# Patient Record
Sex: FEMALE | Race: WHITE | Employment: OTHER | URBAN - METROPOLITAN AREA
[De-identification: names, ages, dates, MRNs, and addresses within clinical notes are randomized per-mention and may not be internally consistent; named-entity substitution may affect disease eponyms.]

---

## 2017-04-13 ENCOUNTER — OFFICE VISIT (OUTPATIENT)
Dept: ORTHOPEDIC SURGERY | Age: 67
End: 2017-04-13

## 2017-04-13 VITALS
DIASTOLIC BLOOD PRESSURE: 69 MMHG | HEIGHT: 62 IN | SYSTOLIC BLOOD PRESSURE: 111 MMHG | HEART RATE: 74 BPM | WEIGHT: 287.04 LBS | BODY MASS INDEX: 52.82 KG/M2

## 2017-04-13 DIAGNOSIS — M65.331 TRIGGER FINGER, RIGHT MIDDLE FINGER: ICD-10-CM

## 2017-04-13 DIAGNOSIS — M70.22 OLECRANON BURSITIS, LEFT ELBOW: ICD-10-CM

## 2017-04-13 DIAGNOSIS — M25.522 LEFT ELBOW PAIN: Primary | ICD-10-CM

## 2017-04-13 DIAGNOSIS — M79.641 RIGHT HAND PAIN: ICD-10-CM

## 2017-04-13 DIAGNOSIS — M19.041 OSTEOARTHRITIS OF FINGER OF RIGHT HAND: ICD-10-CM

## 2017-04-13 PROCEDURE — 99203 OFFICE O/P NEW LOW 30 MIN: CPT | Performed by: ORTHOPAEDIC SURGERY

## 2017-04-13 PROCEDURE — 73140 X-RAY EXAM OF FINGER(S): CPT | Performed by: ORTHOPAEDIC SURGERY

## 2017-04-13 PROCEDURE — 73080 X-RAY EXAM OF ELBOW: CPT | Performed by: ORTHOPAEDIC SURGERY

## 2017-04-13 PROCEDURE — E0191 PROTECTOR HEEL OR ELBOW: HCPCS | Performed by: ORTHOPAEDIC SURGERY

## 2017-04-13 PROCEDURE — 20550 NJX 1 TENDON SHEATH/LIGAMENT: CPT | Performed by: ORTHOPAEDIC SURGERY

## 2017-06-07 ENCOUNTER — HOSPITAL ENCOUNTER (OUTPATIENT)
Dept: MAMMOGRAPHY | Age: 67
Discharge: HOME | End: 2017-06-07
Attending: FAMILY MEDICINE | Admitting: FAMILY MEDICINE

## 2017-06-07 DIAGNOSIS — Z12.31 ENCOUNTER FOR SCREENING MAMMOGRAM FOR BREAST CANCER: ICD-10-CM

## 2017-07-17 ENCOUNTER — OFFICE VISIT (OUTPATIENT)
Dept: CARDIOLOGY CLINIC | Age: 67
End: 2017-07-17

## 2017-07-17 VITALS
DIASTOLIC BLOOD PRESSURE: 54 MMHG | HEIGHT: 61 IN | HEART RATE: 77 BPM | BODY MASS INDEX: 50.79 KG/M2 | SYSTOLIC BLOOD PRESSURE: 126 MMHG | WEIGHT: 269 LBS

## 2017-07-17 DIAGNOSIS — R06.02 SHORTNESS OF BREATH: Chronic | ICD-10-CM

## 2017-07-17 DIAGNOSIS — R06.4 LABORED BREATHING: ICD-10-CM

## 2017-07-17 DIAGNOSIS — I48.0 PAROXYSMAL ATRIAL FIBRILLATION (HCC): Primary | ICD-10-CM

## 2017-07-17 DIAGNOSIS — R06.02 SOB (SHORTNESS OF BREATH): ICD-10-CM

## 2017-07-17 PROCEDURE — 99214 OFFICE O/P EST MOD 30 MIN: CPT | Performed by: INTERNAL MEDICINE

## 2017-07-17 PROCEDURE — 93000 ELECTROCARDIOGRAM COMPLETE: CPT | Performed by: INTERNAL MEDICINE

## 2017-07-17 RX ORDER — ATORVASTATIN CALCIUM 40 MG/1
40 TABLET, FILM COATED ORAL NIGHTLY
COMMUNITY
Start: 2017-04-20

## 2017-07-26 ENCOUNTER — HOSPITAL ENCOUNTER (OUTPATIENT)
Dept: CARDIOLOGY | Facility: CLINIC | Age: 67
Discharge: HOME | End: 2017-07-26
Attending: INTERNAL MEDICINE | Admitting: INTERNAL MEDICINE

## 2017-07-26 LAB
LV EF: 55 %
LVEF MODALITY: NORMAL

## 2017-07-28 ENCOUNTER — TELEPHONE (OUTPATIENT)
Dept: CARDIOLOGY CLINIC | Age: 67
End: 2017-07-28

## 2017-09-21 ENCOUNTER — OFFICE VISIT (OUTPATIENT)
Dept: ORTHOPEDIC SURGERY | Age: 67
End: 2017-09-21

## 2017-09-21 ENCOUNTER — TELEPHONE (OUTPATIENT)
Dept: ORTHOPEDIC SURGERY | Age: 67
End: 2017-09-21

## 2017-09-21 VITALS
SYSTOLIC BLOOD PRESSURE: 107 MMHG | DIASTOLIC BLOOD PRESSURE: 63 MMHG | HEART RATE: 105 BPM | BODY MASS INDEX: 50.78 KG/M2 | WEIGHT: 268.96 LBS | HEIGHT: 61 IN

## 2017-09-21 DIAGNOSIS — M65.331 TRIGGER FINGER, RIGHT MIDDLE FINGER: Primary | ICD-10-CM

## 2017-09-21 PROCEDURE — 99213 OFFICE O/P EST LOW 20 MIN: CPT | Performed by: ORTHOPAEDIC SURGERY

## 2017-10-03 NOTE — H&P
HISTORY & PHYSICAL FOR LOCAL CASES    History of present illness:  Right long trigger finger    All allergies & meds reviewed  RELEVANT EXAMS:  Airway:  normal    Pulmonary: normal    Cardiovascular: normal, history of prior atrial fibrillation    Abdominal: normal    Specific to procedure: Right long trigger finger    I have reviewed with the patient and/or family the risks, benefits and alternatives to the procedure.   ASA Class:  2    The patient condition is acceptable for planned local anesthesia:

## 2017-10-04 ENCOUNTER — HOSPITAL ENCOUNTER (OUTPATIENT)
Dept: SURGERY | Age: 67
Discharge: HOME | End: 2017-10-04
Attending: ORTHOPAEDIC SURGERY | Admitting: ORTHOPAEDIC SURGERY

## 2017-10-04 VITALS
TEMPERATURE: 97.5 F | RESPIRATION RATE: 18 BRPM | WEIGHT: 268 LBS | BODY MASS INDEX: 49.32 KG/M2 | SYSTOLIC BLOOD PRESSURE: 103 MMHG | DIASTOLIC BLOOD PRESSURE: 54 MMHG | HEART RATE: 83 BPM | HEIGHT: 62 IN | OXYGEN SATURATION: 97 %

## 2017-10-04 RX ORDER — HYDROCODONE BITARTRATE AND ACETAMINOPHEN 5; 325 MG/1; MG/1
1 TABLET ORAL EVERY 6 HOURS PRN
Qty: 10 TABLET | Refills: 0 | Status: SHIPPED | OUTPATIENT
Start: 2017-10-04 | End: 2017-10-11

## 2017-10-04 ASSESSMENT — PAIN SCALES - GENERAL: PAINLEVEL_OUTOF10: 0

## 2017-10-04 ASSESSMENT — PAIN DESCRIPTION - DESCRIPTORS: DESCRIPTORS: ACHING;TINGLING

## 2017-10-04 ASSESSMENT — PAIN - FUNCTIONAL ASSESSMENT: PAIN_FUNCTIONAL_ASSESSMENT: 0-10

## 2017-10-04 NOTE — IP AVS SNAPSHOT
Where to Get Your Medications      You can get these medications from any pharmacy     Bring a paper prescription for each of these medications     HYDROcodone-acetaminophen 5-325 MG per tablet               Allergies as of 10/4/2017        Reactions    Morphine Nausea And Vomiting      Immunizations as of 10/4/2017     No immunizations on file. Last Vitals          Most Recent Value    Temp  98.1 °F (36.7 °C)    Pulse  98    Resp  18    BP  127/68         After Visit Summary    This summary was created for you. Thank you for entrusting your care to us. The following information includes details about your hospital/visit stay along with steps you should take to help with your recovery once you leave the hospital.  In this packet, you will find information about the topics listed below:    · Instructions about your medications including a list of your home medications  · A summary of your hospital visit  · Follow-up appointments once you have left the hospital  · Your care plan at home      You may receive a survey regarding the care you received during your stay. Your input is valuable to us. We encourage you to complete and return your survey in the envelope provided. We hope you will choose us in the future for your healthcare needs. Patient Information     Patient Name MARIAELENA Harris 1950      Care Provided at:     Name Address Phone       [place] [id number] [address] [facility] [phone]            Your Visit    Here you will find information about your visit, including the reason for your visit. Please take this sheet with you when you visit your doctor or other health care provider in the future. It will help determine the best possible medical care for you at that time. If you have any questions once you leave the hospital, please call the department phone number listed below.         Why you were here This section includes instructions you will need to follow once you leave the hospital.  Your care team will discuss these with you, so you and those caring for you know how to best care for your health needs at home. This section may also include educational information about certain health topics that may be of help to you. Discharge Instructions          Post op Instructions for Hand and Upper Extremity Surgery    * Keep dressing dry and clean. It is ok to Shower just keep Dressing dry. * Elevate operative arm. * Ice 20 minutes on 20 minutes off. * Follow-up appointment as scheduled by office staff. Check paperwork from office. If no appointment scheduled call the office. * If dressing is too tight, you may loosen Ace bandage and leave splint in place. * Sling, as needed  * If you have any questions, refer to the office number listed below. [phone] [address]    What is a Surgical Site Infection   (SSI)? A surgical site infection is an infection that occurs after surgery in the part of the body where the surgery took place. Most patients who have surgery do not develop an infection. However, infections develop in about 1 to 3 out of every 100 patients who have surgery. Some of the common symptoms of a surgical site infection are:    -Redness and pain around the area where you had surgery     -Drainage of cloudy fluid from your surgical wound     -Fever  Can SSIs be treated? Yes. Most surgical site infections can be treated with antibiotics. The antibiotic given to you depends on the bacteria (germs) causing the infection. Sometimes patients   with SSIs also need another surgery to treat the infection. What are some of the things that hospitals are doing to prevent SSIs? To prevent SSIs, doctors, nurses, and other healthcare providers:       Clean their hands and arms up to their elbows with an antiseptic agent just before the surgery. Our records indicate that you have an active [employer] account. You can view your After Visit Summary by going to [email or web address] and logging in with your [employer] username and password. If you don't have a [employer] username and password but a parent or guardian has access to your record, the parent or guardian should login with their own [employer] username and password and access your record to view the After Visit Summary. Additional Information  If you have questions, please contact the physician practice where you receive care. Remember, [employer] is NOT to be used for urgent needs. For medical emergencies, dial 911. For questions regarding your [employer] account call [phone]. If you have a clinical question, please call your doctor's office. View your information online  ? Review your current list of  medications, immunization, and allergies. ? Review your future test results online . ? Review your discharge instructions provided by your caregivers at discharge    Certain functionality such as prescription refills, scheduling appointments or sending messages to your provider are not activated if your provider does not use [employer] in his/her office    For questions regarding your [employer] account call [phone]. If you have a clinical question, please call your doctor's office. The information on all pages of the After Visit Summary has been reviewed with me, the patient and/or responsible adult, by my health care provider(s). I had the opportunity to ask questions regarding this information. I understand I should dispose of my armband safely at home to protect my health information. A complete copy of the After Visit Summary has been given to me, the patient and/or responsible adult.            Patient Signature/Responsible Adult:____________________    Clinician Signature:_____________________    Date:_____________________

## 2017-10-10 ENCOUNTER — TELEPHONE (OUTPATIENT)
Dept: CARDIOLOGY CLINIC | Age: 67
End: 2017-10-10

## 2017-10-10 NOTE — TELEPHONE ENCOUNTER
Pt states that she is having tingling in her arms and feet and face and lips. She feels strange and overall weak. She reports no CP or SOB. Please call to advise.

## 2017-10-10 NOTE — TELEPHONE ENCOUNTER
Pt informed and she said she will probably go to ER. She can move her tongue alright and she doesn't think it's a stroke. She does not have any drooping in her face.

## 2017-10-13 ENCOUNTER — OFFICE VISIT (OUTPATIENT)
Dept: ORTHOPEDIC SURGERY | Age: 67
End: 2017-10-13

## 2017-10-13 VITALS — HEIGHT: 62 IN | WEIGHT: 268.08 LBS | BODY MASS INDEX: 49.33 KG/M2

## 2017-10-13 DIAGNOSIS — M65.331 TRIGGER FINGER, RIGHT MIDDLE FINGER: Primary | ICD-10-CM

## 2017-10-13 PROCEDURE — 99024 POSTOP FOLLOW-UP VISIT: CPT | Performed by: ORTHOPAEDIC SURGERY

## 2018-06-08 ENCOUNTER — HOSPITAL ENCOUNTER (OUTPATIENT)
Dept: MAMMOGRAPHY | Age: 68
Discharge: HOME | End: 2018-06-08
Attending: FAMILY MEDICINE | Admitting: FAMILY MEDICINE

## 2018-06-08 DIAGNOSIS — Z12.31 SCREENING MAMMOGRAM, ENCOUNTER FOR: ICD-10-CM

## 2018-08-14 RX ORDER — FLECAINIDE ACETATE 50 MG/1
50 TABLET ORAL 2 TIMES DAILY
Qty: 60 TABLET | Refills: 1 | Status: SHIPPED | OUTPATIENT
Start: 2018-08-14 | End: 2018-10-22 | Stop reason: SDUPTHER

## 2018-10-02 ENCOUNTER — OFFICE VISIT (OUTPATIENT)
Dept: CARDIOLOGY CLINIC | Age: 68
End: 2018-10-02
Payer: MEDICARE

## 2018-10-02 VITALS
BODY MASS INDEX: 41.77 KG/M2 | SYSTOLIC BLOOD PRESSURE: 122 MMHG | HEART RATE: 94 BPM | HEIGHT: 62 IN | DIASTOLIC BLOOD PRESSURE: 58 MMHG | OXYGEN SATURATION: 98 % | WEIGHT: 227 LBS

## 2018-10-02 DIAGNOSIS — I10 ESSENTIAL HYPERTENSION: ICD-10-CM

## 2018-10-02 DIAGNOSIS — I48.0 PAROXYSMAL ATRIAL FIBRILLATION (HCC): Primary | ICD-10-CM

## 2018-10-02 PROCEDURE — 93000 ELECTROCARDIOGRAM COMPLETE: CPT | Performed by: NURSE PRACTITIONER

## 2018-10-02 PROCEDURE — 99213 OFFICE O/P EST LOW 20 MIN: CPT | Performed by: NURSE PRACTITIONER

## 2018-10-02 RX ORDER — DILTIAZEM HYDROCHLORIDE 120 MG/1
120 CAPSULE, COATED, EXTENDED RELEASE ORAL DAILY
Qty: 90 CAPSULE | Refills: 3 | Status: SHIPPED | OUTPATIENT
Start: 2018-10-02 | End: 2019-10-03

## 2018-10-02 RX ORDER — HYDROCHLOROTHIAZIDE 25 MG/1
25 TABLET ORAL DAILY
Qty: 90 TABLET | Refills: 3
Start: 2018-10-02

## 2018-10-02 NOTE — COMMUNICATION BODY
[address]   Electrophysiology Note              Date:  October 2, 2018  Patient name: Rk Ayala  YOB: 1950    Primary Care physician: Aman Caballero MD    HISTORY OF PRESENT ILLNESS: The patient is a 77 y.o.  female with a history of paroxysmal atrial fibrillation, HTN, DM, obesity, and FADI. She had been taking flecainide and has maintained sinus rhythm. She wore a 24 hour holter in 8/2013 that showed normal sinus with premature beats. In 6/2016, she stopped taking flecainide due to cost, but restarted due to palpitations. Echo in 7/2017 showed an EF of 55%. Today she is being seen for paroxysmal atrial fibrillation. EKG shows SR with a HR of 74. She complains of dizziness and fluctuating blood glucose since losing weight. Complains of some palpitations (occuring 1-2x/month, lasting a couple hours) but denies chest pain and shortness of breath. Past Medical History:   has a past medical history of Atrial fibrillation ([address] Diabetes mellitus ([facility] [address] Hyperlipidemia; and Hypertension. Past Surgical History:   has a past surgical history that includes Tubal ligation; eye surgery; Hysterectomy; joint replacement (Bilateral); Rotator cuff repair (Bilateral); other surgical history (11/12/2014); and Finger trigger release (Right, 10/04/2017). Home Medications:    Prior to Admission medications    Medication Sig Start Date End Date Taking?  Authorizing Provider   flecainide (TAMBOCOR) 50 MG tablet Take 1 tablet by mouth 2 times daily 8/14/18  Yes Ayden KIMO Hamilton CNP   atorvastatin (LIPITOR) 40 MG tablet  4/20/17  Yes Historical Provider, MD   Cholecalciferol (VITAMIN D3) 2000 UNITS CAPS Take by mouth daily   Yes Historical Provider, MD   warfarin (COUMADIN) 2 MG tablet Take 2 tablets by mouth daily 7/13/16  Yes AydenKIMO Butterfield CNP   metFORMIN (GLUCOPHAGE) 500 MG tablet Take 1,000 mg by mouth 2 times daily (with meals)    Yes Historical

## 2018-10-02 NOTE — LETTER
[address] [place] [address] Cardiology - [facility] [address] [address]  Phone: [phone]  Fax: [phone]    Foy Osler, APRN - CNP        October 2, 2018     [address]    Patient: Rachelle Haywood  MR Number: [id number]  YOB: 1950  Date of Visit: 10/2/2018    Dear Dr. Adam Smith:    I recently saw our mutual patient, listed above. Below are the relevant portions of my assessment and plan of care. [address]   Electrophysiology Note              Date:  October 2, 2018  Patient name: Rachelle Haywood  YOB: 1950    Primary Care physician: Adam Smith MD    HISTORY OF PRESENT ILLNESS: The patient is a 77 y.o.  female with a history of paroxysmal atrial fibrillation, HTN, DM, obesity, and FADI. She had been taking flecainide and has maintained sinus rhythm. She wore a 24 hour holter in 8/2013 that showed normal sinus with premature beats. In 6/2016, she stopped taking flecainide due to cost, but restarted due to palpitations. Echo in 7/2017 showed an EF of 55%. Today she is being seen for paroxysmal atrial fibrillation. EKG shows SR with a HR of 74. She complains of dizziness and fluctuating blood glucose since losing weight. Complains of some palpitations (occuring 1-2x/month, lasting a couple hours) but denies chest pain and shortness of breath. Past Medical History:   has a past medical history of Atrial fibrillation ([address] Diabetes mellitus ([address] Hyperlipidemia; and Hypertension. Past Surgical History:   has a past surgical history that includes Tubal ligation; eye surgery; Hysterectomy; joint replacement (Bilateral); Rotator cuff repair (Bilateral); other surgical history (11/12/2014); and Finger trigger release (Right, 10/04/2017). Home Medications:    Prior to Admission medications    Medication Sig Start Date End Date Taking?  Authorizing Provider · Resp auscultation: Normal breath sounds without dullness or wheezing  Cardiovascular:  · The apical impulse is not displaced  · Heart tones are crisp and normal. regular S1 and S2.  · Jugular venous pulsation Normal  · The carotid upstroke is normal in amplitude and contour without delay or bruit  · Peripheral pulses are symmetrical and full   Abdomen:  · No masses or tenderness  · Bowel sounds present  Extremities:  ·  No cyanosis or clubbing  ·  No lower extremity edema  ·  Skin: warm and dry  Neurological:  · Alert and oriented  · Moves all extremities well  · No abnormalities of mood, affect, memory, mentation, or behavior are noted    DATA:    ECG 10/2/2018:  SR HR 74    Echo 7/26/2017:  Technically difficult examination.   Normal left ventricle systolic function with an estimated ejection fraction of 55%.   No regional wall motion abnormalities are seen.   Normal left ventricular diastolic filling pressure.   MIld pulmonic regurgitation.   Systolic pulmonary artery pressure (SPAP) is normal and estimated at 26 mmHg   (RA pressure 3 mmHg). CARDIOLOGY LABS:   CBC: No results for input(s): WBC, HGB, HCT, PLT in the last 72 hours. BMP: No results for input(s): NA, K, CO2, BUN, CREATININE, LABGLOM, GLUCOSE in the last 72 hours. PT/INR: No results for input(s): PROTIME, INR in the last 72 hours. APTT:No results for input(s): APTT in the last 72 hours. FASTING LIPID PANEL:No results found for: HDL, LDLDIRECT, LDLCALC, TRIG  LIVER PROFILE:No results for input(s): AST, ALT, ALB in the last 72 hours. Assessment:   1. Paroxysmal atrial fibrillation         -on flecainide              -on Coumadin for [id number] score 4 (age, gender, HTN, DM)  2. HTN: controlled  3. FADI: on CPAP  4. DM  5. Obesity    Plan:   1. Continue Coumadin, flecainide, and losartan  2. PCP managing Coumadin  3. Decrease HCTZ to 25mg daily due to dizziness  4.  Start Cardizem 120mg daily for palpitations to use in combination with flecainide   5. Can switch to Rythmol 150mg po TID if more cost effective  6. Monitor BP at home and call if consistently out of goal range  7. Follow up in one year     Corey Vaz, [address]  [phone]       If you have questions, please do not hesitate to call me. I look forward to following Shan Swenson along with you.     Sincerely,        Corey Vaz, APRN - CNP

## 2018-10-19 ENCOUNTER — TELEPHONE (OUTPATIENT)
Dept: CARDIOLOGY CLINIC | Age: 68
End: 2018-10-19

## 2018-10-23 RX ORDER — FLECAINIDE ACETATE 50 MG/1
50 TABLET ORAL 2 TIMES DAILY
Qty: 180 TABLET | Refills: 1 | Status: SHIPPED | OUTPATIENT
Start: 2018-10-23 | End: 2019-04-16 | Stop reason: SDUPTHER

## 2019-04-16 RX ORDER — FLECAINIDE ACETATE 50 MG/1
50 TABLET ORAL 2 TIMES DAILY
Qty: 180 TABLET | Refills: 1 | Status: SHIPPED | OUTPATIENT
Start: 2019-04-16 | End: 2019-10-03 | Stop reason: SDUPTHER

## 2019-04-16 NOTE — TELEPHONE ENCOUNTER
NPBB 10/2/2018     Plan:   1. Continue Coumadin, flecainide, and losartan  2. PCP managing Coumadin  3. Decrease HCTZ to 25mg daily due to dizziness  4. Start Cardizem 120mg daily for palpitations to use in combination with flecainide   5. Can switch to Rythmol 150mg po TID if more cost effective  6. Monitor BP at home and call if consistently out of goal range  7.  Follow up in one year      EKG 10/2/2018  BMP in care everywhere 11/23/2018

## 2019-06-11 ENCOUNTER — HOSPITAL ENCOUNTER (OUTPATIENT)
Dept: MAMMOGRAPHY | Age: 69
Discharge: HOME | End: 2019-06-11
Payer: MEDICARE

## 2019-06-11 DIAGNOSIS — Z12.31 ENCOUNTER FOR SCREENING MAMMOGRAM FOR BREAST CANCER: ICD-10-CM

## 2019-06-11 PROCEDURE — 77063 BREAST TOMOSYNTHESIS BI: CPT

## 2019-10-03 ENCOUNTER — OFFICE VISIT (OUTPATIENT)
Dept: CARDIOLOGY CLINIC | Age: 69
End: 2019-10-03
Payer: MEDICARE

## 2019-10-03 VITALS
HEIGHT: 62 IN | DIASTOLIC BLOOD PRESSURE: 62 MMHG | BODY MASS INDEX: 42.33 KG/M2 | SYSTOLIC BLOOD PRESSURE: 118 MMHG | WEIGHT: 230 LBS | HEART RATE: 86 BPM

## 2019-10-03 DIAGNOSIS — I10 ESSENTIAL HYPERTENSION: ICD-10-CM

## 2019-10-03 DIAGNOSIS — I48.0 PAROXYSMAL ATRIAL FIBRILLATION (HCC): Primary | ICD-10-CM

## 2019-10-03 PROCEDURE — 93000 ELECTROCARDIOGRAM COMPLETE: CPT | Performed by: NURSE PRACTITIONER

## 2019-10-03 PROCEDURE — 99213 OFFICE O/P EST LOW 20 MIN: CPT | Performed by: NURSE PRACTITIONER

## 2019-10-03 RX ORDER — FLECAINIDE ACETATE 50 MG/1
50 TABLET ORAL 2 TIMES DAILY
Qty: 180 TABLET | Refills: 3 | Status: SHIPPED | OUTPATIENT
Start: 2019-10-03 | End: 2020-09-16

## 2019-11-22 ENCOUNTER — OFFICE VISIT (OUTPATIENT)
Dept: ORTHOPEDIC SURGERY | Age: 69
End: 2019-11-22
Payer: MEDICARE

## 2019-11-22 VITALS — HEIGHT: 62 IN | BODY MASS INDEX: 42.31 KG/M2 | WEIGHT: 229.94 LBS

## 2019-11-22 DIAGNOSIS — M25.562 ACUTE PAIN OF BOTH KNEES: Primary | ICD-10-CM

## 2019-11-22 DIAGNOSIS — M25.561 ACUTE PAIN OF BOTH KNEES: Primary | ICD-10-CM

## 2019-11-22 DIAGNOSIS — Z96.653 HISTORY OF BILATERAL KNEE REPLACEMENT: ICD-10-CM

## 2019-11-22 PROCEDURE — 99214 OFFICE O/P EST MOD 30 MIN: CPT | Performed by: ORTHOPAEDIC SURGERY

## 2020-02-10 ENCOUNTER — OFFICE VISIT (OUTPATIENT)
Dept: ORTHOPEDIC SURGERY | Age: 70
End: 2020-02-10
Payer: MEDICARE

## 2020-02-10 VITALS — HEIGHT: 62 IN | WEIGHT: 234 LBS | BODY MASS INDEX: 43.06 KG/M2

## 2020-02-10 PROCEDURE — 99213 OFFICE O/P EST LOW 20 MIN: CPT | Performed by: PHYSICIAN ASSISTANT

## 2020-02-10 PROCEDURE — L3908 WHO COCK-UP NONMOLDE PRE OTS: HCPCS | Performed by: PHYSICIAN ASSISTANT

## 2020-02-10 NOTE — PROGRESS NOTES
Subjective:      Rukhsana Moss is an 68 y.o. female who presents for evaluation of right wrist pain. Onset was this morning - woke up with pain. Thinks she may have slept on it wrong. . The pain is up to 9/10, worsens with movement, and is relieved by rest. There is no associated numbness, tingling in the hand. She does have weakness in the hand. Evaluation to date: none. Treatment to date: avoidance of offending activity. Patient's medications, allergies, past medical, surgical, social and family histories were reviewed and updated as appropriate. Review of Systems  Pertinent items are noted in HPI. Past Surgical History:   Procedure Laterality Date    EYE SURGERY      FINGER TRIGGER RELEASE Right 10/04/2017    long    HYSTERECTOMY      JOINT REPLACEMENT Bilateral     bilateral knee replacements    OTHER SURGICAL HISTORY  11/12/2014    phacoemulsification of cataract with intraocular lens implant right eye    ROTATOR CUFF REPAIR Bilateral     bilateral rotator cuff repair    TUBAL LIGATION        Past Medical History:   Diagnosis Date    Atrial fibrillation ([address]     [facility] [facility]     Hyperlipidemia     Hypertension       Objective: The patient is alert and oriented X3. Appears stated age. Answers all questions appropriately. Ht 5' 2\" (1.575 m)   Wt 234 lb (106.1 kg)   BMI 42.80 kg/m²   Right wrist:  Edema and tenderness over dorsal/medial wrist. Limited flexion and extension of wrist due to pain. Limited supination but this appears to be symmetric to contralateral side. Negative Finkelstein. Skin is intact. Patient is neurovascularly intact. Brisk capillary refill. Left wrist:  normal exam, no swelling, tenderness, instability; ligaments intact, full ROM both hands, wrists, and finger joints Skin is intact. Patient is neurovascularly intact. Brisk capillary refill.        Imaging:  X-ray 3 views of the right wrist taken and reviewed today: shows arthritic changes,

## 2020-06-22 ENCOUNTER — HOSPITAL ENCOUNTER (OUTPATIENT)
Dept: WOMENS IMAGING | Age: 70
Discharge: HOME | End: 2020-06-22
Payer: MEDICARE

## 2020-06-22 ENCOUNTER — OFFICE VISIT (OUTPATIENT)
Dept: ORTHOPEDIC SURGERY | Age: 70
End: 2020-06-22
Payer: MEDICARE

## 2020-06-22 VITALS — HEIGHT: 62 IN | BODY MASS INDEX: 41.77 KG/M2 | WEIGHT: 227 LBS

## 2020-06-22 PROCEDURE — 77067 SCR MAMMO BI INCL CAD: CPT

## 2020-06-22 PROCEDURE — 99213 OFFICE O/P EST LOW 20 MIN: CPT | Performed by: ORTHOPAEDIC SURGERY

## 2020-06-22 RX ORDER — LIDOCAINE HYDROCHLORIDE 10 MG/ML
20 INJECTION, SOLUTION INFILTRATION; PERINEURAL ONCE
Status: COMPLETED | OUTPATIENT
Start: 2020-06-22 | End: 2020-06-22

## 2020-06-22 RX ORDER — METHYLPREDNISOLONE ACETATE 40 MG/ML
80 INJECTION, SUSPENSION INTRA-ARTICULAR; INTRALESIONAL; INTRAMUSCULAR; SOFT TISSUE ONCE
Status: COMPLETED | OUTPATIENT
Start: 2020-06-22 | End: 2020-06-22

## 2020-06-22 RX ORDER — MELOXICAM 15 MG/1
15 TABLET ORAL DAILY
Qty: 30 TABLET | Refills: 0 | Status: SHIPPED | OUTPATIENT
Start: 2020-06-22 | End: 2020-09-21

## 2020-06-22 RX ORDER — BUPIVACAINE HYDROCHLORIDE 2.5 MG/ML
30 INJECTION, SOLUTION INFILTRATION; PERINEURAL ONCE
Status: COMPLETED | OUTPATIENT
Start: 2020-06-22 | End: 2020-06-22

## 2020-06-22 RX ADMIN — METHYLPREDNISOLONE ACETATE 80 MG: 40 INJECTION, SUSPENSION INTRA-ARTICULAR; INTRALESIONAL; INTRAMUSCULAR; SOFT TISSUE at 14:28

## 2020-06-22 RX ADMIN — BUPIVACAINE HYDROCHLORIDE 75 MG: 2.5 INJECTION, SOLUTION INFILTRATION; PERINEURAL at 14:28

## 2020-06-22 RX ADMIN — LIDOCAINE HYDROCHLORIDE 20 ML: 10 INJECTION, SOLUTION INFILTRATION; PERINEURAL at 14:28

## 2020-07-06 ENCOUNTER — TELEPHONE (OUTPATIENT)
Dept: CARDIOLOGY CLINIC | Age: 70
End: 2020-07-06

## 2020-07-06 NOTE — TELEPHONE ENCOUNTER
Kaiden Blanc is pt okay to have cardiac clearance for hip replacement surgery on 9/24/20. Pt needs to know how long to hold Warfarin for. Pt has f/u with NPBB 10/7/20. Next EP appt 10/2020. EP is booked and front was told not to add on to EP currently. JMB please advise. Pt can be reached at [phone].         TY

## 2020-07-06 NOTE — TELEPHONE ENCOUNTER
Pt is having hip replacement on 9/24/2020 with Tyler Mclaughlin. Pt was a RPS pt. Appt on 10/07/2020 with NPBB . Pt needing cardiac clearance, and to find out if she can hold Warfarin. Next EP appt 10/2020. Told not to add-on to EP. Please advise.

## 2020-07-06 NOTE — TELEPHONE ENCOUNTER
She has not been seen by JORY KERN for 3 years. We cant provide cardiac risk assessment in this setting. She should have an echo and OV.

## 2020-07-08 NOTE — TELEPHONE ENCOUNTER
Alyssa Vilchis, you last saw pt 10/3/2019. Can an appointment be made with you as well as an echo for clearance for hip surgery in September as there are no EP MD appointments available until October? Or does she need to see an MD for clearance?

## 2020-07-08 NOTE — TELEPHONE ENCOUNTER
Pt called, informed that MD couldn't give cardiac clearance, surgery is scheduled for sept 24th, but no MD has availability until the end of October. Can RN look at schedule to see if we can fit her in? Please advise pt.  Will also need orders for echo

## 2020-07-21 ENCOUNTER — HOSPITAL ENCOUNTER (OUTPATIENT)
Dept: CARDIOLOGY | Age: 70
Discharge: HOME | End: 2020-07-21
Payer: MEDICARE

## 2020-07-21 ENCOUNTER — TELEPHONE (OUTPATIENT)
Dept: ORTHOPEDIC SURGERY | Age: 70
End: 2020-07-21

## 2020-07-21 LAB
LV EF: 55 %
LVEF MODALITY: NORMAL

## 2020-07-21 PROCEDURE — 93306 TTE W/DOPPLER COMPLETE: CPT

## 2020-07-27 ENCOUNTER — OFFICE VISIT (OUTPATIENT)
Dept: CARDIOLOGY CLINIC | Age: 70
End: 2020-07-27
Payer: MEDICARE

## 2020-07-27 VITALS
WEIGHT: 223 LBS | HEART RATE: 66 BPM | BODY MASS INDEX: 40.78 KG/M2 | SYSTOLIC BLOOD PRESSURE: 118 MMHG | DIASTOLIC BLOOD PRESSURE: 60 MMHG | OXYGEN SATURATION: 96 %

## 2020-07-27 PROBLEM — Z01.810 PRE-OPERATIVE CARDIOVASCULAR EXAMINATION: Status: ACTIVE | Noted: 2020-07-27

## 2020-07-27 PROCEDURE — 99204 OFFICE O/P NEW MOD 45 MIN: CPT | Performed by: INTERNAL MEDICINE

## 2020-07-27 PROCEDURE — 93000 ELECTROCARDIOGRAM COMPLETE: CPT | Performed by: INTERNAL MEDICINE

## 2020-07-27 RX ORDER — MELOXICAM 15 MG/1
15 TABLET ORAL DAILY
Qty: 30 TABLET | Refills: 3 | OUTPATIENT
Start: 2020-07-27

## 2020-07-27 NOTE — PROGRESS NOTES
[facility]   CARDIAC EVALUATION NOTE  [phone]      PCP:  Clary Ortega DO    Reason for Consultation/Chief Complaint: cardiac clearance for left TKR, interventional cardiology consultation    Subjective   History of Present Illness:  Farheen Sargent is a 65 y.o. patient with a history of PAF, HTN, HLD and DM who presents for pre-operative cardiac risk assessment for left TKR. She sees Tracey Ortiz CNP for PAF. Her 24 hour holter in 8/2013 showed SR with premature beats. In 6/2016, she stopped taking flecainide due to cost, but restarted due to palpitations. Her Echo in 7/2017 showed an EF of 55%. At her visit in 10/2018 HCTZ was decreased due to dizziness. Her echo from 07/21/20 showed her EF was 55%. Mild AS, AR, MR and [place]. She reports she is able to walk around at the grocery store with limitations. She lost over 100 lbs over the last 6 years. She is able to swim for exercise. She reports she has occasional \"blips\" or palpitations that are fleeting. She is on warfarin for PAF. She denies CP, SOB, dizziness or syncope. Past Medical History:   has a past medical history of Atrial fibrillation ([address] Diabetes mellitus ([address] Hyperlipidemia, and Hypertension. Surgical History:   has a past surgical history that includes Tubal ligation; eye surgery; Hysterectomy; joint replacement (Bilateral); Rotator cuff repair (Bilateral); other surgical history (11/12/2014); and Finger trigger release (Right, 10/04/2017). Social History:   reports that she has never smoked. She has never used smokeless tobacco. She reports that she does not drink alcohol or use drugs. Family History:  family history includes Cancer in her father and mother. Home Medications:  Were reviewed and are listed in nursing record and/or below  Prior to Admission medications    Medication Sig Start Date End Date Taking?  Authorizing Provider   Probiotic Product (PROBIOTIC PO) Take 1 tablet by mouth daily   Yes Historical Provider, MD   flecainide (TAMBOCOR) 50 MG tablet Take 1 tablet by mouth 2 times daily 10/3/19  Yes KIMO Mar CNP   hydrochlorothiazide (HYDRODIURIL) 25 MG tablet Take 1 tablet by mouth daily 10/2/18  Yes KIMO Mar CNP   atorvastatin (LIPITOR) 40 MG tablet  4/20/17  Yes Historical Provider, MD   Cholecalciferol (VITAMIN D3) 2000 UNITS CAPS Take by mouth daily   Yes Historical Provider, MD   warfarin (COUMADIN) 2 MG tablet Take 2 tablets by mouth daily 7/13/16  Yes KIMO Mar CNP   metFORMIN (GLUCOPHAGE) 500 MG tablet Take 1,000 mg by mouth 2 times daily (with meals)    Yes Historical Provider, MD   glipiZIDE (GLUCOTROL) 10 MG tablet Take 10 mg by mouth 2 times daily (before meals). Yes Historical Provider, MD   losartan (COZAAR) 100 MG tablet Take 100 mg by mouth daily. Yes Historical Provider, MD   meloxicam (MOBIC) 15 MG tablet Take 1 tablet by mouth daily  Patient not taking: Reported on 7/27/2020 6/22/20   Augusto Yarbrough MD          Allergies:  Furosemide; Hydrocodone-acetaminophen; Sitagliptin; and Morphine     Review of Systems:   A 14 point review of symptoms completed. Pertinent positives identified in the HPI, all other review of symptoms negative as below.       Objective   PHYSICAL EXAM:    Vitals:    07/27/20 1217   BP: 118/60   Pulse: 66   SpO2: 96%    Weight: 223 lb (101.2 kg)         General Appearance:  Alert, cooperative, no distress, appears stated age   Head:  Normocephalic, without obvious abnormality, atraumatic   Eyes:  PERRL, conjunctiva/corneas clear   Nose: Nares normal, no drainage or sinus tenderness   Throat: Lips, mucosa, and tongue normal   Neck: Supple, symmetrical, trachea midline, no adenopathy, thyroid: not enlarged, symmetric, no tenderness/mass/nodules, no carotid bruit or JVD   Lungs:   Clear to auscultation bilaterally, respirations unlabored   Chest Wall:  No deformity or tenderness   Heart:  Regular rate and rhythm, S1, S2 normal, no murmur, rub or gallop   Abdomen:   Soft, non-tender, bowel sounds active all four quadrants,  no masses, no organomegaly   Extremities: Extremities normal, atraumatic, no cyanosis or edema   Pulses: 2+ and symmetric   Skin: Skin color, texture, turgor normal, no rashes or lesions   Pysch: Normal mood and affect   Neurologic: Normal gross motor and sensory exam.         Labs   CBC:   Lab Results   Component Value Date    WBC 7.9 11/12/2014    RBC 5.56 11/12/2014    HGB 15.4 11/12/2014    HCT 45.7 11/12/2014    MCV 82.2 11/12/2014    RDW 13.4 11/12/2014     11/12/2014     CMP:  Lab Results   Component Value Date     05/20/2016    K 3.8 05/20/2016    CL 97 05/20/2016    CO2 26 05/20/2016    BUN 18 05/20/2016    CREATININE 0.7 05/20/2016    GFRAA >60 05/20/2016    LABGLOM >60 05/20/2016    GLUCOSE 149 05/20/2016    CALCIUM 9.6 05/20/2016     PT/INR:  No results found for: PTINR  HgBA1c:No results found for: LABA1C  No results found for: CKTOTAL, CKMB, CKMBINDEX, TROPONINI      Cardiac Data     Last EKG:   Possible ectopic atrial rhythm w/ ivcd, similar to prev     Echo: 07/21/20  Summary   Normal left ventricle systolic function with an estimated ejection fraction   of 55%. No regional wall motion abnormalities are seen. Normal left ventricular diastolic filling pressure. The aortic valve appears mildly thickened/calcified with decreased leaflet   mobility. Mild aortic stenosis. Mild aortic regurgitation. Mild mitral and pulmonic regurgitation. Systolic pulmonary artery pressure (SPAP) is normal and estimated at 28 mmHg   (right atrial pressure 3 mmHg). Stress Test:    Cath:    Studies:       I have reviewed labs and imaging/xray/diagnostic testing in this note. Assessment      1. Pre-operative cardiovascular examination    2. Essential hypertension    3.  Paroxysmal atrial fibrillation (HCC)                 Plan   1. EP follow up for Paroxysmal atrial fibrillation, consider ablation  2. [place] Leriche for surgery at intermediate cardiac risk  3. Follow up as needed       Scribe's attestation: This note was scribed in the presence of Dr. Kriss Staley by Lul Dawkins RN      Thank you for allowing us to participate in the care of Merlin Legacy. Please call me with any questions [phone]. Kriss Staley MD, [facility] [place]   Interventional Cardiologist  [facility]  [phone] [facility]  [phone] [address] [place] [address]  7/27/2020 1:02 PM    I will address the patient's cardiac risk factors and adjusted pharmacologic treatment as needed. In addition, I have reinforced the need for patient directed risk factor modification. Tobacco use was discussed with the patient and educated on the negative effects and was asked not to use. All questions and concerns were addressed to the patient/family. Alternatives to my treatment were discussed. I, Dr Kriss Staley, personally performed the services described in this documentation, as scribed by the above signed scribe in my presence. It is both accurate and complete to my knowledge. I agree with the details independently gathered by the clinical support staff and the scribed note accurately describes my personal service to the patient.

## 2020-07-27 NOTE — PATIENT INSTRUCTIONS
1. EP follow up for Paroxysmal atrial fibrillation   2. [facility] for surgery at intermediate cardiac risk  3.  Follow up as needed

## 2020-07-27 NOTE — LETTER
[address] Cardiology - [address]  Phone: [phone]  Fax: [phone]    Elroy Vazquez MD        July 27, 2020    [place] [id number]      To Whom It May Concern,     Leatha Price 1950 is at intermediate cardiac risk for non cardiac surgery. If you have any questions or concerns, please don't hesitate to call.     Sincerely,         Elroy Vazquez MD

## 2020-08-26 PROBLEM — Z01.810 PRE-OPERATIVE CARDIOVASCULAR EXAMINATION: Status: RESOLVED | Noted: 2020-07-27 | Resolved: 2020-08-26

## 2020-09-04 ENCOUNTER — TELEPHONE (OUTPATIENT)
Dept: ORTHOPEDIC SURGERY | Age: 70
End: 2020-09-04

## 2020-09-04 NOTE — TELEPHONE ENCOUNTER
AuthConstkaila Lilian [id number]    Date: 09/24/20  Type of SX:  INPATIENT  Location: [facility]  CPT: 51825   DX Code: M16.12  SX area: L HIP  Insurance: [facility]

## 2020-09-10 ENCOUNTER — HOSPITAL ENCOUNTER (OUTPATIENT)
Dept: PREADMISSION TESTING | Age: 70
Discharge: HOME | End: 2020-09-14
Payer: MEDICARE

## 2020-09-10 LAB
ABO/RH: NORMAL
ALBUMIN SERPL-MCNC: 4 G/DL (ref 3.4–5)
ANION GAP SERPL CALCULATED.3IONS-SCNC: 14 MMOL/L (ref 3–16)
ANTIBODY SCREEN: NORMAL
APTT: 41.3 SEC (ref 24.2–36.2)
BACTERIA: ABNORMAL /HPF
BASOPHILS ABSOLUTE: 0.1 K/UL (ref 0–0.2)
BASOPHILS RELATIVE PERCENT: 0.8 %
BILIRUBIN URINE: NEGATIVE
BLOOD, URINE: ABNORMAL
BUN BLDV-MCNC: 24 MG/DL (ref 7–20)
CALCIUM SERPL-MCNC: 9.7 MG/DL (ref 8.3–10.6)
CHLORIDE BLD-SCNC: 100 MMOL/L (ref 99–110)
CLARITY: CLEAR
CO2: 27 MMOL/L (ref 21–32)
COLOR: YELLOW
CREAT SERPL-MCNC: 0.7 MG/DL (ref 0.6–1.2)
EKG ATRIAL RATE: 69 BPM
EKG DIAGNOSIS: NORMAL
EKG P-R INTERVAL: 142 MS
EKG Q-T INTERVAL: 388 MS
EKG QRS DURATION: 110 MS
EKG QTC CALCULATION (BAZETT): 415 MS
EKG R AXIS: 38 DEGREES
EKG T AXIS: 37 DEGREES
EKG VENTRICULAR RATE: 69 BPM
EOSINOPHILS ABSOLUTE: 0.2 K/UL (ref 0–0.6)
EOSINOPHILS RELATIVE PERCENT: 2.1 %
EPITHELIAL CELLS, UA: ABNORMAL /HPF (ref 0–5)
ESTIMATED AVERAGE GLUCOSE: 131.2 MG/DL
GFR AFRICAN AMERICAN: >60
GFR NON-AFRICAN AMERICAN: >60
GLUCOSE BLD-MCNC: 141 MG/DL (ref 70–99)
GLUCOSE URINE: NEGATIVE MG/DL
HBA1C MFR BLD: 6.2 %
HCT VFR BLD CALC: 41.9 % (ref 36–48)
HEMOGLOBIN: 14.1 G/DL (ref 12–16)
INR BLD: 2.71 (ref 0.86–1.14)
KETONES, URINE: NEGATIVE MG/DL
LEUKOCYTE ESTERASE, URINE: ABNORMAL
LYMPHOCYTES ABSOLUTE: 1.9 K/UL (ref 1–5.1)
LYMPHOCYTES RELATIVE PERCENT: 25.7 %
MCH RBC QN AUTO: 28.1 PG (ref 26–34)
MCHC RBC AUTO-ENTMCNC: 33.6 G/DL (ref 31–36)
MCV RBC AUTO: 83.6 FL (ref 80–100)
MICROSCOPIC EXAMINATION: YES
MONOCYTES ABSOLUTE: 0.6 K/UL (ref 0–1.3)
MONOCYTES RELATIVE PERCENT: 7.8 %
NEUTROPHILS ABSOLUTE: 4.7 K/UL (ref 1.7–7.7)
NEUTROPHILS RELATIVE PERCENT: 63.6 %
NITRITE, URINE: NEGATIVE
PDW BLD-RTO: 13.5 % (ref 12.4–15.4)
PH UA: 5.5 (ref 5–8)
PLATELET # BLD: 240 K/UL (ref 135–450)
PMV BLD AUTO: 8.3 FL (ref 5–10.5)
POTASSIUM SERPL-SCNC: 4.2 MMOL/L (ref 3.5–5.1)
PROTEIN UA: NEGATIVE MG/DL
PROTHROMBIN TIME: 31.8 SEC (ref 10–13.2)
RBC # BLD: 5.02 M/UL (ref 4–5.2)
RBC UA: ABNORMAL /HPF (ref 0–4)
SODIUM BLD-SCNC: 141 MMOL/L (ref 136–145)
SPECIFIC GRAVITY UA: 1.02 (ref 1–1.03)
TRANSFERRIN: 232 MG/DL (ref 200–360)
URINE TYPE: ABNORMAL
UROBILINOGEN, URINE: 0.2 E.U./DL
WBC # BLD: 7.4 K/UL (ref 4–11)
WBC UA: ABNORMAL /HPF (ref 0–5)

## 2020-09-10 PROCEDURE — 82040 ASSAY OF SERUM ALBUMIN: CPT

## 2020-09-10 PROCEDURE — 84466 ASSAY OF TRANSFERRIN: CPT

## 2020-09-10 PROCEDURE — 85025 COMPLETE CBC W/AUTO DIFF WBC: CPT

## 2020-09-10 PROCEDURE — 83036 HEMOGLOBIN GLYCOSYLATED A1C: CPT

## 2020-09-10 PROCEDURE — 80048 BASIC METABOLIC PNL TOTAL CA: CPT

## 2020-09-10 PROCEDURE — 86900 BLOOD TYPING SEROLOGIC ABO: CPT

## 2020-09-10 PROCEDURE — 93010 ELECTROCARDIOGRAM REPORT: CPT | Performed by: INTERNAL MEDICINE

## 2020-09-10 PROCEDURE — 81001 URINALYSIS AUTO W/SCOPE: CPT

## 2020-09-10 PROCEDURE — 87077 CULTURE AEROBIC IDENTIFY: CPT

## 2020-09-10 PROCEDURE — 85730 THROMBOPLASTIN TIME PARTIAL: CPT

## 2020-09-10 PROCEDURE — 85610 PROTHROMBIN TIME: CPT

## 2020-09-10 PROCEDURE — 86901 BLOOD TYPING SEROLOGIC RH(D): CPT

## 2020-09-10 PROCEDURE — 87086 URINE CULTURE/COLONY COUNT: CPT

## 2020-09-10 PROCEDURE — 87186 SC STD MICRODIL/AGAR DIL: CPT

## 2020-09-10 PROCEDURE — 36415 COLL VENOUS BLD VENIPUNCTURE: CPT

## 2020-09-10 PROCEDURE — 86850 RBC ANTIBODY SCREEN: CPT

## 2020-09-10 PROCEDURE — 93005 ELECTROCARDIOGRAM TRACING: CPT | Performed by: ORTHOPAEDIC SURGERY

## 2020-09-12 LAB
ORGANISM: ABNORMAL
URINE CULTURE, ROUTINE: ABNORMAL

## 2020-09-16 ENCOUNTER — TELEPHONE (OUTPATIENT)
Dept: ORTHOPEDIC SURGERY | Age: 70
End: 2020-09-16

## 2020-09-16 RX ORDER — FLECAINIDE ACETATE 50 MG/1
TABLET ORAL
Qty: 180 TABLET | Refills: 0 | Status: SHIPPED | OUTPATIENT
Start: 2020-09-16 | End: 2020-11-10 | Stop reason: ALTCHOICE

## 2020-09-16 NOTE — TELEPHONE ENCOUNTER
COVID: 9/18/2020- negative    Pre-treat pt for post op N/V     Orthopedic Nurse Navigator Summary  -  Patient Name: Heather Godoy  Anticipated Date of Surgery:9/24/2020  Using OrthoVitals? Yes, Are they Registered: Yes  Attended Pre-Op Education Class: No  If No, why not? PCP:  Phone #:  Date of PCP Visit for H&P: 09/15/2020  Any Noted Concerns from PCP prior to surgery: No  If Yes, what concerns?:  Is the Patient in a Pain Management Program?: No  Review of Past Medical History Reveals History of:  -  Critical Lab Values:  - Hemoglobin (g/dL): Date Value 14.1  - Hematocrit (%): Date Value  - HgbA1C : Date Value 6.2  - Albumin : Date Value 4.0  - BUN (mg/dL) : Date Value 24.0  - CREATININE (mg/dL) : Date Value 0.7  - BMI (kg/m2) : Date Value  -  Coronary Artery Disease/HTN/CHF History: Yes  Cardiologist: HTN, A-fib  Cardiac Clearance Necessary: Yes  Date of Cardiac Clearance Appt: 07/27/2020  On Plavix? No  If YES, when will they stop taking? Final Cardiac Recommendations: Cleared for surgery- to hold coumadin 5 days prior. On any anticoagulation: Yes  -  Diabetes History: Yes  Most Recent HgbA1C: 6.2  PCP or Endocrine Recommendations:  Nutritionist/Dietician Consult Scheduled:  Final Plan For Diabetic Control:  Pulmonary: COPD/Emphysema/ Use of home oxygen: FADI  Alcohol use: Non-Drinker  -  DVT Risk Stratification: Low Risk  Vascular Consult Ordered:  Date of Vascular Appt:  Hematology/Oncology Consult Ordered:  Date of Hematology/Oncology Appt:  Final Recommendation For DVT Prophylaxis:  Smoking history: Non-Smoker  Use of Estrogen:  -  BMI Greater than 40 at time of scheduling?: No  Has Surgeon been notified of BMI concern? No  Weight Loss Clinic Consult Ordered No  Date of Wt Loss Clinic Appt:  BMI at time of surgery (if went through [facility]):  -  Additional Medical Concerns: FADI- doesnt use cpap machine. Cant tolerate.   Additional Recommendations for above concerns:  Attended Pre-Hab Program: No  Anticipated Discharge Disposition: D/C home  Who will be with patient at home following discharge? , daughter lives close by.   Equipment patient already has: Mimi Ho on first or second floor: first  Bathroom on first or second floor: first  Weight bearing status: full  Pre-op ambulatory status:  Number of entry steps: ZERO  Caregiver assistance: full time  [facility]

## 2020-09-16 NOTE — TELEPHONE ENCOUNTER
10/03/2019 NPBB  Plan:   1. Continue flecainide, HCTZ, losartan, and Coumadin (PCP is managing Coumadin)   2. Patient stopped Cardizem by choice. Recommendation discussed. 3. Annual BMP and CBC (due 4/2020)  4.  Follow up in one year     11/10/2020 upcoming [address] [facility] appt          9/10/2020 bmp    9/10/2020 ekg

## 2020-09-17 ENCOUNTER — ANESTHESIA EVENT (OUTPATIENT)
Dept: OPERATING ROOM | Age: 70
DRG: 470 | End: 2020-09-17
Payer: MEDICARE

## 2020-09-18 ENCOUNTER — OFFICE VISIT (OUTPATIENT)
Dept: PRIMARY CARE CLINIC | Age: 70
End: 2020-09-18
Payer: MEDICARE

## 2020-09-18 PROCEDURE — 99211 OFF/OP EST MAY X REQ PHY/QHP: CPT | Performed by: NURSE PRACTITIONER

## 2020-09-19 LAB — SARS-COV-2, NAA: NOT DETECTED

## 2020-09-21 RX ORDER — CHOLECALCIFEROL (VITAMIN D3) 25 MCG
CAPSULE ORAL DAILY
COMMUNITY

## 2020-09-21 RX ORDER — GLIPIZIDE 5 MG/1
5 TABLET, FILM COATED, EXTENDED RELEASE ORAL DAILY
COMMUNITY

## 2020-09-21 NOTE — PROGRESS NOTES
Preoperative Screening for Elective Surgery/Invasive Procedures While COVID-19 present in the community     Have you had any of the following symptoms? o Fever, chills  o Cough  o Shortness of breath  o Muscle aches/pain  o Diarrhea  o Abdominal pain, nausea, vomiting  o Loss or decrease in taste and / or smell   Risk of Exposure  o Have you recently been hospitalized for COVID-19 or flu-like illness, if so when?  o Recently diagnosed with COVID-19, if so when?  o Recently tested for COVID-19, if so when?  o Have you been in close contact with a person or family member who currently has or recently had COVID-19? If yes, when and in what context?  o Do you live with anybody who in the last 14 days has had fever, chills, shortness of breath, muscle aches, flu-like illness?  o Do you have any close contacts or family members who are currently in the hospital for COVID-19 or flu-like illness? If yes, assess recent close contact with this person. Indicate if the patient has a positive screen by answering yes to one or more of the above questions. Patients who test positive or screen positive prior to surgery or on the day of surgery should be evaluated in conjunction with the surgeon/proceduralist/anesthesiologist to determine the urgency of the procedure. No to all above.

## 2020-09-23 RX ORDER — GABAPENTIN 300 MG/1
300 CAPSULE ORAL 3 TIMES DAILY
Status: CANCELLED | OUTPATIENT
Start: 2020-09-23

## 2020-09-23 RX ORDER — CELECOXIB 100 MG/1
100 CAPSULE ORAL 2 TIMES DAILY
Status: CANCELLED | OUTPATIENT
Start: 2020-09-23

## 2020-09-24 ENCOUNTER — HOSPITAL ENCOUNTER (INPATIENT)
Age: 70
LOS: 2 days | Discharge: HOME | DRG: 470 | End: 2020-09-26
Attending: ORTHOPAEDIC SURGERY | Admitting: ORTHOPAEDIC SURGERY
Payer: MEDICARE

## 2020-09-24 ENCOUNTER — ANESTHESIA (OUTPATIENT)
Dept: OPERATING ROOM | Age: 70
DRG: 470 | End: 2020-09-24
Payer: MEDICARE

## 2020-09-24 VITALS — DIASTOLIC BLOOD PRESSURE: 71 MMHG | SYSTOLIC BLOOD PRESSURE: 121 MMHG | RESPIRATION RATE: 1 BRPM

## 2020-09-24 PROBLEM — Z96.642 STATUS POST TOTAL HIP REPLACEMENT, LEFT: Status: ACTIVE | Noted: 2020-09-24

## 2020-09-24 LAB
ABO/RH: NORMAL
ANTIBODY SCREEN: NORMAL
APTT: 30.7 SEC (ref 24.2–36.2)
GLUCOSE BLD-MCNC: 127 MG/DL (ref 70–99)
GLUCOSE BLD-MCNC: 175 MG/DL (ref 70–99)
GLUCOSE BLD-MCNC: 204 MG/DL (ref 70–99)
INR BLD: 1.29 (ref 0.86–1.14)
PERFORMED ON: ABNORMAL
PROTHROMBIN TIME: 15 SEC (ref 10–13.2)

## 2020-09-24 PROCEDURE — 2720000010 HC SURG SUPPLY STERILE: Performed by: ORTHOPAEDIC SURGERY

## 2020-09-24 PROCEDURE — 6370000000 HC RX 637 (ALT 250 FOR IP): Performed by: PHYSICIAN ASSISTANT

## 2020-09-24 PROCEDURE — 64447 NJX AA&/STRD FEMORAL NRV IMG: CPT | Performed by: ANESTHESIOLOGY

## 2020-09-24 PROCEDURE — 88311 DECALCIFY TISSUE: CPT

## 2020-09-24 PROCEDURE — 3600000005 HC SURGERY LEVEL 5 BASE: Performed by: ORTHOPAEDIC SURGERY

## 2020-09-24 PROCEDURE — 2709999900 HC NON-CHARGEABLE SUPPLY: Performed by: ORTHOPAEDIC SURGERY

## 2020-09-24 PROCEDURE — 2500000003 HC RX 250 WO HCPCS: Performed by: ANESTHESIOLOGY

## 2020-09-24 PROCEDURE — 6360000002 HC RX W HCPCS

## 2020-09-24 PROCEDURE — 7100000001 HC PACU RECOVERY - ADDTL 15 MIN: Performed by: ORTHOPAEDIC SURGERY

## 2020-09-24 PROCEDURE — 2580000003 HC RX 258: Performed by: ORTHOPAEDIC SURGERY

## 2020-09-24 PROCEDURE — 86901 BLOOD TYPING SEROLOGIC RH(D): CPT

## 2020-09-24 PROCEDURE — 6360000002 HC RX W HCPCS: Performed by: ORTHOPAEDIC SURGERY

## 2020-09-24 PROCEDURE — C9290 INJ, BUPIVACAINE LIPOSOME: HCPCS | Performed by: ORTHOPAEDIC SURGERY

## 2020-09-24 PROCEDURE — C1713 ANCHOR/SCREW BN/BN,TIS/BN: HCPCS | Performed by: ORTHOPAEDIC SURGERY

## 2020-09-24 PROCEDURE — 2500000003 HC RX 250 WO HCPCS

## 2020-09-24 PROCEDURE — 2500000003 HC RX 250 WO HCPCS: Performed by: ORTHOPAEDIC SURGERY

## 2020-09-24 PROCEDURE — 3600000015 HC SURGERY LEVEL 5 ADDTL 15MIN: Performed by: ORTHOPAEDIC SURGERY

## 2020-09-24 PROCEDURE — 3700000000 HC ANESTHESIA ATTENDED CARE: Performed by: ORTHOPAEDIC SURGERY

## 2020-09-24 PROCEDURE — 3700000001 HC ADD 15 MINUTES (ANESTHESIA): Performed by: ORTHOPAEDIC SURGERY

## 2020-09-24 PROCEDURE — 85610 PROTHROMBIN TIME: CPT

## 2020-09-24 PROCEDURE — C1776 JOINT DEVICE (IMPLANTABLE): HCPCS | Performed by: ORTHOPAEDIC SURGERY

## 2020-09-24 PROCEDURE — 1200000000 HC SEMI PRIVATE

## 2020-09-24 PROCEDURE — 7100000000 HC PACU RECOVERY - FIRST 15 MIN: Performed by: ORTHOPAEDIC SURGERY

## 2020-09-24 PROCEDURE — 2580000003 HC RX 258: Performed by: ANESTHESIOLOGY

## 2020-09-24 PROCEDURE — 2580000003 HC RX 258

## 2020-09-24 PROCEDURE — 6370000000 HC RX 637 (ALT 250 FOR IP): Performed by: ANESTHESIOLOGY

## 2020-09-24 PROCEDURE — 86850 RBC ANTIBODY SCREEN: CPT

## 2020-09-24 PROCEDURE — 85730 THROMBOPLASTIN TIME PARTIAL: CPT

## 2020-09-24 PROCEDURE — 2500000003 HC RX 250 WO HCPCS: Performed by: NURSE ANESTHETIST, CERTIFIED REGISTERED

## 2020-09-24 PROCEDURE — 88304 TISSUE EXAM BY PATHOLOGIST: CPT

## 2020-09-24 PROCEDURE — 86900 BLOOD TYPING SEROLOGIC ABO: CPT

## 2020-09-24 PROCEDURE — 6360000002 HC RX W HCPCS: Performed by: ANESTHESIOLOGY

## 2020-09-24 DEVICE — R3 3 HOLE ACETABULAR SHELL 50MM
Type: IMPLANTABLE DEVICE | Site: HIP | Status: FUNCTIONAL
Brand: R3 ACETABULAR

## 2020-09-24 DEVICE — REFLECTION SPHERICAL HEAD SCREW 25MM
Type: IMPLANTABLE DEVICE | Site: HIP | Status: FUNCTIONAL
Brand: REFLECTION

## 2020-09-24 DEVICE — REFLECTION THREADED HOLE COVER
Type: IMPLANTABLE DEVICE | Site: HIP | Status: FUNCTIONAL
Brand: REFLECTION

## 2020-09-24 DEVICE — R3 SCREW HOLE COVER
Type: IMPLANTABLE DEVICE | Site: HIP | Status: FUNCTIONAL
Brand: R3

## 2020-09-24 DEVICE — REFLECTION SPHERICAL HEAD SCREW 20MM
Type: IMPLANTABLE DEVICE | Site: HIP | Status: FUNCTIONAL
Brand: REFLECTION

## 2020-09-24 DEVICE — R3 20 DEGREE XLPE ACETABULAR LINER                                    32MM INNER DIAMETER X OUTER DIAMETER 50MM
Type: IMPLANTABLE DEVICE | Site: HIP | Status: FUNCTIONAL
Brand: R3

## 2020-09-24 DEVICE — OXINIUM FEMORAL HEAD 12/14 TAPER                                    32MM +0
Type: IMPLANTABLE DEVICE | Site: HIP | Status: FUNCTIONAL
Brand: OXINIUM

## 2020-09-24 DEVICE — SYNERGY POROUS COATED FEMORAL SIZE 12
Type: IMPLANTABLE DEVICE | Site: HIP | Status: FUNCTIONAL
Brand: SYNERGY

## 2020-09-24 RX ORDER — APREPITANT 40 MG/1
CAPSULE ORAL
Status: COMPLETED
Start: 2020-09-24 | End: 2020-09-24

## 2020-09-24 RX ORDER — HYDRALAZINE HYDROCHLORIDE 20 MG/ML
5 INJECTION INTRAMUSCULAR; INTRAVENOUS EVERY 10 MIN PRN
Status: DISCONTINUED | OUTPATIENT
Start: 2020-09-24 | End: 2020-09-24 | Stop reason: HOSPADM

## 2020-09-24 RX ORDER — ACETAMINOPHEN 500 MG
1000 TABLET ORAL ONCE
Status: COMPLETED | OUTPATIENT
Start: 2020-09-24 | End: 2020-09-24

## 2020-09-24 RX ORDER — ONDANSETRON 2 MG/ML
4 INJECTION INTRAMUSCULAR; INTRAVENOUS
Status: COMPLETED | OUTPATIENT
Start: 2020-09-24 | End: 2020-09-24

## 2020-09-24 RX ORDER — WARFARIN SODIUM 2 MG/1
4 TABLET ORAL DAILY
Status: DISCONTINUED | OUTPATIENT
Start: 2020-09-24 | End: 2020-09-26 | Stop reason: HOSPADM

## 2020-09-24 RX ORDER — PROPOFOL 10 MG/ML
INJECTION, EMULSION INTRAVENOUS PRN
Status: DISCONTINUED | OUTPATIENT
Start: 2020-09-24 | End: 2020-09-24 | Stop reason: SDUPTHER

## 2020-09-24 RX ORDER — CELECOXIB 100 MG/1
200 CAPSULE ORAL ONCE
Status: COMPLETED | OUTPATIENT
Start: 2020-09-24 | End: 2020-09-24

## 2020-09-24 RX ORDER — ONDANSETRON 2 MG/ML
4 INJECTION INTRAMUSCULAR; INTRAVENOUS EVERY 6 HOURS PRN
Status: DISCONTINUED | OUTPATIENT
Start: 2020-09-24 | End: 2020-09-26 | Stop reason: HOSPADM

## 2020-09-24 RX ORDER — GABAPENTIN 300 MG/1
300 CAPSULE ORAL ONCE
Status: COMPLETED | OUTPATIENT
Start: 2020-09-24 | End: 2020-09-24

## 2020-09-24 RX ORDER — SODIUM CHLORIDE, SODIUM LACTATE, POTASSIUM CHLORIDE, CALCIUM CHLORIDE 600; 310; 30; 20 MG/100ML; MG/100ML; MG/100ML; MG/100ML
INJECTION, SOLUTION INTRAVENOUS CONTINUOUS
Status: DISCONTINUED | OUTPATIENT
Start: 2020-09-24 | End: 2020-09-24

## 2020-09-24 RX ORDER — GLIPIZIDE 5 MG/1
5 TABLET ORAL
Status: DISCONTINUED | OUTPATIENT
Start: 2020-09-25 | End: 2020-09-26 | Stop reason: HOSPADM

## 2020-09-24 RX ORDER — HYDROCHLOROTHIAZIDE 25 MG/1
25 TABLET ORAL DAILY
Status: DISCONTINUED | OUTPATIENT
Start: 2020-09-25 | End: 2020-09-26 | Stop reason: HOSPADM

## 2020-09-24 RX ORDER — FENTANYL CITRATE 50 UG/ML
25 INJECTION, SOLUTION INTRAMUSCULAR; INTRAVENOUS EVERY 5 MIN PRN
Status: DISCONTINUED | OUTPATIENT
Start: 2020-09-24 | End: 2020-09-24 | Stop reason: HOSPADM

## 2020-09-24 RX ORDER — DEXTROSE MONOHYDRATE 50 MG/ML
100 INJECTION, SOLUTION INTRAVENOUS PRN
Status: DISCONTINUED | OUTPATIENT
Start: 2020-09-24 | End: 2020-09-26 | Stop reason: HOSPADM

## 2020-09-24 RX ORDER — INSULIN GLARGINE 100 [IU]/ML
0.25 INJECTION, SOLUTION SUBCUTANEOUS NIGHTLY
Status: DISCONTINUED | OUTPATIENT
Start: 2020-09-24 | End: 2020-09-26 | Stop reason: HOSPADM

## 2020-09-24 RX ORDER — SODIUM CHLORIDE 9 MG/ML
INJECTION, SOLUTION INTRAVENOUS CONTINUOUS
Status: DISCONTINUED | OUTPATIENT
Start: 2020-09-24 | End: 2020-09-26 | Stop reason: HOSPADM

## 2020-09-24 RX ORDER — ROCURONIUM BROMIDE 10 MG/ML
INJECTION, SOLUTION INTRAVENOUS PRN
Status: DISCONTINUED | OUTPATIENT
Start: 2020-09-24 | End: 2020-09-24 | Stop reason: SDUPTHER

## 2020-09-24 RX ORDER — SODIUM CHLORIDE 0.9 % (FLUSH) 0.9 %
10 SYRINGE (ML) INJECTION EVERY 12 HOURS SCHEDULED
Status: DISCONTINUED | OUTPATIENT
Start: 2020-09-24 | End: 2020-09-24 | Stop reason: HOSPADM

## 2020-09-24 RX ORDER — MORPHINE SULFATE 2 MG/ML
2 INJECTION, SOLUTION INTRAMUSCULAR; INTRAVENOUS
Status: DISCONTINUED | OUTPATIENT
Start: 2020-09-24 | End: 2020-09-26 | Stop reason: HOSPADM

## 2020-09-24 RX ORDER — OXYCODONE HYDROCHLORIDE 5 MG/1
10 TABLET ORAL EVERY 4 HOURS PRN
Status: DISCONTINUED | OUTPATIENT
Start: 2020-09-24 | End: 2020-09-26 | Stop reason: HOSPADM

## 2020-09-24 RX ORDER — DEXTROSE MONOHYDRATE 25 G/50ML
12.5 INJECTION, SOLUTION INTRAVENOUS PRN
Status: DISCONTINUED | OUTPATIENT
Start: 2020-09-24 | End: 2020-09-26 | Stop reason: HOSPADM

## 2020-09-24 RX ORDER — MEPERIDINE HYDROCHLORIDE 50 MG/ML
12.5 INJECTION INTRAMUSCULAR; INTRAVENOUS; SUBCUTANEOUS EVERY 5 MIN PRN
Status: DISCONTINUED | OUTPATIENT
Start: 2020-09-24 | End: 2020-09-24 | Stop reason: HOSPADM

## 2020-09-24 RX ORDER — APREPITANT 40 MG/1
40 CAPSULE ORAL ONCE
Status: COMPLETED | OUTPATIENT
Start: 2020-09-24 | End: 2020-09-24

## 2020-09-24 RX ORDER — SODIUM CHLORIDE 0.9 % (FLUSH) 0.9 %
10 SYRINGE (ML) INJECTION PRN
Status: DISCONTINUED | OUTPATIENT
Start: 2020-09-24 | End: 2020-09-24 | Stop reason: HOSPADM

## 2020-09-24 RX ORDER — MORPHINE SULFATE 4 MG/ML
4 INJECTION, SOLUTION INTRAMUSCULAR; INTRAVENOUS
Status: DISCONTINUED | OUTPATIENT
Start: 2020-09-24 | End: 2020-09-26 | Stop reason: HOSPADM

## 2020-09-24 RX ORDER — SENNA AND DOCUSATE SODIUM 50; 8.6 MG/1; MG/1
1 TABLET, FILM COATED ORAL 2 TIMES DAILY
Status: DISCONTINUED | OUTPATIENT
Start: 2020-09-24 | End: 2020-09-26 | Stop reason: HOSPADM

## 2020-09-24 RX ORDER — LOSARTAN POTASSIUM 100 MG/1
100 TABLET ORAL DAILY
Status: DISCONTINUED | OUTPATIENT
Start: 2020-09-25 | End: 2020-09-26 | Stop reason: HOSPADM

## 2020-09-24 RX ORDER — OXYCODONE HYDROCHLORIDE 5 MG/1
5 TABLET ORAL EVERY 4 HOURS PRN
Status: DISCONTINUED | OUTPATIENT
Start: 2020-09-24 | End: 2020-09-26 | Stop reason: HOSPADM

## 2020-09-24 RX ORDER — PROMETHAZINE HYDROCHLORIDE 25 MG/ML
6.25 INJECTION, SOLUTION INTRAMUSCULAR; INTRAVENOUS
Status: DISCONTINUED | OUTPATIENT
Start: 2020-09-24 | End: 2020-09-24 | Stop reason: HOSPADM

## 2020-09-24 RX ORDER — ONDANSETRON 2 MG/ML
INJECTION INTRAMUSCULAR; INTRAVENOUS PRN
Status: DISCONTINUED | OUTPATIENT
Start: 2020-09-24 | End: 2020-09-24 | Stop reason: SDUPTHER

## 2020-09-24 RX ORDER — HYDROMORPHONE HCL 110MG/55ML
PATIENT CONTROLLED ANALGESIA SYRINGE INTRAVENOUS PRN
Status: DISCONTINUED | OUTPATIENT
Start: 2020-09-24 | End: 2020-09-24 | Stop reason: SDUPTHER

## 2020-09-24 RX ORDER — SODIUM CHLORIDE 0.9 % (FLUSH) 0.9 %
10 SYRINGE (ML) INJECTION EVERY 12 HOURS SCHEDULED
Status: DISCONTINUED | OUTPATIENT
Start: 2020-09-24 | End: 2020-09-26 | Stop reason: HOSPADM

## 2020-09-24 RX ORDER — FENTANYL CITRATE 50 UG/ML
50 INJECTION, SOLUTION INTRAMUSCULAR; INTRAVENOUS EVERY 5 MIN PRN
Status: DISCONTINUED | OUTPATIENT
Start: 2020-09-24 | End: 2020-09-24 | Stop reason: HOSPADM

## 2020-09-24 RX ORDER — ACETAMINOPHEN 325 MG/1
650 TABLET ORAL EVERY 6 HOURS
Status: DISCONTINUED | OUTPATIENT
Start: 2020-09-24 | End: 2020-09-26 | Stop reason: HOSPADM

## 2020-09-24 RX ORDER — LABETALOL HYDROCHLORIDE 5 MG/ML
5 INJECTION, SOLUTION INTRAVENOUS EVERY 10 MIN PRN
Status: DISCONTINUED | OUTPATIENT
Start: 2020-09-24 | End: 2020-09-24 | Stop reason: HOSPADM

## 2020-09-24 RX ORDER — TRANEXAMIC ACID 100 MG/ML
INJECTION, SOLUTION INTRAVENOUS PRN
Status: DISCONTINUED | OUTPATIENT
Start: 2020-09-24 | End: 2020-09-24 | Stop reason: SDUPTHER

## 2020-09-24 RX ORDER — FENTANYL CITRATE 50 UG/ML
INJECTION, SOLUTION INTRAMUSCULAR; INTRAVENOUS PRN
Status: DISCONTINUED | OUTPATIENT
Start: 2020-09-24 | End: 2020-09-24 | Stop reason: SDUPTHER

## 2020-09-24 RX ORDER — OXYCODONE HYDROCHLORIDE 5 MG/1
10 TABLET ORAL PRN
Status: DISCONTINUED | OUTPATIENT
Start: 2020-09-24 | End: 2020-09-24 | Stop reason: HOSPADM

## 2020-09-24 RX ORDER — SODIUM CHLORIDE 0.9 % (FLUSH) 0.9 %
10 SYRINGE (ML) INJECTION PRN
Status: DISCONTINUED | OUTPATIENT
Start: 2020-09-24 | End: 2020-09-26 | Stop reason: HOSPADM

## 2020-09-24 RX ORDER — FLECAINIDE ACETATE 100 MG/1
50 TABLET ORAL 2 TIMES DAILY
Status: DISCONTINUED | OUTPATIENT
Start: 2020-09-24 | End: 2020-09-26 | Stop reason: HOSPADM

## 2020-09-24 RX ORDER — NICOTINE POLACRILEX 4 MG
15 LOZENGE BUCCAL PRN
Status: DISCONTINUED | OUTPATIENT
Start: 2020-09-24 | End: 2020-09-26 | Stop reason: HOSPADM

## 2020-09-24 RX ORDER — OXYCODONE HYDROCHLORIDE 5 MG/1
5 TABLET ORAL PRN
Status: DISCONTINUED | OUTPATIENT
Start: 2020-09-24 | End: 2020-09-24 | Stop reason: HOSPADM

## 2020-09-24 RX ORDER — DEXAMETHASONE SODIUM PHOSPHATE 4 MG/ML
INJECTION, SOLUTION INTRA-ARTICULAR; INTRALESIONAL; INTRAMUSCULAR; INTRAVENOUS; SOFT TISSUE PRN
Status: DISCONTINUED | OUTPATIENT
Start: 2020-09-24 | End: 2020-09-24 | Stop reason: SDUPTHER

## 2020-09-24 RX ADMIN — HYDROMORPHONE HYDROCHLORIDE 1 MG: 2 INJECTION INTRAMUSCULAR; INTRAVENOUS; SUBCUTANEOUS at 16:57

## 2020-09-24 RX ADMIN — SUGAMMADEX 200 MG: 100 INJECTION, SOLUTION INTRAVENOUS at 18:23

## 2020-09-24 RX ADMIN — HYDROMORPHONE HYDROCHLORIDE 1 MG: 2 INJECTION INTRAMUSCULAR; INTRAVENOUS; SUBCUTANEOUS at 16:51

## 2020-09-24 RX ADMIN — INSULIN GLARGINE 25 UNITS: 100 INJECTION, SOLUTION SUBCUTANEOUS at 22:55

## 2020-09-24 RX ADMIN — FENTANYL CITRATE 50 MCG: 50 INJECTION INTRAMUSCULAR; INTRAVENOUS at 16:14

## 2020-09-24 RX ADMIN — ONDANSETRON 4 MG: 2 INJECTION INTRAMUSCULAR; INTRAVENOUS at 19:01

## 2020-09-24 RX ADMIN — GABAPENTIN 300 MG: 300 CAPSULE ORAL at 14:52

## 2020-09-24 RX ADMIN — ACETAMINOPHEN 650 MG: 325 TABLET, FILM COATED ORAL at 22:53

## 2020-09-24 RX ADMIN — ROCURONIUM BROMIDE 50 MG: 10 SOLUTION INTRAVENOUS at 16:23

## 2020-09-24 RX ADMIN — SUGAMMADEX 200 MG: 100 INJECTION, SOLUTION INTRAVENOUS at 18:01

## 2020-09-24 RX ADMIN — CEFAZOLIN SODIUM 2 G: 10 INJECTION, POWDER, FOR SOLUTION INTRAVENOUS at 16:27

## 2020-09-24 RX ADMIN — INSULIN LISPRO 1 UNITS: 100 INJECTION, SOLUTION INTRAVENOUS; SUBCUTANEOUS at 22:54

## 2020-09-24 RX ADMIN — ROCURONIUM BROMIDE 10 MG: 10 SOLUTION INTRAVENOUS at 17:10

## 2020-09-24 RX ADMIN — DEXMEDETOMIDINE HYDROCHLORIDE 20 MCG: 100 INJECTION, SOLUTION INTRAVENOUS at 17:02

## 2020-09-24 RX ADMIN — FAMOTIDINE 20 MG: 10 INJECTION INTRAVENOUS at 14:52

## 2020-09-24 RX ADMIN — PROPOFOL 100 MG: 10 INJECTION, EMULSION INTRAVENOUS at 16:22

## 2020-09-24 RX ADMIN — SODIUM CHLORIDE, SODIUM LACTATE, POTASSIUM CHLORIDE, AND CALCIUM CHLORIDE: .6; .31; .03; .02 INJECTION, SOLUTION INTRAVENOUS at 16:14

## 2020-09-24 RX ADMIN — SODIUM CHLORIDE, SODIUM LACTATE, POTASSIUM CHLORIDE, AND CALCIUM CHLORIDE: .6; .31; .03; .02 INJECTION, SOLUTION INTRAVENOUS at 18:23

## 2020-09-24 RX ADMIN — APREPITANT 40 MG: 40 CAPSULE ORAL at 15:44

## 2020-09-24 RX ADMIN — CELECOXIB 200 MG: 100 CAPSULE ORAL at 14:52

## 2020-09-24 RX ADMIN — FENTANYL CITRATE 50 MCG: 50 INJECTION INTRAMUSCULAR; INTRAVENOUS at 16:19

## 2020-09-24 RX ADMIN — ONDANSETRON 1004 MG: 2 INJECTION INTRAMUSCULAR; INTRAVENOUS at 16:14

## 2020-09-24 RX ADMIN — DEXMEDETOMIDINE HYDROCHLORIDE 20 MCG: 100 INJECTION, SOLUTION INTRAVENOUS at 17:11

## 2020-09-24 RX ADMIN — FLECAINIDE ACETATE 50 MG: 100 TABLET ORAL at 22:54

## 2020-09-24 RX ADMIN — DEXAMETHASONE SODIUM PHOSPHATE 10 MG: 4 INJECTION, SOLUTION INTRAMUSCULAR; INTRAVENOUS at 16:29

## 2020-09-24 RX ADMIN — ACETAMINOPHEN 1000 MG: 500 TABLET ORAL at 14:52

## 2020-09-24 RX ADMIN — TRANEXAMIC ACID 1000 MG: 100 INJECTION, SOLUTION INTRAVENOUS at 16:29

## 2020-09-24 ASSESSMENT — PULMONARY FUNCTION TESTS
PIF_VALUE: 2
PIF_VALUE: 21
PIF_VALUE: 11
PIF_VALUE: 24
PIF_VALUE: 17
PIF_VALUE: 22
PIF_VALUE: 27
PIF_VALUE: 1
PIF_VALUE: 22
PIF_VALUE: 21
PIF_VALUE: 26
PIF_VALUE: 16
PIF_VALUE: 17
PIF_VALUE: 1
PIF_VALUE: 21
PIF_VALUE: 22
PIF_VALUE: 21
PIF_VALUE: 17
PIF_VALUE: 22
PIF_VALUE: 26
PIF_VALUE: 17
PIF_VALUE: 21
PIF_VALUE: 25
PIF_VALUE: 2
PIF_VALUE: 21
PIF_VALUE: 21
PIF_VALUE: 17
PIF_VALUE: 24
PIF_VALUE: 26
PIF_VALUE: 2
PIF_VALUE: 22
PIF_VALUE: 21
PIF_VALUE: 21
PIF_VALUE: 1
PIF_VALUE: 24
PIF_VALUE: 24
PIF_VALUE: 16
PIF_VALUE: 24
PIF_VALUE: 17
PIF_VALUE: 18
PIF_VALUE: 21
PIF_VALUE: 3
PIF_VALUE: 0
PIF_VALUE: 24
PIF_VALUE: 26
PIF_VALUE: 21
PIF_VALUE: 22
PIF_VALUE: 17
PIF_VALUE: 27
PIF_VALUE: 21
PIF_VALUE: 21
PIF_VALUE: 2
PIF_VALUE: 17
PIF_VALUE: 26
PIF_VALUE: 24
PIF_VALUE: 21
PIF_VALUE: 24
PIF_VALUE: 22
PIF_VALUE: 17
PIF_VALUE: 22
PIF_VALUE: 22
PIF_VALUE: 24
PIF_VALUE: 17
PIF_VALUE: 22
PIF_VALUE: 24
PIF_VALUE: 21
PIF_VALUE: 24
PIF_VALUE: 26
PIF_VALUE: 24
PIF_VALUE: 18
PIF_VALUE: 21
PIF_VALUE: 21
PIF_VALUE: 3
PIF_VALUE: 22
PIF_VALUE: 17
PIF_VALUE: 21
PIF_VALUE: 22
PIF_VALUE: 16
PIF_VALUE: 24
PIF_VALUE: 8
PIF_VALUE: 21
PIF_VALUE: 21
PIF_VALUE: 1
PIF_VALUE: 22
PIF_VALUE: 23
PIF_VALUE: 17
PIF_VALUE: 5
PIF_VALUE: 21
PIF_VALUE: 26
PIF_VALUE: 21
PIF_VALUE: 16
PIF_VALUE: 1
PIF_VALUE: 19
PIF_VALUE: 22
PIF_VALUE: 24
PIF_VALUE: 21
PIF_VALUE: 1
PIF_VALUE: 22
PIF_VALUE: 26
PIF_VALUE: 16
PIF_VALUE: 22
PIF_VALUE: 17
PIF_VALUE: 22
PIF_VALUE: 22
PIF_VALUE: 17
PIF_VALUE: 17
PIF_VALUE: 1
PIF_VALUE: 24
PIF_VALUE: 17

## 2020-09-24 ASSESSMENT — ENCOUNTER SYMPTOMS: SHORTNESS OF BREATH: 1

## 2020-09-24 ASSESSMENT — PAIN SCALES - GENERAL: PAINLEVEL_OUTOF10: 2

## 2020-09-24 NOTE — ANESTHESIA POSTPROCEDURE EVALUATION
Department of Anesthesiology  Postprocedure Note    Patient: Duran Calhoun  MRN: [id number]  YOB: 1950  Date of evaluation: 9/24/2020  Time:  7:05 PM     Procedure Summary     Date:  09/24/20 Room / Location:  [address] [place] [address]    Anesthesia Start:  1614 Anesthesia Stop:  1825    Procedure:  LEFT TOTAL HIP REPLACEMENT WITH Meghna Brielle & NEPHEW (Left Hip) Diagnosis:       Primary osteoarthritis of left hip      (OSTEOARTHRITIS LEFT HIP)    Surgeon:  Primitivo Moss MD Responsible Provider:  Stefany Maciel MD    Anesthesia Type:  general, regional ASA Status:  3          Anesthesia Type: general, regional    Charleen Phase I: Charleen Score: 7    Charleen Phase II:      Last vitals: Reviewed and per EMR flowsheets.    Vitals:    09/24/20 1830 09/24/20 1835 09/24/20 1840 09/24/20 1845   BP: (!) 113/59 123/62 131/60 (!) 132/56   Pulse: 83 65 67 68   Resp: 12 12 12 12   Temp:       TempSrc:       SpO2: 91% 90% 91% 96%   Weight:       Height:           Anesthesia Post Evaluation    Patient location during evaluation: bedside  Patient participation: complete - patient participated  Level of consciousness: awake and alert  Airway patency: patent  Nausea & Vomiting: no nausea  Complications: no  Cardiovascular status: hemodynamically stable  Respiratory status: acceptable  Hydration status: euvolemic

## 2020-09-24 NOTE — ANESTHESIA PRE PROCEDURE
Department of Anesthesiology  Preprocedure Note       Name:  Reggie Walter   Age:  65 y.o.  :  1950                                          MRN:  [id number]         Date:  2020      Surgeon: Darya Carter):  Vanna Kim MD    Procedure: Procedure(s):  LEFT TOTAL HIP REPLACEMENT WITH CELLSAVER      TORRES & NEPHEW    Medications prior to admission:   Prior to Admission medications    Medication Sig Start Date End Date Taking? Authorizing Provider   glipiZIDE (GLUCOTROL XL) 5 MG extended release tablet Take 5 mg by mouth daily   Yes Historical Provider, MD   Cholecalciferol (VITAMIN D-3) 25 MCG (1000 UT) CAPS Take by mouth daily   Yes Historical Provider, MD   flecainide (TAMBOCOR) 50 MG tablet TAKE ONE TABLET BY MOUTH TWICE A DAY 20  Yes KIMO Acuña CNP   Probiotic Product (PROBIOTIC PO) Take 1 tablet by mouth daily   Yes Historical Provider, MD   hydrochlorothiazide (HYDRODIURIL) 25 MG tablet Take 1 tablet by mouth daily 10/2/18  Yes KIMO Acuña CNP   atorvastatin (LIPITOR) 40 MG tablet Take 40 mg by mouth nightly  17  Yes Historical Provider, MD   metFORMIN (GLUCOPHAGE) 500 MG tablet Take 1,000 mg by mouth 2 times daily (with meals)    Yes Historical Provider, MD   losartan (COZAAR) 100 MG tablet Take 100 mg by mouth daily.    Yes Historical Provider, MD   warfarin (COUMADIN) 2 MG tablet Take 2 tablets by mouth daily 16   KIMO Acuña CNP       Current medications:    Current Facility-Administered Medications   Medication Dose Route Frequency Provider Last Rate Last Dose    lactated ringers infusion   Intravenous Continuous Laney Yo MD        sodium chloride flush 0.9 % injection 10 mL  10 mL Intravenous 2 times per day Laney Yo MD        sodium chloride flush 0.9 % injection 10 mL  10 mL Intravenous PRN Laney Yo MD        ceFAZolin (ANCEF) 2 g in dextrose 5 % 100 mL IVPB  2 g Intravenous On Call to OR Andrew Hess MD           Allergies:     Allergies   Allergen Reactions    Furosemide      Other reaction(s): Dizziness    Hydrocodone-Acetaminophen Nausea Only    Sitagliptin Diarrhea    Morphine Nausea And Vomiting       Problem List:    Patient Active Problem List   Diagnosis Code    Obesity, Class III, BMI 40-49.9 (morbid obesity) ([facility]) E66.01    Diabetes ([address] E11.9    Essential hypertension I10    Shortness of breath R06.02    Paroxysmal atrial fibrillation ([facility]) I48.0    Olecranon bursitis, left elbow M70.22    Trigger finger, right middle finger M65.331    Osteoarthritis of finger of right hand 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       Past Medical History:        Diagnosis Date    Atrial fibrillation ([address]     Diabetes mellitus ([address]     Hyperlipidemia     Hypertension     FADI (obstructive sleep apnea)     CAN'T TOLERATE DEVICE    PONV (postoperative nausea and vomiting)        Past Surgical History:        Procedure Laterality Date    COLONOSCOPY      EYE SURGERY Bilateral     cataracts    FINGER TRIGGER RELEASE Right 10/04/2017    long    HYSTERECTOMY      JOINT REPLACEMENT Bilateral     bilateral knee replacements    OTHER SURGICAL HISTORY  11/12/2014    phacoemulsification of cataract with intraocular lens implant right eye    ROTATOR CUFF REPAIR Bilateral     bilateral rotator cuff repair    TONSILLECTOMY      TUBAL LIGATION         Social History:    Social History     Tobacco Use    Smoking status: Never Smoker    Smokeless tobacco: Never Used   Substance Use Topics    Alcohol use: No     Alcohol/week: 0.0 standard drinks                                Counseling given: Not Answered      Vital Signs (Current):   Vitals:    09/21/20 1511   Weight: 225 lb (102.1 kg)   Height: 5' 1.75\" (1.568 m)                                              BP Readings from Last 3 Encounters:   07/27/20 118/60   10/03/19 118/62   10/02/18 (!) 122/58       NPO Status: Time of last liquid consumption: 0700 Time of last solid consumption: 2000                        Date of last liquid consumption: 09/24/20                        Date of last solid food consumption: 09/23/20    BMI:   Wt Readings from Last 3 Encounters:   09/21/20 225 lb (102.1 kg)   07/27/20 223 lb (101.2 kg)   06/22/20 227 lb (103 kg)     Body mass index is 41.49 kg/m². CBC:   Lab Results   Component Value Date    WBC 7.4 09/10/2020    RBC 5.02 09/10/2020    HGB 14.1 09/10/2020    HCT 41.9 09/10/2020    MCV 83.6 09/10/2020    RDW 13.5 09/10/2020     09/10/2020       CMP:   Lab Results   Component Value Date     09/10/2020    K 4.2 09/10/2020     09/10/2020    CO2 27 09/10/2020    BUN 24 09/10/2020    CREATININE 0.7 09/10/2020    GFRAA >60 09/10/2020    LABGLOM >60 09/10/2020    GLUCOSE 141 09/10/2020    CALCIUM 9.7 09/10/2020       POC Tests:   Recent Labs     09/24/20  1424   POCGLU 127*       Coags:   Lab Results   Component Value Date    PROTIME 15.0 09/24/2020    INR 1.29 09/24/2020    APTT 30.7 09/24/2020       HCG (If Applicable): No results found for: PREGTESTUR, PREGSERUM, HCG, HCGQUANT     ABGs: No results found for: PHART, PO2ART, DPR2IWV, NSF9XWH, BEART, F5SSSHLK     Type & Screen (If Applicable):  No results found for: LABABO, LABRH    Drug/Infectious Status (If Applicable):  No results found for: HIV, HEPCAB    COVID-19 Screening (If Applicable):   Lab Results   Component Value Date    COVID19 NOT DETECTED 09/18/2020         Anesthesia Evaluation  Patient summary reviewed and Nursing notes reviewed   history of anesthetic complications: PONV.   Airway: Mallampati: II  TM distance: >3 FB   Neck ROM: full  Mouth opening: > = 3 FB Dental: normal exam         Pulmonary:   (+) shortness of breath:  sleep apnea:                             Cardiovascular:    (+) hypertension:, dysrhythmias: atrial fibrillation,                   Neuro/Psych:   Negative Neuro/Psych ROS              GI/Hepatic/Renal: Neg GI/Hepatic/Renal ROS       (-) GERD, liver disease and no renal disease       Endo/Other:    (+) DiabetesType II DM, , .                 Abdominal:           Vascular: negative vascular ROS. Anesthesia Plan      general and regional     ASA 3     (I discussed with the patient the risks and benefits of PIV, general anesthesia, IV Narcotics, PACU. All questions were answered the patient agrees with the plan. Fascia iliaca block for post op pain.)  Induction: intravenous. MIPS: Prophylactic antiemetics administered. Anesthetic plan and risks discussed with patient. Plan discussed with CRNA.                   Garrison Walker MD   9/24/2020

## 2020-09-24 NOTE — BRIEF OP NOTE
Brief Postoperative Note      Patient: Lolis Simon  YOB: 1950  MRN: [id number]    Date of Procedure: 9/24/2020    Pre-Op Diagnosis: OSTEOARTHRITIS LEFT HIP    Post-Op Diagnosis: Same       Procedure(s):  LEFT TOTAL HIP REPLACEMENT WITH Clarissa Gunner & NEPHROSANA    Surgeon(s):  Debbie Cat MD    Assistant:  Surgical Assistant: Radha Zimmerman  Physician Assistant: NOBLE Villafana    Anesthesia: General    Estimated Blood Loss (mL): 355     Complications: None    Specimens:   ID Type Source Tests Collected by Time Destination   A : LEFT FEMORAL HEAD Tissue Tissue SURGICAL PATHOLOGY Debbie Cat MD 9/24/2020 1725        Implants:  Implant Name Type Inv.  Item Serial No.  Lot No. LRB No. Used Action   IMPL HIP REF THRD HOLE COVER Hip IMPL HIP REF THRD HOLE COVER  SMITH [id number] Left 1 Implanted   IMPL HIP ACET LINER R3 20 DEG 32 X 50 Hip IMPL HIP ACET LINER R3 20 DEG 32 X 50  SMITH [id number] Left 1 Implanted   SHELL ACET R3 3 HOLE 50 Hip SHELL ACET R3 3 HOLE 50  [place] [id number] Left 1 Implanted   SCREW HIP RAPID [facility] SOUTH HEAD 6.5X25MM Screw/Plate/Nail/Amarjit SCREW HIP [facility] SOUTH HEAD 6.5X25MM  SMITH [id number] Left 1 Implanted   IMPL HIP RE SCREW HL COVER Hip IMPL HIP RE SCREW HL COVER  SMITH [id number] Left 1 Implanted   SCREW HIP [facility] SOUTH HEAD 6.5X20MM Screw/Plate/Nail/Amarjit SCREW HIP [facility] SOUTH HEAD 6.5X20MM  SMITH [id number] Left 1 Implanted   IMPL HIP FEM STEM SYN PORS SZ 12 150MM Hip IMPL HIP FEM STEM SYN PORS SZ 12 150MM  [place] [id number] Left 1 Implanted   IMPL HIP FEM HEAD OXINIUM 12TO14 TAPR 0 Hip IMPL HIP FEM HEAD OXINIUM 12TO14 TAPR 0  [place] [id number] Left 1 Implanted         Drains: * No LDAs found *    Findings: left NILAY    Electronically signed by NOBLE Villafana on 9/24/2020 at 5:58 PM

## 2020-09-25 ENCOUNTER — APPOINTMENT (OUTPATIENT)
Dept: GENERAL RADIOLOGY | Age: 70
DRG: 470 | End: 2020-09-25
Attending: ORTHOPAEDIC SURGERY
Payer: MEDICARE

## 2020-09-25 LAB
ANION GAP SERPL CALCULATED.3IONS-SCNC: 12 MMOL/L (ref 3–16)
BUN BLDV-MCNC: 31 MG/DL (ref 7–20)
CALCIUM SERPL-MCNC: 9.3 MG/DL (ref 8.3–10.6)
CHLORIDE BLD-SCNC: 101 MMOL/L (ref 99–110)
CO2: 24 MMOL/L (ref 21–32)
CREAT SERPL-MCNC: 0.8 MG/DL (ref 0.6–1.2)
ESTIMATED AVERAGE GLUCOSE: 131.2 MG/DL
GFR AFRICAN AMERICAN: >60
GFR NON-AFRICAN AMERICAN: >60
GLUCOSE BLD-MCNC: 145 MG/DL (ref 70–99)
GLUCOSE BLD-MCNC: 186 MG/DL (ref 70–99)
GLUCOSE BLD-MCNC: 188 MG/DL (ref 70–99)
GLUCOSE BLD-MCNC: 227 MG/DL (ref 70–99)
HBA1C MFR BLD: 6.2 %
HCT VFR BLD CALC: 36.3 % (ref 36–48)
HEMOGLOBIN: 12.2 G/DL (ref 12–16)
INR BLD: 1.3 (ref 0.86–1.14)
MCH RBC QN AUTO: 28.4 PG (ref 26–34)
MCHC RBC AUTO-ENTMCNC: 33.6 G/DL (ref 31–36)
MCV RBC AUTO: 84.4 FL (ref 80–100)
PDW BLD-RTO: 13.1 % (ref 12.4–15.4)
PERFORMED ON: ABNORMAL
PLATELET # BLD: 174 K/UL (ref 135–450)
PMV BLD AUTO: 7.7 FL (ref 5–10.5)
POTASSIUM REFLEX MAGNESIUM: 4.6 MMOL/L (ref 3.5–5.1)
PROTHROMBIN TIME: 15.1 SEC (ref 10–13.2)
RBC # BLD: 4.3 M/UL (ref 4–5.2)
SODIUM BLD-SCNC: 137 MMOL/L (ref 136–145)
WBC # BLD: 10.8 K/UL (ref 4–11)

## 2020-09-25 PROCEDURE — 85610 PROTHROMBIN TIME: CPT

## 2020-09-25 PROCEDURE — 97162 PT EVAL MOD COMPLEX 30 MIN: CPT

## 2020-09-25 PROCEDURE — 0SRB06Z REPLACEMENT OF LEFT HIP JOINT WITH OXIDIZED ZIRCONIUM ON POLYETHYLENE SYNTHETIC SUBSTITUTE, OPEN APPROACH: ICD-10-PCS | Performed by: ORTHOPAEDIC SURGERY

## 2020-09-25 PROCEDURE — 94761 N-INVAS EAR/PLS OXIMETRY MLT: CPT

## 2020-09-25 PROCEDURE — 2700000000 HC OXYGEN THERAPY PER DAY

## 2020-09-25 PROCEDURE — 1200000000 HC SEMI PRIVATE

## 2020-09-25 PROCEDURE — 6360000002 HC RX W HCPCS: Performed by: PHYSICIAN ASSISTANT

## 2020-09-25 PROCEDURE — 83036 HEMOGLOBIN GLYCOSYLATED A1C: CPT

## 2020-09-25 PROCEDURE — 71045 X-RAY EXAM CHEST 1 VIEW: CPT

## 2020-09-25 PROCEDURE — 2580000003 HC RX 258: Performed by: PHYSICIAN ASSISTANT

## 2020-09-25 PROCEDURE — 97530 THERAPEUTIC ACTIVITIES: CPT

## 2020-09-25 PROCEDURE — 36415 COLL VENOUS BLD VENIPUNCTURE: CPT

## 2020-09-25 PROCEDURE — 97110 THERAPEUTIC EXERCISES: CPT

## 2020-09-25 PROCEDURE — 80048 BASIC METABOLIC PNL TOTAL CA: CPT

## 2020-09-25 PROCEDURE — 6370000000 HC RX 637 (ALT 250 FOR IP): Performed by: PHYSICIAN ASSISTANT

## 2020-09-25 PROCEDURE — 51701 INSERT BLADDER CATHETER: CPT

## 2020-09-25 PROCEDURE — 51798 US URINE CAPACITY MEASURE: CPT

## 2020-09-25 PROCEDURE — 85027 COMPLETE CBC AUTOMATED: CPT

## 2020-09-25 PROCEDURE — 97166 OT EVAL MOD COMPLEX 45 MIN: CPT

## 2020-09-25 RX ORDER — OXYCODONE HYDROCHLORIDE AND ACETAMINOPHEN 5; 325 MG/1; MG/1
1-2 TABLET ORAL EVERY 4 HOURS PRN
Qty: 40 TABLET | Refills: 0 | Status: SHIPPED | OUTPATIENT
Start: 2020-09-25 | End: 2020-09-28

## 2020-09-25 RX ADMIN — METFORMIN HYDROCHLORIDE 1000 MG: 500 TABLET ORAL at 17:01

## 2020-09-25 RX ADMIN — FLECAINIDE ACETATE 50 MG: 100 TABLET ORAL at 21:03

## 2020-09-25 RX ADMIN — SODIUM CHLORIDE, PRESERVATIVE FREE 10 ML: 5 INJECTION INTRAVENOUS at 21:05

## 2020-09-25 RX ADMIN — DOCUSATE SODIUM 50MG AND SENNOSIDES 8.6MG 1 TABLET: 8.6; 5 TABLET, FILM COATED ORAL at 10:21

## 2020-09-25 RX ADMIN — OXYCODONE 10 MG: 5 TABLET ORAL at 21:05

## 2020-09-25 RX ADMIN — ENOXAPARIN SODIUM 40 MG: 40 INJECTION SUBCUTANEOUS at 10:21

## 2020-09-25 RX ADMIN — OXYCODONE 5 MG: 5 TABLET ORAL at 17:01

## 2020-09-25 RX ADMIN — HYDROCHLOROTHIAZIDE 25 MG: 25 TABLET ORAL at 10:21

## 2020-09-25 RX ADMIN — GLIPIZIDE 5 MG: 5 TABLET ORAL at 10:27

## 2020-09-25 RX ADMIN — CEFAZOLIN SODIUM 2 G: 10 INJECTION, POWDER, FOR SOLUTION INTRAVENOUS at 00:51

## 2020-09-25 RX ADMIN — LOSARTAN POTASSIUM 100 MG: 100 TABLET, FILM COATED ORAL at 10:21

## 2020-09-25 RX ADMIN — WARFARIN SODIUM 4 MG: 2 TABLET ORAL at 17:01

## 2020-09-25 RX ADMIN — METFORMIN HYDROCHLORIDE 1000 MG: 500 TABLET ORAL at 10:27

## 2020-09-25 RX ADMIN — INSULIN LISPRO 2 UNITS: 100 INJECTION, SOLUTION INTRAVENOUS; SUBCUTANEOUS at 11:48

## 2020-09-25 RX ADMIN — INSULIN LISPRO 1 UNITS: 100 INJECTION, SOLUTION INTRAVENOUS; SUBCUTANEOUS at 21:04

## 2020-09-25 RX ADMIN — SODIUM CHLORIDE, PRESERVATIVE FREE 10 ML: 5 INJECTION INTRAVENOUS at 10:22

## 2020-09-25 RX ADMIN — ACETAMINOPHEN 650 MG: 325 TABLET, FILM COATED ORAL at 21:03

## 2020-09-25 RX ADMIN — FLECAINIDE ACETATE 50 MG: 100 TABLET ORAL at 10:27

## 2020-09-25 RX ADMIN — ACETAMINOPHEN 650 MG: 325 TABLET, FILM COATED ORAL at 10:21

## 2020-09-25 RX ADMIN — OXYCODONE 5 MG: 5 TABLET ORAL at 02:17

## 2020-09-25 RX ADMIN — INSULIN GLARGINE 25 UNITS: 100 INJECTION, SOLUTION SUBCUTANEOUS at 21:04

## 2020-09-25 RX ADMIN — DOCUSATE SODIUM 50MG AND SENNOSIDES 8.6MG 1 TABLET: 8.6; 5 TABLET, FILM COATED ORAL at 21:03

## 2020-09-25 RX ADMIN — CEFAZOLIN SODIUM 2 G: 10 INJECTION, POWDER, FOR SOLUTION INTRAVENOUS at 11:39

## 2020-09-25 RX ADMIN — OXYCODONE 5 MG: 5 TABLET ORAL at 15:54

## 2020-09-25 RX ADMIN — ACETAMINOPHEN 650 MG: 325 TABLET, FILM COATED ORAL at 15:54

## 2020-09-25 ASSESSMENT — PAIN DESCRIPTION - FREQUENCY
FREQUENCY: CONTINUOUS
FREQUENCY: CONTINUOUS

## 2020-09-25 ASSESSMENT — PAIN DESCRIPTION - LOCATION
LOCATION: HIP
LOCATION: HIP
LOCATION: LEG
LOCATION: HIP

## 2020-09-25 ASSESSMENT — PAIN DESCRIPTION - PROGRESSION: CLINICAL_PROGRESSION: NOT CHANGED

## 2020-09-25 ASSESSMENT — PAIN SCALES - WONG BAKER: WONGBAKER_NUMERICALRESPONSE: 4

## 2020-09-25 ASSESSMENT — PAIN SCALES - GENERAL
PAINLEVEL_OUTOF10: 2
PAINLEVEL_OUTOF10: 3
PAINLEVEL_OUTOF10: 7
PAINLEVEL_OUTOF10: 2
PAINLEVEL_OUTOF10: 6
PAINLEVEL_OUTOF10: 6
PAINLEVEL_OUTOF10: 4
PAINLEVEL_OUTOF10: 2

## 2020-09-25 ASSESSMENT — PAIN DESCRIPTION - ORIENTATION
ORIENTATION: LEFT

## 2020-09-25 ASSESSMENT — PAIN DESCRIPTION - PAIN TYPE
TYPE: SURGICAL PAIN

## 2020-09-25 ASSESSMENT — PAIN DESCRIPTION - ONSET
ONSET: ON-GOING
ONSET: ON-GOING

## 2020-09-25 ASSESSMENT — PAIN - FUNCTIONAL ASSESSMENT
PAIN_FUNCTIONAL_ASSESSMENT: ACTIVITIES ARE NOT PREVENTED
PAIN_FUNCTIONAL_ASSESSMENT: ACTIVITIES ARE NOT PREVENTED

## 2020-09-25 NOTE — PROGRESS NOTES
Physical Therapy    Facility/Department: [facility] C5 - MED SURG/ORTHO  Initial Assessment    NAME: Lolis Simon  : 1950  MRN: [id number]    Date of Service: 2020    Discharge Recommendations:  24 hour supervision or assist, Home with Home health PT   PT Equipment Recommendations  Equipment Needed: Yes  Mobility Devices: Anup Ronde: Standard  If pt discharges prior to next PT session this note will serve as discharge summary. Assessment   Body structures, Functions, Activity limitations: Decreased functional mobility ; Decreased strength; Increased pain;Decreased ROM; Decreased endurance  Assessment: Pt is 66 yo female POD 1, L THR, anterolateral approach with positioning precautions, 50% PWB LLE. PLOF: lives with spouse in ranch style home, level entry at front of house. Pt has been indep with amb and ADLs. Today LLE remains numb with trace muscle functions, requires max assist with exercises for LLE. Pt needed mod assist for bed mob, and transfered to chair via STEDY. Pt will benefit from skilled PT to address post op deficits. Recommend Home 24 hr assist at discharge with home PT. [facility] will need a  for home  Treatment Diagnosis: LLE weakness, decreased mobility post L THR  Specific instructions for Next Treatment: Ex, bed mob, transfers, gait  Prognosis: Good  Decision Making: Medium Complexity  PT Education: Goals; General Safety;Gait Training;PT Role;Disease Specific Education;Home Exercise Program;Transfer Training;Weight-bearing Education;Precautions;Plan of Care; Functional Mobility Training  Patient Education: Disease Specific: Pt instructed in 50% PWB, anterolateral hip precautions, hospital safety guidelines.  Pt voices understanding  Barriers to Learning: pt very talkative, needs redirection, does voice understanding of all instructions  REQUIRES PT FOLLOW UP: Yes  Activity Tolerance  Activity Tolerance: Patient Tolerated treatment well  Activity Tolerance: Treatment limited by LLE numbness and decreased muscle function post anesthetic block. BP sitting /84, Post standing 2 x in STEDY 138/80       Patient Diagnosis(es): The encounter diagnosis was Primary osteoarthritis of left hip.     has a past medical history of Atrial fibrillation ([address] Diabetes mellitus ([address] Hyperlipidemia, Hypertension, FADI (obstructive sleep apnea), and PONV (postoperative nausea and vomiting). has a past surgical history that includes Tubal ligation; Hysterectomy; joint replacement (Bilateral); Rotator cuff repair (Bilateral); other surgical history (11/12/2014); Finger trigger release (Right, 10/04/2017); eye surgery (Bilateral); Tonsillectomy; and Colonoscopy. Restrictions  Restrictions/Precautions: Weight Bearing, ROM Restrictions  Left Lower Extremity Weight Bearing: Partial Weight Bearing  Partial Weight Bearing Percentage Or Pounds: 50% WB  Hip Precautions: No ADduction, No hip external rotation  Vision/Hearing  Vision: Impaired  Vision Exceptions: Wears glasses for reading  Hearing: Within functional limits     Subjective  General  Chart Reviewed: Yes  Patient assessed for rehabilitation services?: Yes  Family / Caregiver Present: No  Referring Practitioner: Evaristo DICKEY for Dr. Jimmy Ramachandran  Referral Date : 09/24/20  Diagnosis: L THR anterolateral approach  General Comment  Comments: RN cleared pt for therapy, pt resting in bed on approach  Subjective  Subjective: Pt agrees to therapy, voices understanding of hip positioning precautions and 50% WB restriction. Pt reports LLE is numb, she has minimal sensation to touch, cannot move her leg.   Pain Screening  Patient Currently in Pain: Yes  Pain Assessment  Pain Assessment: 0-10  Patient's Stated Pain Goal: 2  Pain Type: Surgical pain  Pain Location: Leg  Pain Orientation: Left  Pain Frequency: Continuous  Pain Onset: On-going  Clinical Progression: Not changed  Functional Pain Assessment: Activities are not prevented  Non-Pharmaceutical Pain Intervention(s): Emotional support;Repositioned  Response to Pain Intervention: Patient Satisfied  Pre Treatment Pain Screening  Intervention List: Patient able to continue with treatment    Orientation  Overall Orientation Status: Within Normal Limits  Social/Functional History  Social/Functional History  Lives With: Spouse  Type of Home: (Condo)  Home Layout: One level  Home Access: Level entry, Stairs to enter without rails  Entrance Stairs - Number of Steps: 1 LISANDRA through garage  Bathroom Shower/Tub: Walk-in shower  Bathroom Toilet: Standard  Bathroom Equipment: Grab bars in [address] [place]: 4 wheeled walker  ADL Assistance: Independent(some assistance with BM hygiene and socks/shoes)  [address]: Independent  Homemaking Responsibilities: Yes  Meal Prep Responsibility: Primary  Laundry Responsibility: Primary  Cleaning Responsibility: Primary  Ambulation Assistance: Independent  Transfer Assistance: Independent  Objective   RLE AROM: WFL  LLE PROM: WFL(limited at hip due to post op positioning precautions)  Strength RLE: WFL  Strength LLE: (LLE remains numb from anesthetic block, trace DF and Quads)        Bed mobility  Rolling to Left: Minimal assistance(use of bed rail)  Supine to Sit: Moderate assistance(HOB partially elevated, cues for technique, assist for trunk and LEs)  Transfers  Sit to Stand: Minimal Assistance  Stand to sit: Minimal Assistance  Bed to Chair: Dependent/Total(STEDY BED TO CHAIR, LLE NUMB)  Ambulation  Ambulation?: No(unalbe. LLE remains numb from anesthetic block.  Stood 2 x in [employer] on RLE only)        Exercises  Straight Leg Raise: 10 x uninvolved RLE only  Quad Sets: 10 x B, trace LLE  Heelslides: 10 x B, max assist LLE  Gluteal Sets: 10 x B  Knee Long Arc Quad: 10 x uninvolved RLE  Knee Short Arc Quad: 10 x RLE max assist  Ankle Pumps: 15 x B, max assist LLE     Plan   Times per week: BID 7 days wk  Times per day: Twice a day  Specific instructions for Next Treatment: Ex, bed mob, transfers, gait  Current Treatment Recommendations: Strengthening, Home Exercise Program, Endurance Training, Functional Mobility Training, Transfer Training, Gait Training, Safety Education & Training  Safety Devices  Type of devices:  All fall risk precautions in place, Left in chair, Call light within reach, Chair alarm in place, Nurse notified, Gait belt, Patient at risk for falls    AM-PAC Score     AM-PAC Inpatient Mobility without Stair Climbing Raw Score : 9 (09/25/20 0851)  AM-PAC Inpatient without Stair Climbing T-Scale Score : 32.44 (09/25/20 0851)  Mobility Inpatient CMS 0-100% Score: 76.07 (09/25/20 0851)  Mobility Inpatient without Stair CMS G-Code Modifier : CL (09/25/20 0727)     Goals  Short term goals  Time Frame for Short term goals: 2-3 days (9/27)  Short term goal 1: pt to perform bed mob with CG  Short term goal 2: pt to perform transfers SBA  Short term goal 3: pt to amb with  ft with CG  Short term goal 4: pt to participate in LE Ex 10-15 reps  Short term goal 5: pt to voice understanding of HEP, 50% WB, hip positioning precautions- MET  Patient Goals   Patient goals : \"to know my exercises\"- MET     Therapy Time   Individual Concurrent Group Co-treatment   Time In 0805         Time Out 0838         Minutes 33         Timed Code Treatment Minutes: [address] [facility] Ritchie [place], PT

## 2020-09-25 NOTE — PROGRESS NOTES
session 124/50     Patient Diagnosis(es): The primary encounter diagnosis was History of total left hip replacement. A diagnosis of Primary osteoarthritis of left hip was also pertinent to this visit. has a past medical history of Atrial fibrillation ([facility] [address] Diabetes mellitus ([facility] [address] Hyperlipidemia, Hypertension, FADI (obstructive sleep apnea), and PONV (postoperative nausea and vomiting). has a past surgical history that includes Tubal ligation; Hysterectomy; joint replacement (Bilateral); Rotator cuff repair (Bilateral); other surgical history (11/12/2014); Finger trigger release (Right, 10/04/2017); eye surgery (Bilateral); Tonsillectomy; Colonoscopy; and Total hip arthroplasty (Left, 9/24/2020). Restrictions  Restrictions/Precautions: Weight Bearing, ROM Restrictions  Lower Extremity Weight Bearing Restrictions  Left Lower Extremity Weight Bearing: Partial Weight Bearing  Partial Weight Bearing Percentage Or Pounds: 50% WB  Hip Precautions: No ADduction, No hip external rotation  Subjective   General  Chart Reviewed: Yes  Family / Caregiver Present: No  Referring Practitioner: Jessie DICKEY for Dr. Dayana Lugo: Pt agrees to therapy, voices understanding of hip positioning precautions and 50% WB restriction. Pt reports LLE is numb, she has minimal sensation to touch, cannot move her leg.   General Comment  Comments: RN cleared pt for therapy, pt resting in bed on approach  Pain Screening  Patient Currently in Pain: Yes  Pain Assessment  Pain Assessment: Faces  Bill-Baker Pain Rating: Hurts little more  Patient's Stated Pain Goal: 2  Pain Type: Surgical pain  Pain Location: Hip  Pain Orientation: Left  Pain Frequency: Continuous  Pain Onset: On-going  Clinical Progression: Not changed  Functional Pain Assessment: Activities are not prevented  Non-Pharmaceutical Pain Intervention(s): Emotional support;Repositioned  Response to Pain Intervention: Patient Satisfied  Pre Treatment Pain Screening  Intervention List: Patient able to continue with treatment    Orientation  Orientation  Overall Orientation Status: Within Normal Limits     Objective   Bed mobility  Rolling to Left: Minimal assistance(use of bed rail)  Supine to Sit: Moderate assistance  Transfers  Sit to Stand: Minimal Assistance(in STEDY)  Stand to sit: Minimal Assistance  Bed to Chair: Dependent/Total(Stedy bed to chair, anesthetic block still working, starting to get DF, Poor quads)  Comment: Transfers practiced from EOB, toilet, and seat of STEDY  Ambulation  Ambulation?: No(unable due to diminished quad function from anesthetic block)        Exercises  Straight Leg Raise: 10 x uninvolved RLE only  Quad Sets: 15 x B  Heelslides: 15 x LLE assisted  Gluteal Sets: 15 x B  Knee Long Arc Quad: 10 x uninvolved RLE  Knee Short Arc Quad: 10 x RLE max assist  Ankle Pumps: 15 x B, LLE 3-/5     Comment: Assisted to restroom to urinate, Pt performed hygeine indep, needed assist with garments.  Min assist toilet transfer with [address] Mobility without Stair Climbing Raw Score : 9 (09/25/20 0851)  AM-PAC Inpatient without Stair Climbing T-Scale Score : 32.44 (09/25/20 0851)  Mobility Inpatient CMS 0-100% Score: 76.07 (09/25/20 0851)  Mobility Inpatient without Stair CMS G-Code Modifier : CL (09/25/20 0184)   Goals  Short term goals  Time Frame for Short term goals: 2-3 days (9/27)  Short term goal 1: pt to perform bed mob with CG  Short term goal 2: pt to perform transfers SBA  Short term goal 3: pt to amb with  ft with CG  Short term goal 4: pt to participate in LE Ex 10-15 reps- MET 2nd session on 9/25  Short term goal 5: pt to voice understanding of HEP, 50% WB, hip positioning precautions- MET  Patient Goals   Patient goals : \"to know my exercises\"- MET    Plan    Plan  Times per week: BID 7 days wk  Times per day: Twice a day  Specific instructions for Next Treatment: Ex, bed mob, transfers, gait  Current Treatment Recommendations: Strengthening, Home Exercise Program, Endurance Training, Functional Mobility Training, Transfer Training, Gait Training, Safety Education & Training  Safety Devices  Type of devices:  All fall risk precautions in place, Left in chair, Call light within reach, Chair alarm in place, Nurse notified, Gait belt, Patient at risk for falls     Therapy Time   Individual Concurrent Group Co-treatment   Time In 1300         Time Out 1338         Minutes 38         Timed Code Treatment Minutes: [address] [facility] [place] Josue, PT

## 2020-09-25 NOTE — PROGRESS NOTES
Delivered walker and shower chair to [facility] room.   Thanks for the referral.  Jesús Ball  9/25/2020

## 2020-09-25 NOTE — DISCHARGE INSTR - COC
Continuity of Care Form    Patient Name: Brenda Barrera   :  1950  MRN:  [id number]    Admit date:  2020  Discharge date:  ***    Code Status Order: Full Code   Advance Directives:   [facility] Documentation       Date/Time Healthcare Directive Type of Healthcare Directive Copy in [address] Agent's Name Healthcare Agent's Phone Number    20 0125  Yes, patient has an advance directive for healthcare treatment  Living will  No, copy requested from family  --  --  --    20 1420  Yes, patient has an advance directive for healthcare treatment  --  No, copy requested from clinic  --  --  --    20 1516  Yes, patient has an advance directive for healthcare treatment  --  --  --  --  --            Admitting Physician:  Raza Kellogg MD  PCP: Melanie Villanueva DO    Discharging Nurse: [facility] Unit/Room#: 7098/9386-74  Discharging Unit Phone Number: ***    Emergency Contact:   Extended Emergency Contact Information  Primary Emergency Contact: Mike Baum  Address: [address] Phone: [phone]  Mobile Phone: [phone]  Relation: Spouse  Secondary Emergency Contact: Courtney [address] Phone: [phone]  Mobile Phone: [phone]  Relation: Child    Past Surgical History:  Past Surgical History:   Procedure Laterality Date    COLONOSCOPY      EYE SURGERY Bilateral     cataracts    FINGER TRIGGER RELEASE Right 10/04/2017    long    HYSTERECTOMY      JOINT REPLACEMENT Bilateral     bilateral knee replacements    OTHER SURGICAL HISTORY  2014    phacoemulsification of cataract with intraocular lens implant right eye    ROTATOR CUFF REPAIR Bilateral     bilateral rotator cuff repair    TONSILLECTOMY      TOTAL HIP ARTHROPLASTY Left 2020    LEFT TOTAL HIP REPLACEMENT WITH Luane Yoshi & NEPHEW performed by [facility] Linda Ardon MD at [address]         Immunization History: There is no immunization history on file for this patient. Active Problems:  Patient Active Problem List   Diagnosis Code    Obesity, Class III, BMI 40-49.9 (morbid obesity) ([facility]) E66.01    Diabetes ([facility]) E11.9    Essential hypertension I10    Shortness of breath R06.02    Paroxysmal atrial fibrillation ([facility]) I48.0    Olecranon bursitis, left elbow M70.22    Trigger finger, right middle finger M65.331    Osteoarthritis of finger of right hand M19.041    History of total left hip replacement Z96.642       Isolation/Infection:   Isolation            No Isolation          Patient Infection Status       None to display            Nurse Assessment:  Last Vital Signs: BP (!) 112/55   Pulse 57   Temp 97.6 °F (36.4 °C) (Oral)   Resp 16   Ht 5' 1\" (1.549 m)   Wt 223 lb (101.2 kg)   SpO2 99%   BMI 42.14 kg/m²     Last documented pain score (0-10 scale): Pain Level: 3  Last Weight:   Wt Readings from Last 1 Encounters:   09/24/20 223 lb (101.2 kg)     Mental Status:  oriented and alert    IV Access:  - None    Nursing Mobility/ADLs:  Walking   Assisted  Transfer  Assisted  Bathing  Assisted  Dressing  Assisted  Toileting  Assisted  Feeding  Independent  Med Admin  Independent  Med Delivery   whole    Wound Care Documentation and Therapy:        Elimination:  Continence:   · Bowel: Yes  · Bladder: Yes  Urinary Catheter: None   Colostomy/Ileostomy/Ileal Conduit: No       Date of Last BM: ***    Intake/Output Summary (Last 24 hours) at 9/25/2020 1130  Last data filed at 9/25/2020 0330  Gross per 24 hour   Intake 1250 ml   Output 680 ml   Net 570 ml     I/O last 3 completed shifts: In: 1250 [I.V.:1000; Blood:250]  Out: 680 [Urine:180; Blood:500]    Safety Concerns:      At Risk for Falls    Impairments/Disabilities:      None    Nutrition Therapy:  Current Nutrition Therapy:   - Oral Diet:  General    Routes of Feeding: Oral  Liquids: No Restrictions  Daily Fluid Restriction: no  Last Modified Barium Swallow with Video (Video Swallowing Test): not done    Treatments at the Time of Hospital Discharge:   Respiratory Treatments: ***  Oxygen Therapy:  is not on home oxygen therapy. Ventilator:    - No ventilator support    Rehab Therapies: Physical Therapy and Occupational Therapy  Weight Bearing Status/Restrictions: Partial weight bearing (30-50%) only on leg left leg  Other Medical Equipment (for information only, NOT a DME order):  walker  Other Treatments: ***    Patient's personal belongings (please select all that are sent with patient):  None    RN SIGNATURE:  Electronically signed by Donold Boxer, RN on 9/26/20 at 6:07 PM EDT    CASE MANAGEMENT/SOCIAL WORK SECTION    Inpatient Status Date: 9/24/20    Readmission Risk Assessment Score:  Readmission Risk              Risk of Unplanned Readmission:        10           Discharging to Facility/ Agency   · Name: [facility]  · Address:  · Phone: [phone]  · Fax:    Dialysis Facility (if applicable)   · Name:  · Address:  · Dialysis Schedule:  · Phone:  · Fax:    / signature: Electronically signed by Win De La Cruz RN on 9/26/20 at 10:06 AM EDT    PHYSICIAN SECTION    Prognosis: Good    Condition at Discharge: Stable    Rehab Potential (if transferring to Rehab): Good    Recommended Labs or Other Treatments After Discharge:     Physician Certification: I certify the above information and transfer of Jose Precise  is necessary for the continuing treatment of the diagnosis listed and that she requires Home Care for less 30 days.      Update Admission H&P: No change in H&P    PHYSICIAN SIGNATURE:  Electronically signed by NOBLE Esparza on 9/25/20 at 11:30 AM EDT

## 2020-09-25 NOTE — CARE COORDINATION
DCP met with Wyatt DICKEY Re: d/c planning. CM met with pt at bedside to discuss [facility].. Pt has no agency preference and OK for referral to [address] [place] [address]. Spoke to [place] with [facility]elle. Therapy recs for SW and possibly SC and RTS. Spoke to Odalys Hancock with [facility]. No further needs identified by DCP. Please contact should further needs arise.  Murphy Flor RN

## 2020-09-25 NOTE — PROGRESS NOTES
Department of Orthopedic Surgery  Physician Assistant   Progress Note    Subjective:     Post-Operative Day: 1 Status Post left Total Hip Arthroplasty  Systemic or Specific Complaints: Leg numb today. On O2 overnight and this morning. Feeling a little better. Objective:     Patient Vitals for the past 24 hrs:   BP Temp Temp src Pulse Resp SpO2 Height Weight   09/25/20 0743 (!) 112/55 97.6 °F (36.4 °C) Oral 57 16 -- -- --   09/25/20 0425 119/67 95.3 °F (35.2 °C) Temporal 50 15 99 % -- --   09/24/20 2303 115/63 96.4 °F (35.8 °C) Temporal 61 14 100 % -- --   09/24/20 2130 122/76 96.4 °F (35.8 °C) Temporal 67 14 99 % -- --   09/24/20 1945 (!) 128/56 -- -- 60 12 94 % -- --   09/24/20 1930 -- -- -- 70 12 97 % -- --   09/24/20 1915 121/62 -- -- 73 12 94 % -- --   09/24/20 1900 (!) 120/57 -- -- 70 12 97 % -- --   09/24/20 1845 (!) 132/56 -- -- 68 12 96 % -- --   09/24/20 1840 131/60 -- -- 67 12 91 % -- --   09/24/20 1835 123/62 -- -- 65 12 90 % -- --   09/24/20 1830 (!) 113/59 -- -- 83 12 91 % -- --   09/24/20 1825 123/63 -- -- 78 12 (!) 88 % -- --   09/24/20 1820 (!) 83/45 -- -- 78 12 (!) 89 % -- --   09/24/20 1815 (!) 81/51 -- -- 68 12 (!) 87 % -- --   09/24/20 1445 136/79 97 °F (36.1 °C) Temporal 93 16 98 % 5' 1\" (1.549 m) 223 lb (101.2 kg)       General: alert, appears stated age and cooperative   Wound: Wound clean and dry no evidence of infection. , Wound Intact and Positive for Edema   Motion: Painful range of Motion   DVT Exam: No evidence of DVT seen on physical exam.       NVI in lower extremity. Thigh swollen but soft. Moving foot and ankle. Data Review  CBC:   Lab Results   Component Value Date    WBC 10.8 09/25/2020    RBC 4.30 09/25/2020    HGB 12.2 09/25/2020    HCT 36.3 09/25/2020     09/25/2020       Assessment:     Status Post left Total Hip Arthroplasty. Hypoxia     Plan:      1: Continues current post-op course  - will try to wean O2 today for the hypoxia.  Limit opioids to improve hypoxia. Has dense block from Anes in the left leg so will await that to wear off and attempt PT as tolerated. With above concerns I do not anticipate her going home today for safety reasons. Will likely be able to d/c tomorrow if hypoxia resolved and functional ability returns.   2:  Continue Deep venous thrombosis prophylaxis  3:  Continue physical therapy  4:  Continue Pain Control

## 2020-09-25 NOTE — PROGRESS NOTES
bilaterally       Assessment/Plan:    Active Hospital Problems    Diagnosis    History of total left hip replacement [Z96.642]    Essential hypertension [I10]    Diabetes ([facility] [address] [E11.9]    Obesity, Class III, BMI 40-49.9 (morbid obesity) ([facility] [address] [E66.01]     Left hip pain in setting of osteoarthritis  -s/p left total hip replacement  -post op management and pain control per ortho  -prn pain medication   -cbc stable    Hypoxia: CXR unremarkable. Likely post-op atelectasis. Continue IS. Wean O2 as tolerated. Essential HTN  -continue HCTZ and losartan    Morbid obesity  With Body mass index is 42.1 kg/m². Complicating assessment and treatment. Placing patient at risk for multiple co-morbidities as well as early death and contributing to the patient's presentation.  Counseled on weight loss    DM2, uncontrolled  -hemglobin a1c 6.2 on 9/10/2020  -continue lantus  -hold home metformin  -ldssi  -poct ac/hs  -hypoglycemia protocol  -carb control diet    DVT Prophylaxis: Lovenox  Diet: DIET CARB CONTROL;  Code Status: Full Code  PT/OT Eval Status: Ongoing    Dispo - per Susan Sterling MD

## 2020-09-25 NOTE — CONSULTS
Hospital Medicine  Consult History & Physical        Chief Complaint:  Left hip pain    Date of Service: Pt seen/examined in consultation on 9/24/2020    History Of Present Illness:      65 y.o. female, s/p left total hip replacement,  who we are asked to see/evaluate by Aniyah Bob MD for medical management of hypertension. The patient is doing well postoperatively. She is resting comfortably and quietly in bed. The patient stated she is taking p.o. well and her pain is well controlled at this time. She did not work with PT/OT today. The patient's blood pressure is well controlled at this time. Past Medical History:        Diagnosis Date    Atrial fibrillation ([address]     Diabetes mellitus ([facility] [address]     Hyperlipidemia     Hypertension     FADI (obstructive sleep apnea)     CAN'T TOLERATE DEVICE    PONV (postoperative nausea and vomiting)      Past Surgical History:        Procedure Laterality Date    COLONOSCOPY      EYE SURGERY Bilateral     cataracts    FINGER TRIGGER RELEASE Right 10/04/2017    long    HYSTERECTOMY      JOINT REPLACEMENT Bilateral     bilateral knee replacements    OTHER SURGICAL HISTORY  11/12/2014    phacoemulsification of cataract with intraocular lens implant right eye    ROTATOR CUFF REPAIR Bilateral     bilateral rotator cuff repair    TONSILLECTOMY      TUBAL LIGATION       Medications Prior to Admission:    Prior to Admission medications    Medication Sig Start Date End Date Taking?  Authorizing Provider   glipiZIDE (GLUCOTROL XL) 5 MG extended release tablet Take 5 mg by mouth daily   Yes Historical Provider, MD   Cholecalciferol (VITAMIN D-3) 25 MCG (1000 UT) CAPS Take by mouth daily   Yes Historical Provider, MD   flecainide (TAMBOCOR) 50 MG tablet TAKE ONE TABLET BY MOUTH TWICE A DAY 9/16/20  Yes KIMO Sandoval CNP   Probiotic Product (PROBIOTIC PO) Take 1 tablet by mouth daily   Yes Historical Provider, MD   hydrochlorothiazide (HYDRODIURIL) 25 MG tablet Take 1 tablet by mouth daily 10/2/18  Yes KIMO Zee CNP   atorvastatin (LIPITOR) 40 MG tablet Take 40 mg by mouth nightly  4/20/17  Yes Historical Provider, MD   metFORMIN (GLUCOPHAGE) 500 MG tablet Take 1,000 mg by mouth 2 times daily (with meals)    Yes Historical Provider, MD   losartan (COZAAR) 100 MG tablet Take 100 mg by mouth daily. Yes Historical Provider, MD   warfarin (COUMADIN) 2 MG tablet Take 2 tablets by mouth daily 7/13/16   KIMO Zee CNP     Allergies:  Furosemide; Hydrocodone-acetaminophen; Sitagliptin; and Morphine    Social History:      The patient currently lives at home. Plans to return home upon discharge    TOBACCO:   reports that she has never smoked. She has never used smokeless tobacco.  ETOH:   reports no history of alcohol use. Family History:     Reviewed in detail. Positive as follows:        Problem Relation Age of Onset   [address] [facility] Cancer Mother         lung    Cancer Father         lung     REVIEW OF SYSTEMS:   Pertinent positives as noted in the HPI. All other systems reviewed and negative. PHYSICAL EXAM PERFORMED:    /76   Pulse 67   Temp 96.4 °F (35.8 °C) (Temporal)   Resp 14   Ht 5' 1\" (1.549 m)   Wt 223 lb (101.2 kg)   SpO2 99%   BMI 42.14 kg/m²      General appearance: Pleasant, obese female in no apparent distress, appears stated age and cooperative. HEENT:  Pupils equal, round, and reactive to light. Extra ocular muscles intact. Conjunctivae/corneas clear. Neck: Supple, with full range of motion. No jugular venous distention. Trachea midline. Respiratory:  Normal respiratory effort. Clear to auscultation, bilaterally without Rales/Wheezes/Rhonchi. Cardiovascular: Regular rate and rhythm with normal S1/S2 without murmurs, rubs or gallops. Abdomen: Soft, obese, round non-tender, non-distended with normal bowel sounds. Musculoskeletal: No clubbing, cyanosis or edema bilaterally.  decreased ROM LLE d/t surgery  Skin: Skin color, texture, turgor normal.  Left hip OR dressing C,D & I, not taken down. Neurologic:  Neurovascularly intact. Cranial nerves: II-XII intact, grossly non-focal.  Psychiatric: Alert and oriented, thought content appropriate, normal insight  Capillary Refill: Brisk,< 3 seconds   Peripheral Pulses: +2 palpable, equal bilaterally     Labs:     Recent Labs     09/24/20  1420   INR 1.29*     Urinalysis:    Lab Results   Component Value Date    NITRU Negative 09/10/2020    WBCUA 21-50 09/10/2020    BACTERIA 2+ 09/10/2020    RBCUA 3-4 09/10/2020    BLOODU TRACE-INTACT 09/10/2020    SPECGRAV 1.020 09/10/2020    GLUCOSEU Negative 09/10/2020     Radiology: I have reviewed the radiology reports with the following interpretation:     No orders to display     ASSESSMENT:    C/Concepcion Banks [address] Problems    Diagnosis Date Noted    History of total left hip replacement [Z96.642] 09/24/2020    Essential hypertension [I10] 08/25/2014    Diabetes ([facility] [address] [E11.9] 08/25/2014    Obesity, Class III, BMI 40-49.9 (morbid obesity) ([facility] [address] [E66.01] 08/25/2014     PLAN:    Left hip pain in setting of osteoarthritis  -s/p left total hip replacement  -post op management and pain control per ortho  -continue cefazolin  -prn pain medication   -cbc in am    Essential HTN  -continue HCTZ and losartan    Morbid obesity  With Body mass index is 42.1 kg/m². Complicating assessment and treatment. Placing patient at risk for multiple co-morbidities as well as early death and contributing to the patient's presentation.  Counseled on weight loss    DM2, uncontrolled  -  -hemglobin a1c 6.2 on 9/10/2020  -continue lantus  -hold home metformin  -ldssi  -poct ac/hs  -hypoglycemia protocol  -carb control diet    DVT Prophylaxis: Lovenox    Diet: DIET GENERAL;     Code Status: Full Code    PT/OT Eval Status: Active and ongoing    Dispo - per primary team    Thank you for the consultation, will follow up as needed    Electronically signed by Onel Combs APRN - CNP on 9/24/20 at 10:01 PM EDT   ------------------------------Dr. Soledad Leyva----------------------------------------

## 2020-09-25 NOTE — PROGRESS NOTES
Occupational Therapy   Occupational Therapy Initial Assessment/Treatment   Date: 2020   Patient Name: Manisha Haas  MRN: [id number]     : 1950    Date of Service: 2020    Discharge Recommendations:  Home with Home health OT, 24 hour supervision or assist, S Level 1  OT Equipment Recommendations  Other: shower chair; CTA for additional bathroom equipment    Assessment   Performance deficits / Impairments: Decreased functional mobility ; Decreased ADL status; Decreased sensation;Decreased endurance;Decreased balance;Decreased high-level IADLs  Assessment: Patient is a Chelo Mitten old female s/p Left NILAY. Patient reports PLOF as independent with ADLs. Patient currently functioning slightly below baseline due to the above mentioned deficits. Patient limited in evaluation by decreased sensation and motor control to LLE from nerve block. Anticipate patient will make good progress and be safe to discharge home with 24hr A and HHOT. Prognosis: Good  Decision Making: Low Complexity  OT Education: Plan of Care;OT Role;Precautions; ADL Adaptive Strategies;Transfer Training;Orientation;Equipment  Patient Education: disease specific education s/p NILAY  REQUIRES OT FOLLOW UP: Yes  Activity Tolerance  Activity Tolerance: Patient Tolerated treatment well  Activity Tolerance: Patient continues to demonstrate impaired motor control due to numbness from nerve block  Safety Devices  Safety Devices in place: Yes  Type of devices: All fall risk precautions in place; Left in chair;Call light within reach; Chair alarm in place;Nurse notified           Patient Diagnosis(es): The primary encounter diagnosis was History of total left hip replacement. A diagnosis of Primary osteoarthritis of left hip was also pertinent to this visit. has a past medical history of Atrial fibrillation ([address] Diabetes mellitus ([address] Hyperlipidemia, Hypertension, FADI (obstructive sleep apnea), and PONV (postoperative nausea and vomiting).    has a past surgical history that includes Tubal ligation; Hysterectomy; joint replacement (Bilateral); Rotator cuff repair (Bilateral); other surgical history (11/12/2014); Finger trigger release (Right, 10/04/2017); eye surgery (Bilateral); Tonsillectomy; Colonoscopy; and Total hip arthroplasty (Left, 9/24/2020).            Restrictions  Restrictions/Precautions  Restrictions/Precautions: Weight Bearing, ROM Restrictions  Lower Extremity Weight Bearing Restrictions  Left Lower Extremity Weight Bearing: Partial Weight Bearing  Partial Weight Bearing Percentage Or Pounds: 50% WB  Position Activity Restriction  Hip Precautions: No ADduction, No hip external rotation    Subjective   General  Chart Reviewed: Yes, Progress Notes, History and Physical, Imaging, Operative Notes, Orders  Patient assessed for rehabilitation services?: Yes  Family / Caregiver Present: No  Diagnosis: Left NILAY  Patient Currently in Pain: Yes  Pain Assessment  Pain Assessment: Faces  Pain Level: 2  Bill-Baker Pain Rating: Hurts little more  Pain Type: Surgical pain  Pain Location: Hip  Pain Orientation: Left  Pain Frequency: Continuous  Pain Onset: On-going  Functional Pain Assessment: Activities are not prevented  Non-Pharmaceutical Pain Intervention(s): Emotional support;Repositioned  Response to Pain Intervention: Patient Satisfied  Pre Treatment Pain Screening  Intervention List: Patient able to continue with treatment;Patient declined any intervention  Vital Signs  Patient Currently in Pain: Yes  Oxygen Therapy  O2 Device: Nasal cannula  Social/Functional History  Social/Functional History  Lives With: Spouse  Type of Home: (Condo)  Home Layout: One level  Home Access: Level entry, Stairs to enter without rails  Entrance Stairs - Number of Steps: 1 LISANDRA through garage  Bathroom Shower/Tub: Walk-in shower  Bathroom Toilet: Standard  Bathroom Equipment: Grab bars in shower  Home Equipment: 4 wheeled walker  ADL Assistance: Independent(some assistance with term goals  Time Frame for Short term goals: by 9/27/20  Short term goal 1: Complete LB dressing with min A and use of AE as needed  Short term goal 2: Complete toilet transfers with supervision  Short term goal 3: Complete toileting with supervision  Short term goal 4: Verbalize understanding of safe car transfers via handout by 9/26/20  Patient Goals   Patient goals :  \"To be able to use the bathroom independently\"       Therapy Time   Individual Concurrent Group Co-treatment   Time In 0803         Time Out 0840         Minutes 37         Timed Code Treatment Minutes: [address] [facility], OTR/L

## 2020-09-26 VITALS
RESPIRATION RATE: 18 BRPM | HEART RATE: 105 BPM | TEMPERATURE: 98.3 F | BODY MASS INDEX: 42.1 KG/M2 | SYSTOLIC BLOOD PRESSURE: 135 MMHG | OXYGEN SATURATION: 99 % | WEIGHT: 223 LBS | DIASTOLIC BLOOD PRESSURE: 77 MMHG | HEIGHT: 61 IN

## 2020-09-26 LAB
GLUCOSE BLD-MCNC: 124 MG/DL (ref 70–99)
GLUCOSE BLD-MCNC: 143 MG/DL (ref 70–99)
GLUCOSE BLD-MCNC: 150 MG/DL (ref 70–99)
HCT VFR BLD CALC: 35.1 % (ref 36–48)
HEMOGLOBIN: 11.7 G/DL (ref 12–16)
INR BLD: 1.4 (ref 0.86–1.14)
MCH RBC QN AUTO: 27.7 PG (ref 26–34)
MCHC RBC AUTO-ENTMCNC: 33.2 G/DL (ref 31–36)
MCV RBC AUTO: 83.4 FL (ref 80–100)
PDW BLD-RTO: 13.3 % (ref 12.4–15.4)
PERFORMED ON: ABNORMAL
PLATELET # BLD: 221 K/UL (ref 135–450)
PMV BLD AUTO: 7.9 FL (ref 5–10.5)
PROTHROMBIN TIME: 16.3 SEC (ref 10–13.2)
RBC # BLD: 4.21 M/UL (ref 4–5.2)
WBC # BLD: 11.6 K/UL (ref 4–11)

## 2020-09-26 PROCEDURE — 97110 THERAPEUTIC EXERCISES: CPT

## 2020-09-26 PROCEDURE — 6370000000 HC RX 637 (ALT 250 FOR IP): Performed by: PHYSICIAN ASSISTANT

## 2020-09-26 PROCEDURE — 97535 SELF CARE MNGMENT TRAINING: CPT

## 2020-09-26 PROCEDURE — 2580000003 HC RX 258: Performed by: PHYSICIAN ASSISTANT

## 2020-09-26 PROCEDURE — 6360000002 HC RX W HCPCS: Performed by: PHYSICIAN ASSISTANT

## 2020-09-26 PROCEDURE — 97116 GAIT TRAINING THERAPY: CPT

## 2020-09-26 PROCEDURE — 36415 COLL VENOUS BLD VENIPUNCTURE: CPT

## 2020-09-26 PROCEDURE — 97530 THERAPEUTIC ACTIVITIES: CPT

## 2020-09-26 PROCEDURE — 85027 COMPLETE CBC AUTOMATED: CPT

## 2020-09-26 PROCEDURE — 85610 PROTHROMBIN TIME: CPT

## 2020-09-26 RX ADMIN — METFORMIN HYDROCHLORIDE 1000 MG: 500 TABLET ORAL at 17:35

## 2020-09-26 RX ADMIN — DOCUSATE SODIUM 50MG AND SENNOSIDES 8.6MG 1 TABLET: 8.6; 5 TABLET, FILM COATED ORAL at 08:49

## 2020-09-26 RX ADMIN — ACETAMINOPHEN 650 MG: 325 TABLET, FILM COATED ORAL at 03:40

## 2020-09-26 RX ADMIN — WARFARIN SODIUM 4 MG: 2 TABLET ORAL at 17:35

## 2020-09-26 RX ADMIN — METFORMIN HYDROCHLORIDE 1000 MG: 500 TABLET ORAL at 08:47

## 2020-09-26 RX ADMIN — ENOXAPARIN SODIUM 40 MG: 40 INJECTION SUBCUTANEOUS at 08:49

## 2020-09-26 RX ADMIN — GLIPIZIDE 5 MG: 5 TABLET ORAL at 06:45

## 2020-09-26 RX ADMIN — ACETAMINOPHEN 650 MG: 325 TABLET, FILM COATED ORAL at 08:48

## 2020-09-26 RX ADMIN — FLECAINIDE ACETATE 50 MG: 100 TABLET ORAL at 08:49

## 2020-09-26 RX ADMIN — SODIUM CHLORIDE, PRESERVATIVE FREE 10 ML: 5 INJECTION INTRAVENOUS at 11:12

## 2020-09-26 RX ADMIN — OXYCODONE 10 MG: 5 TABLET ORAL at 11:18

## 2020-09-26 RX ADMIN — INSULIN LISPRO 1 UNITS: 100 INJECTION, SOLUTION INTRAVENOUS; SUBCUTANEOUS at 17:36

## 2020-09-26 RX ADMIN — OXYCODONE 10 MG: 5 TABLET ORAL at 15:27

## 2020-09-26 ASSESSMENT — PAIN SCALES - GENERAL
PAINLEVEL_OUTOF10: 7
PAINLEVEL_OUTOF10: 6
PAINLEVEL_OUTOF10: 7
PAINLEVEL_OUTOF10: 6
PAINLEVEL_OUTOF10: 6
PAINLEVEL_OUTOF10: 7
PAINLEVEL_OUTOF10: 6

## 2020-09-26 ASSESSMENT — PAIN DESCRIPTION - LOCATION
LOCATION: HIP
LOCATION: HIP

## 2020-09-26 ASSESSMENT — PAIN DESCRIPTION - PAIN TYPE: TYPE: SURGICAL PAIN

## 2020-09-26 ASSESSMENT — PAIN - FUNCTIONAL ASSESSMENT: PAIN_FUNCTIONAL_ASSESSMENT: PREVENTS OR INTERFERES SOME ACTIVE ACTIVITIES AND ADLS

## 2020-09-26 ASSESSMENT — PAIN DESCRIPTION - ORIENTATION: ORIENTATION: LEFT

## 2020-09-26 NOTE — DISCHARGE SUMMARY
Department of Orthopedic Surgery  Physician Assistant   Discharge Summary      The Jose Hou is a 65 y.o. female underwent total hip replacement procedure without complication. Jose Hou was admitted to the floor following their recovery in the PACU.      Discharge Diagnosis  left Hip Replacement    Current Inpatient Medications    Current Facility-Administered Medications: flecainide (TAMBOCOR) tablet 50 mg, 50 mg, Oral, BID  hydroCHLOROthiazide (HYDRODIURIL) tablet 25 mg, 25 mg, Oral, Daily  glipiZIDE (GLUCOTROL) tablet 5 mg, 5 mg, Oral, QAM AC  losartan (COZAAR) tablet 100 mg, 100 mg, Oral, Daily  metFORMIN (GLUCOPHAGE) tablet 1,000 mg, 1,000 mg, Oral, BID WC  warfarin (COUMADIN) tablet 4 mg, 4 mg, Oral, Daily  0.9 % sodium chloride infusion, , Intravenous, Continuous  sodium chloride flush 0.9 % injection 10 mL, 10 mL, Intravenous, 2 times per day  sodium chloride flush 0.9 % injection 10 mL, 10 mL, Intravenous, PRN  acetaminophen (TYLENOL) tablet 650 mg, 650 mg, Oral, Q6H  morphine (PF) injection 2 mg, 2 mg, Intravenous, Q2H PRN **OR** morphine (PF) injection 4 mg, 4 mg, Intravenous, Q2H PRN  sennosides-docusate sodium (SENOKOT-S) 8.6-50 MG tablet 1 tablet, 1 tablet, Oral, BID  magnesium hydroxide (MILK OF MAGNESIA) 400 MG/5ML suspension 30 mL, 30 mL, Oral, Daily PRN  glucose (GLUTOSE) 40 % oral gel 15 g, 15 g, Oral, PRN  dextrose 50 % IV solution, 12.5 g, Intravenous, PRN  glucagon (rDNA) injection 1 mg, 1 mg, Intramuscular, PRN  dextrose 5 % solution, 100 mL/hr, Intravenous, PRN  oxyCODONE (ROXICODONE) immediate release tablet 5 mg, 5 mg, Oral, Q4H PRN **OR** oxyCODONE (ROXICODONE) immediate release tablet 10 mg, 10 mg, Oral, Q4H PRN  enoxaparin (LOVENOX) injection 40 mg, 40 mg, Subcutaneous, Daily  insulin glargine (LANTUS) injection vial 25 Units, 0.25 Units/kg, Subcutaneous, Nightly  insulin lispro (HUMALOG) injection vial 0-6 Units, 0-6 Units, Subcutaneous, TID WC  ondansetron (ZOFRAN) injection 4 mg, 4 mg, Intravenous, Q6H PRN  insulin lispro (HUMALOG) injection vial 0-3 Units, 0-3 Units, Subcutaneous, Nightly    Post-operatively the patients diet was advanced as tolerated and their incision was checked on POD #1. The incision is dressing in place, clean, dry and intact with no signs of infection. The patient remained neurovascularly intact in the lower extremity and had intact pulses distally. Patients calf remained soft and showed no evidence of DVT. The patient was able to move their leg and ankle/foot without any problems post-operatively. Physical therapy and occupational therapy were consulted and began working with the patient post-operatively. The patient progressed with PT/OT as would be expected and continued to improve through their stay. The patients pain was initially controlled with IV medications but we were able to transition to oral pain medications soon after arrival to the floor and their pain remained under good control through their hospital stay. From a medical standpoint the patient remained stable and continued to have the medicine team follow throughout their stay. The patients dressing was changed/incision was checked on day of d/c. The patient will be discharged at this time to Home  with their current diet restrictions and will continue to follow the hip precautions outlined to them by us and PT/OT. Condition on Discharge: Stable    Plan  Return visit in 2 weeks. .  Patient was instructed on the use of pain medications, the signs and symptoms of infection, and was given our number to call should they have any questions or concerns following discharge. For opioid prescriptions given at discharge the following statement is provided for compliance with [employer] rules.   Patient being given increased dosage/quantity of opoid pain medication in excess of [employer] guidelines which noted a 30 MED daily of opioids due to the fact that he/she has undergone major orthopaedic surgery as outlined in rule 3229-51-43. Dosages and further duration of the pain medication will be re-evaluated at her post op visit in 2 weeks. Patient was educated on dosing expectations and limits of prescribing as a result of the new [facility] rules enacted August 31, 2017. Please also note that this is not the initial opoid prescription issued to this patient but a continuation of medication utilized during the hospital admission as noted in the medical record. OARRS report has also been utilized to screen for any abuse history or suspicious activity as outlined in [place]. All efforts have been taken to prevent abuse potential and misuse of opioid medications including education, screening, and close clinical follow up.

## 2020-09-26 NOTE — PLAN OF CARE
Problem: Falls - Risk of:  Goal: Will remain free from falls  Description: Will remain free from falls  9/26/2020 0012 by Jessica Banuelos RN  Outcome: Ongoing    Goal: Absence of physical injury  Description: Absence of physical injury  9/26/2020 0012 by Jessica Banuelos RN  Outcome: Ongoing       Problem: Skin Integrity:  Goal: Will show no infection signs and symptoms  Description: Will show no infection signs and symptoms  9/26/2020 0012 by Jessica Banuelos RN  Outcome: Ongoing    Goal: Absence of new skin breakdown  Description: Absence of new skin breakdown  Outcome: Ongoing     Problem: Pain:  Goal: Pain level will decrease  Description: Pain level will decrease    Outcome: Ongoing

## 2020-09-26 NOTE — PLAN OF CARE
Problem: Falls - Risk of:  Goal: Will remain free from falls  Description: Will remain free from falls  Outcome: Completed  Goal: Absence of physical injury  Description: Absence of physical injury  Outcome: Completed     Problem: Pain:  Goal: Pain level will decrease  Description: Pain level will decrease  Outcome: Completed

## 2020-09-26 NOTE — PROGRESS NOTES
9/24/2020). Restrictions  Restrictions/Precautions  Restrictions/Precautions: Weight Bearing, ROM Restrictions  Lower Extremity Weight Bearing Restrictions  Left Lower Extremity Weight Bearing: Partial Weight Bearing  Partial Weight Bearing Percentage Or Pounds: 50% WB  Position Activity Restriction  Hip Precautions: No ADduction, No hip external rotation  Subjective   General  Chart Reviewed: Yes  Subjective  Subjective: Pt was working with OT upon arrival.  Pain Screening  Patient Currently in Pain: Yes  Pain Assessment  Pain Level: 6  Pain Type: Surgical pain  Pain Location: Hip  Vital Signs  Patient Currently in Pain: Yes       Orientation  Orientation  Overall Orientation Status: Within Normal Limits  Objective     Transfers  Sit to Stand: Contact guard assistance  Stand to sit: Contact guard assistance  Ambulation  Ambulation?: Yes  Ambulation 1  Device: Standard Walker  Assistance: Contact guard assistance  Quality of Gait: slow antalgic discontinued step with decreased stride length and heel strike.  Poor LLE swing mechnics  Distance: 2x25 ft     Balance  Standing - Static: Fair  Standing - Dynamic: Fair  Exercises  Comments: static standing with dynamic reaching below waistline to camilla and doff undergarments 2x2 mins             AM-PAC Score     AM-PAC Inpatient Mobility without Stair Climbing Raw Score : 13 (09/26/20 0837)  AM-PAC Inpatient without Stair Climbing T-Scale Score : 38.96 (09/26/20 0837)  Mobility Inpatient CMS 0-100% Score: 58.44 (09/26/20 0837)  Mobility Inpatient without Stair CMS G-Code Modifier : CK (09/26/20 0837)       Goals  Short term goals  Time Frame for Short term goals: 2-3 days (9/27)  Short term goal 1: pt to perform bed mob with CG; NT 9/26  Short term goal 2: pt to perform transfers SBA; 9/26 CGA  Short term goal 3: pt to amb with  ft with CG; 9/26 25 ft x 2 with SW  Short term goal 4: pt to participate in LE Ex 10-15 reps- MET 2nd session on 9/25  Short term goal 5:

## 2020-09-26 NOTE — PROGRESS NOTES
4 Eyes Skin Assessment     The patient is being assess for  Shift Handoff    I agree that 2 RN's have performed a thorough Head to Toe Skin Assessment on the patient. ALL assessment sites listed below have been assessed. Areas assessed by both nurses: [place]   [x]   Head, Face, and Ears   [x]   Shoulders, Back, and Chest  [x]   Arms, Elbows, and Hands   [x]   Coccyx, Sacrum, and IschIum  []   Legs, Feet, and Heels        Does the Patient have Skin Breakdown?   No         George Prevention initiated:  No   Wound Care Orders initiated:  No      [facility] nurse consulted for Pressure Injury (Stage 3,4, Unstageable, DTI, NWPT, and Complex wounds), New and Established Ostomies:  No      Nurse 1 eSignature: Electronically signed by Corey Brown RN on 9/26/20 at 12:51 PM EDT    **SHARE this note so that the co-signing nurse is able to place an eSignature**    Nurse 2 eSignature: Electronically signed by Radha Ortega RN on 9/26/20 at 2:06 PM EDT

## 2020-09-26 NOTE — CARE COORDINATION
CASE MANAGEMENT DISCHARGE SUMMARY      Discharge to: Home with [place] [address] [facility] Equipment ordered/agency: SW, shower chair    Transportation:    Family/car: yes  Confirmed discharge plan with:     Patient: yes per RN     Family, name and contact number: spouse Earle Kaba [phone]   Facility/Agency, name: [facility] [address] faxed   RN, name: Shon STANLEY    Note: Discharging nurse to complete LEILANI, reconcile AVS, and place final copy with patient's discharge packet. RN to ensure that written prescriptions for  Level II medications are sent with patient to the facility as per protocol.

## 2020-09-26 NOTE — PROGRESS NOTES
Hospitalist Progress Note  9/26/2020 4:55 PM  Subjective:   Admit Date: 9/24/2020  PCP: Nic Denton DO Status: Inpatient [101]  Interval History: Hospital Day: 3, orthostatic dizziness with therapy today. POD #2 left total hip replacement (Dr. Jo Doe). DIET CARB CONTROL; Intake/Output Summary (Last 24 hours) at 9/26/2020 1655  Last data filed at 9/26/2020 1420  Gross per 24 hour   Intake 360 ml   Output --   Net 360 ml     Medications:     flecainide  50 mg Oral BID   hydroCHLOROthiazide  25 mg Oral Daily   glipiZIDE  5 mg Oral QAM AC   losartan  100 mg Oral Daily   metFORMIN  1,000 mg Oral BID WC   warfarin  4 mg Oral Daily   acetaminophen  650 mg Oral Q6H   sennosides-docusate  1 tablet Oral BID   enoxaparin  40 mg Subcutaneous Daily   insulin glargine  0.25 Units/kg Subcutaneous Nightly   insulin lispro SSI   0-6 Units Subcutaneous TID WC / HS     Recent Labs     09/25/20  0635 09/26/20  0715   WBC 10.8 11.6*   HGB 12.2 11.7*    221   MCV 84.4 83.4     Recent Labs     09/25/20  0634      K 4.6      CO2 24   BUN 31*   CREATININE 0.8   GLUCOSE 188*     INR:   09/24/20  1420 09/25/20  0635 09/26/20  0715   1.29* 1.30* 1.40*     POC Glucose:   09/25/20  2002 09/26/20  0735 09/26/20  1119 09/26/20  1643   186* 124* 143* 150*     Portable CXR (9/25) No acute cardiac or pulmonary disease. Objective:   Vitals:  /77   Pulse 105   Temp 98.3 °F (oral)   Resp 18   Ht 5' 1\"  Wt 101.2 kg  SpO2 99% on RA  BMI 42.14 kg/m²   General appearance: alert and cooperative with exam  Lungs: clear to auscultation bilaterally  Heart: regular rate and rhythm, S1, S2 normal, no murmur, click, rub or gallop  Abdomen: soft, non-tender; bowel sounds normal; no masses,  no organomegaly  Extremities: left hip post-op dressing in place, neurovascular status intact  Neurologic: No obvious focal neurologic deficits. Assessment and Plan:   1. Post op day #2 left total hip replacement.    Pain management, DVT prophylaxis, and PT/OT weight bearing status per orthopaedics. 2. Hypoxia post-op:  Unremarkable CXR. Now on room air. 3. Type 2 Diabetes (controlled, HgbA1c 6.2%): Basal glargine insulin 25 units nightly. Cover with a \"sliding scale\" lispro moderate scale prandial correction insulin. May restart metformin. 4. Hypertension:  Continues on HCTZ 25 mg and losartan 100 mg daily. 5. Class III obesity (BMI 53.0) complicates medical and surgical management. Advance Directive: Full Code  DVT prophylaxis with enoxaparin 40 mg sub-Q daily.    Discharge planning: per orthopaedic surgery         Verito Dave MD  [facility] Hospitalist

## 2020-09-26 NOTE — PROGRESS NOTES
Occupational Therapy  Facility/Department: [facility] C5 - MED SURG/ORTHO  Daily Treatment Note  NAME: Evert Santos  : 1950  MRN: [id number]    Date of Service: 2020    Discharge Recommendations:  Home with Home health OT, 24 hour supervision or assist, S Level 1  OT Equipment Recommendations  Equipment Needed: Yes  Other: Heavy Duty/ Extra wide shower chair with arm support     HOME HEALTH CARE: LEVEL 3 SAFETY     - Initial home health evaluation to occur within 24-48 hours, in patient home   - Therapy evaluations in home within 24-48 hours of discharge; including DME and home safety   - Frontload therapy 5 days, then 3x a week   - Therapy to evaluate if patient has [address] needs for personal care   -  evaluation within 24-48 hours, includes evaluation of resources and insurance to determine [place], [place], LTC, and Medicaid options     Assessment   Performance deficits / Impairments: Decreased functional mobility ; Decreased ADL status; Decreased sensation;Decreased endurance;Decreased balance;Decreased high-level IADLs  Assessment: Patient progressing with POC this date. Patient able to ambulate from bed to bathroom with CGA and increased time and effort. Patient continues to demonstrate decreased sensation and motor control on LLE. Patient required high level of assistance for LB ADLs, but states that family will be available to assist. Anticipate patient will make good progress and be safe to discharge home with 24hr A and HHOT. OT Education: Plan of Care;OT Role;Precautions; ADL Adaptive Strategies;Transfer Training;Orientation;Equipment  Patient Education: disease specific education s/p NILAY  REQUIRES OT FOLLOW UP: Yes  Activity Tolerance  Activity Tolerance: Patient Tolerated treatment well  Activity Tolerance: Patient continues to demonstrate impaired motor control due to numbness from nerve block.  Improved control noted this date  Safety Devices  Safety Devices in place: Yes  Type of devices: All fall risk precautions in place; Left in chair;Call light within reach; Chair alarm in place;Nurse notified         Patient Diagnosis(es): The primary encounter diagnosis was History of total left hip replacement. A diagnosis of Primary osteoarthritis of left hip was also pertinent to this visit. has a past medical history of Atrial fibrillation ([facility] Diabetes mellitus ([facility] [address] Hyperlipidemia, Hypertension, FADI (obstructive sleep apnea), and PONV (postoperative nausea and vomiting). has a past surgical history that includes Tubal ligation; Hysterectomy; joint replacement (Bilateral); Rotator cuff repair (Bilateral); other surgical history (11/12/2014); Finger trigger release (Right, 10/04/2017); eye surgery (Bilateral); Tonsillectomy; Colonoscopy; and Total hip arthroplasty (Left, 9/24/2020). Restrictions  Restrictions/Precautions  Restrictions/Precautions: Weight Bearing, ROM Restrictions  Lower Extremity Weight Bearing Restrictions  Left Lower Extremity Weight Bearing: Partial Weight Bearing  Partial Weight Bearing Percentage Or Pounds: 50% WB  Position Activity Restriction  Hip Precautions: No ADduction, No hip external rotation  Subjective   General  Chart Reviewed: Yes, Progress Notes, History and Physical, Imaging, Operative Notes, Orders  Patient assessed for rehabilitation services?: Yes  Family / Caregiver Present: No  Diagnosis: Left NILAY  Subjective  Subjective: Patient pleasant, stated that her leg is feeling less numb. Pain Assessment  Pain Assessment: 0-10  Pain Level: 6  Pre Treatment Pain Screening  Intervention List: Nurse/Physician notified;Nurse called to administer meds; Patient able to continue with treatment  Vital Signs  Patient Currently in Pain: Yes        Objective    ADL  LE Dressing: Maximum assistance(donning briefs and shorts)  Toileting: Contact guard assistance(patient able to complete hygiene and manage LB dressing)        Balance  Sitting Balance: Stand by assistance  Standing Balance: Contact guard assistance  Standing Balance  Time: 4-5 mins  Activity: functional mobility from chair to bathroom with SW  Comment: decreased step length due to ongoing numbness with LLE during mobility  Functional Mobility  Functional - Mobility Device: Standard Walker  Activity: To/from bathroom  Assist Level: Contact guard assistance  Functional Mobility Comments: increased time and effort to move LLE  Toilet Transfers  Toilet - Technique: Ambulating  Equipment Used: Standard toilet  Toilet Transfer: Contact guard assistance  Toilet Transfers Comments: + grab bar  Bed mobility  Comment: pt up in chair at start and end of session  Transfers  Sit to stand: Contact guard assistance  Stand to sit: Contact guard assistance                       Cognition  Overall Cognitive Status: [address]  Times per week: 4-6x  Times per day: Daily  Current Treatment Recommendations: Strengthening, Patient/Caregiver Education & Training, Balance Training, Functional Mobility Training, Endurance Training, Safety Education & Training, Self-Care / ADL, Home Management Training                        AM-PAC Score        AM-PAC Inpatient Daily Activity Raw Score: 18 (09/26/20 1040)  AM-PAC Inpatient ADL T-Scale Score : 38.66 (09/26/20 1040)  ADL Inpatient CMS 0-100% Score: 46.65 (09/26/20 1040)  ADL Inpatient CMS G-Code Modifier : CK (09/26/20 1040)    Goals  Short term goals  Time Frame for Short term goals: by 9/27/20  Short term goal 1: Complete LB dressing with min A and use of AE as needed- Max A as of 9/26  Short term goal 2: Complete toilet transfers with supervision- CGA as of 9/26  Short term goal 3: Complete toileting with supervision- CGA as of 9/26  Short term goal 4: Verbalize understanding of safe car transfers via handout by 9/26/20- GOAL MET as of 9/26 via handout  Patient Goals   Patient goals :  \"To be able to use the bathroom independently\" Therapy Time   Individual Concurrent Group Co-treatment   Time In 0729         Time Out 0827         Minutes 58         Timed Code Treatment Minutes: [address], OTR/L

## 2020-09-26 NOTE — PROGRESS NOTES
Physical Therapy  Facility/Department: [facility] C5 - MED SURG/ORTHO  Daily Treatment Note  NAME: Luz Elena Nickerson  : 1950  MRN: [id number]    Date of Service: 2020    Discharge Recommendations:  24 hour supervision or assist, Home with Home health PT   PT Equipment Recommendations  Equipment Needed: Yes  Mobility Devices: AppCast Genie: Standard  If pt is unable to be seen after this session, please let this note serve as discharge summary. Please see case management note for discharge disposition. Thank you. Assessment   Body structures, Functions, Activity limitations: Decreased functional mobility ; Decreased strength; Increased pain;Decreased ROM; Decreased endurance;Decreased ADL status  Assessment: 66 yo female POD 2 after L THR anterolateral approach with positioning precautions and 50% WB. Patient is making good progress with her therapy goals. Today the patient was able ambulate 25 feet x 2 with SW and CGA and also ascend 1 curb step with SW and minimum assistance. Patient is safe for home, when medically stable, with continued 24/7 assistance and continued use of a standard walker. Patient will benefit from skilled PT in hospital and home PT upon discharge to address post op deficits. Patient will need SW for home. Treatment Diagnosis: LLE weakness, decreased mobility post L THR  Specific instructions for Next Treatment: Ex, bed mob, transfers, gait  Prognosis: Good  Decision Making: Low Complexity  PT Education: Goals; General Safety;Gait Training;PT Role;Disease Specific Education;Home Exercise Program;Transfer Training;Weight-bearing Education;Precautions;Plan of Care; Functional Mobility Training;Equipment; Injury Prevention  Patient Education: Disease Specific: Pt instructed in 50% PWB, anterolateral hip precautions, hospital safety guidelines.  Pt voices understanding  Barriers to Learning: none  REQUIRES PT FOLLOW UP: Yes  Activity Tolerance  Activity Tolerance: Patient Tolerated treatment well  Activity Tolerance: Vitals: supine 120/77 99 bpm. Seated 142/71 114 BPM. Post ambulation 133/66 130 BPM. Post curb step climbin/55. Patient Diagnosis(es): The primary encounter diagnosis was History of total left hip replacement. A diagnosis of Primary osteoarthritis of left hip was also pertinent to this visit. has a past medical history of Atrial fibrillation ([facility] [address] Diabetes mellitus ([facility] [address] Hyperlipidemia, Hypertension, FADI (obstructive sleep apnea), and PONV (postoperative nausea and vomiting). has a past surgical history that includes Tubal ligation; Hysterectomy; joint replacement (Bilateral); Rotator cuff repair (Bilateral); other surgical history (2014); Finger trigger release (Right, 10/04/2017); eye surgery (Bilateral); Tonsillectomy; Colonoscopy; and Total hip arthroplasty (Left, 2020). Restrictions  Restrictions/Precautions  Restrictions/Precautions: Weight Bearing, ROM Restrictions  Lower Extremity Weight Bearing Restrictions  Left Lower Extremity Weight Bearing: Partial Weight Bearing  Partial Weight Bearing Percentage Or Pounds: 50% WB  Position Activity Restriction  Hip Precautions: No ADduction, No hip external rotation  Subjective   General  Chart Reviewed: Yes  Family / Caregiver Present: No  Subjective  Subjective: Patient agreed to participate.   General Comment  Comments: RN cleared pt for therapy, pt resting in bed on approach  Pain Screening  Patient Currently in Pain: Yes  Pain Assessment  Pain Assessment: 0-10  Pain Level: 7  Pain Location: Hip  Pain Orientation: Left  Functional Pain Assessment: Prevents or interferes some active activities and ADLs  Vital Signs  Patient Currently in Pain: Yes       Orientation  Orientation  Overall Orientation Status: Within Normal Limits  Cognition   Cognition  Overall Cognitive Status: WFL  Objective   Bed mobility  Supine to Sit: Moderate assistance  Sit to Supine: Unable to assess(patient seated at edge of bed at end of session)  Scooting: Stand by assistance(to edge of bed)  Transfers  Sit to Stand: Contact guard assistance  Stand to sit: Contact guard assistance  Bed to Chair: Contact guard assistance  Comment: with SW. cueing for hand placement, SW placement and to maintain hip precautions. Ambulation  Ambulation?: Yes  Ambulation 1  Device: Standard Walker  Assistance: Contact guard assistance  Quality of Gait: antalgic non-reciprocal step pattern. decreased step length. significantly decreased left step height. cueing to maintain base of support within SW. cueing for safe hand placement during transfers. Gait Deviations: Slow Wilma;Decreased step length;Decreased step height  Distance: 25 feet x 2. 20 feet. Comments: No complaints of shortness of breath, chest pain or dizziness. Stairs/Curb  Stairs?: Yes  Stairs  Curbs: 6\"  Device: Standard walker  Assistance: Minimal assistance  Comment: min assist for placement of standard walker only. patient able to lift both her LLE and RLE without physical assistance from therapist. no loss of balance. cueing for sequencing of SW and lower extremities.      Balance  Posture: Fair  Sitting - Static: Good  Sitting - Dynamic: Good  Standing - Static: Fair  Standing - Dynamic: Fair  Comments: with SW  Exercises  Knee Long Arc Quad: 1 x 10 bilateral  Ankle Pumps: 1 x 10 bilateral  Other exercises  Other exercises?: No                        AM-PAC Score  AM-PAC Inpatient Mobility Raw Score : 17 (09/26/20 1746)  AM-PAC Inpatient T-Scale Score : 42.13 (09/26/20 1746)  Mobility Inpatient CMS 0-100% Score: 50.57 (09/26/20 1746)  Mobility Inpatient CMS G-Code Modifier : CK (09/26/20 1746)  AM-PAC Inpatient Mobility without Stair Climbing Raw Score : 14 (09/26/20 1746)  AM-PAC Inpatient without Stair Climbing T-Scale Score : 40.85 (09/26/20 1746)  Mobility Inpatient CMS 0-100% Score: 53.33 (09/26/20 1746)  Mobility Inpatient without Stair CMS G-Code Modifier : CK (09/26/20 1746) Goals  Short term goals  Time Frame for Short term goals: 2-3 days (9/27)  Short term goal 1: pt to perform bed mob with CG 9/26 not met. mod assist for bed mobility. Short term goal 2: pt to perform transfers SBA 9/26 progressing. CGA for transfers. Short term goal 3: pt to amb with  ft with CG 9/26 progressing ambulated 25 feet x 2 with SW and CGA  Short term goal 4: pt to participate in LE Ex 10-15 reps- MET 2nd session on 9/25  Short term goal 5: pt to voice understanding of HEP, 50% WB, hip positioning precautions- MET  Patient Goals   Patient goals : \"to know my exercises\"- MET    Plan    Plan  Times per week: BID 7 days wk  Times per day: Twice a day  Specific instructions for Next Treatment: Ex, bed mob, transfers, gait  Current Treatment Recommendations: Strengthening, Home Exercise Program, Endurance Training, Functional Mobility Training, Transfer Training, Gait Training, Safety Education & Training  Safety Devices  Type of devices: All fall risk precautions in place, Call light within reach, Nurse notified, Gait belt, Patient at risk for falls, Left in bed, Bed alarm in place(patient eating dinner at edge of bed at end of session.  patient's  at bedside at end of session.)     Therapy Time   Individual Concurrent Group Co-treatment   Time In 1651         Time Out 1730         Minutes 39         Timed Code Treatment Minutes: [address], PT

## 2020-09-26 NOTE — OP NOTE
[address] [address]                                OPERATIVE REPORT    PATIENT NAME: Malka Bence                     :        1950  MED REC NO:   [id number]                          ROOM:       2977  ACCOUNT NO:   [de-identified]                           ADMIT DATE: 2020  PROVIDER:     Trang Leiva MD    DATE OF PROCEDURE:  2020    SERVICE:  Orthopedic Surgery. SURGEON:  Steve Corbin MD    FIRST ASSISTANT:  NOBLE Dunham    PREOPERATIVE DIAGNOSES:  1. Severe osteoarthritis of the left hip (715.35). 2.  Morbid obesity. The patient has a BMI of 41.49. POSTOPERATIVE DIAGNOSIS:  Severe osteoarthritis of the left hip  (715.35). OPERATIVE PROCEDURE:  1. Left Smith and Nephew total hip replacement using a 50 mm outer  diameter R3 cup with one 20 and one 25 mm screw, 20-degree 32 mm offset  liner, size 12 standard Synergy femoral stem with a 32 +0 Oxinium head. 2.  Modifier 22. The patient has a BMI of 41.49, which made the  operation 40% more difficult in multitude different ways. This included  not just initial transferring and positioning of the patient, but also  exposure of the hip. It also made the placement of the trial and  permanent components more difficult as well as the closure and  postoperative transfer. The ultimate result was not compromised, but  the operation was more difficult because of the patient's obesity. ANESTHESIA:    TOURNIQUET:  Not applicable. DRAINS:  None. ESTIMATED BLOOD LOSS:  200 mL. COMPLICATIONS:  None. POSITION:  Lateral decubitus position in contralateral hip from surgery  with the Vac-Delon, superficial bony prominences carefully padded,  axillary roll. X-RAYS:  None. ANTIBIOTICS:  Per SCIP protocol based upon patient's age, weight, and  allergies.     SPECIAL PROCEDURES:  The patient had Cell Saver and platelet gel  utilized throughout the procedure. Platelet gel was utilized on the  uncemented placement of the components as well as on the layered  closure. DISPOSITION:  To the recovery room. INDICATIONS:  She is 65years old. The patient's history is well  documented in the records from [facility]. The patient has  failed nonoperative measures with respect to the osteoarthritic hip  regarding patient's pain and functional capabilities. There has been a lengthy discussion with the patient regarding the  risks, benefits, and potential complications of the operation as well as  a normal rehabilitative protocol. The patient specifically voiced  understanding to concerns regarding surgical infection, deep vein  thrombosis, dystrophy, arthrofibrosis, delayed rehabilitation,  dislocation of the prosthesis, periprosthetic fracture, neurologic  injury, etc.  We also talked about what would happen if the patient were  to undergo a hip dislocation and potential treatment in that regard. The patient voiced understanding to all of these concerns and elected to  have the procedure done. All questions were answered. DETAILS OF SURGERY:  The patient was seen in the preanesthesia holding  area by me, and I personally initialed the operative site. Any further  questions were also answered. The patient was then taken to the  operating room where anesthesia was induced and maintained by anesthesia  personnel. This was all documented in their records from [facility]. After the patient was positioned as described above, the hip was prepped  and draped with ChloraPrep. A longitudinal incision was made through the skin and carried down to  expose the tensor fascia mitzi and proximal iliotibial band. Longitudinal incision was made through these tissues. The Charnley  retractor was placed and the abductor mechanism was easily isolated.    Any redundant greater trochanter bursa was removed to specifically  delineate the anatomy of the abductor mechanism. A bayonet release was  performed of the abductors, sparing the tendinous portion posteriorly. The abductor musculature was then reflected off the anterior capsule of  the hip joint. Any bleeding encountered was controlled with a Bovie. With the hip capsule identified, a capsulotomy was performed and carried  up onto the edge of the acetabulum. A preliminary labral removal was  performed and, of course, dissection was carried along the medial side  of the femoral neck as well. The hip was then dislocated without difficulty and the preliminary cut  was made in the femoral neck. At this point, the acetabulum was addressed. Hohmann retractors were  placed circumferentially around the acetabulum and a wing retractor was  placed proximally. The acetabular labrum was removed in its entirety. The fovea centralis was then cleaned. Any bleeding encountered was  controlled once again with the Bovie. A soft tissue release was  performed inferiorly due to the tight capsular structures. Once the acetabulum was circumferentially isolated, a serial reaming was  performed to 1-mm smaller than the aforementioned size. This gave an  excellent fit circumferentially and the trial component was placed. This was positioned in approximately 20 degrees of anteversion and 45  degrees of abduction. I was very happy with the overall bleeding bed. The platelet gel was then placed into this bleeding bed and the  acetabular component was placed. Excellent fit was obtained. The liner  was then locked into position protecting against superior and posterior  dislocation. This was all carefully checked with the tonsil. Attention was then drawn toward the femur once again. The cookie cutter  was utilized to appropriately lateralize the opening. The awls were  used and then the broach was taken of the patient up to the  aforementioned size.   A trial reduction was performed and gave excellent  leg length and leg stability. The patient did not demonstrate any signs  of dislocation with 90 degrees of abduction and 50 degrees of internal  and external rotation as well as full extension and 50 degrees of  internal and external rotation. The permanent femoral component was then placed and gave the same  excellent alignment and stability. Again, platelet gel was utilized on  the prepped surface for the component placement. Everything else was assessed once again after the hip was reduced. There were no signs of impingement. Any redundant osteophyte tissue had  been removed at this point. Overall tissue tension was appropriate. The wound was then closed in layered fashion with No. 2 Ethibond in the  capsule and abductor mechanism. Each layer was sprayed with platelet  gel. The patient's wound was closed completely and the patient was  placed on abduction pillow postoperatively with the leg length and leg  position appearing to be appropriate. The patient was then transported  to the recovery room uneventfully.         Halley Armando MD    D: 09/25/2020 8:07:46       T: 09/25/2020 15:15:19     [email or web address]  Job#: [id number]     Doc#: [id number]

## 2020-09-26 NOTE — PROGRESS NOTES
Pt assessment completed and charted. VSS. Pt a/o. PRN medication administered per MAR. Sx dressing C/D/I. Bed in lowest position and wheels locked. Call light within reach. Bedside table within reach. Non-skid footwear in place. Pt denies any other needs at this time. Pt calls out appropriately. Will continue to monitor.

## 2020-09-28 ENCOUNTER — TELEPHONE (OUTPATIENT)
Dept: ORTHOPEDIC SURGERY | Age: 70
End: 2020-09-28

## 2020-09-28 NOTE — TELEPHONE ENCOUNTER
Spoke with pt, doing well. Better than yesterday. Incision status: No drainage or redness    Edema/Swelling/Teds: Nothing too significant. Pain level and status: Managing well. Use of pain medications: Using 1 tab every 4 hours. Blood thinner: Coumadin- no issues. Bowels: 2 BM since surgery. Home Care Agency active: [facility] active- RN and PT.      Outpatient therapy: No    Do you have all of your medications: Yes    Changes in medications: no    Ortho Vitals: NA    Follow up appointments:    Future Appointments   Date Time Provider Olga Caba   10/14/2020 12:45 PM NOBLE Avalos AND HOWARD   11/10/2020  8:00 AM MD Oralia Ortega

## 2020-10-07 ENCOUNTER — TELEPHONE (OUTPATIENT)
Dept: ORTHOPEDIC SURGERY | Age: 70
End: 2020-10-07

## 2020-10-07 NOTE — TELEPHONE ENCOUNTER
Attempted to contact pt, unable to leave a voicemail with purpose and call back number.     Swenson Andrea  Orthopedic Nurse Navigator  Phone number: [phone]    Follow up appointments:    Future Appointments   Date Time Provider Olga Caba   10/14/2020 12:45 PM Franchesca Schwab   11/10/2020  8:00 AM MD Loni Rosales

## 2020-10-14 ENCOUNTER — OFFICE VISIT (OUTPATIENT)
Dept: ORTHOPEDIC SURGERY | Age: 70
End: 2020-10-14

## 2020-10-14 VITALS — BODY MASS INDEX: 42.1 KG/M2 | WEIGHT: 223 LBS | HEIGHT: 61 IN

## 2020-10-14 PROCEDURE — 99024 POSTOP FOLLOW-UP VISIT: CPT | Performed by: PHYSICIAN ASSISTANT

## 2020-10-14 NOTE — PROGRESS NOTES
Chief Complaint   Patient presents with    Post-Op Check     PO LEFT THR SX:9/24/2020     History of present illness: The patient returns today after left total hip replacement performed on 9/24/2020. She is no longer taking any narcotic pain medication. She states that the hip itself is actually feeling very good. Unfortunately, she has been limited by a superficial ulcer at her coccyx. She states this really limits her activity and causes her a lot of pain. She is diabetic. Physical examination: The incision is clean, dry, and intact with no drainage or signs of infection. There is expected swelling with no evidence of DVT and a negative Homans sign. Neurovascular exam is intact. Leg length is appropriate. She does have a superficial ulcer at her coccyx. It is very difficult for her to relieve this pressure given her body habitus and difficulty with lying on her side. Radiographs: 2 view X-rays taken today including AP pelvis and lateral views of the left hip show excellent alignment of the prosthesis. No evidence of septic or aseptic loosening or periprosthetic fractures. Assessment/plan: We would like to begin outpatient physical therapy to restore range of motion and strength. The patient was given local wound care instructions. We discussed antibiotic prophylaxis prior to dental procedures for 1 year postoperatively. They will followup in 3-4 weeks for reevaluation. Given her history of diabetes and body habitus, I have referred her to the wound care clinic for her ulcer. With regards to the hip, she is doing extremely well      [facility]    This dictation was performed with a verbal recognition program (DRAGON) and it was checked for errors. It is possible that there are still dictated errors within this office note. If so, please bring any errors to my attention for an addendum. All efforts were made to ensure that this office note is accurate.

## 2020-10-20 ENCOUNTER — HOSPITAL ENCOUNTER (OUTPATIENT)
Dept: PHYSICAL THERAPY | Age: 70
Setting detail: THERAPIES SERIES
Discharge: HOME | End: 2020-10-20
Payer: MEDICARE

## 2020-10-20 PROCEDURE — 97110 THERAPEUTIC EXERCISES: CPT

## 2020-10-20 PROCEDURE — 97140 MANUAL THERAPY 1/> REGIONS: CPT

## 2020-10-20 PROCEDURE — 97161 PT EVAL LOW COMPLEX 20 MIN: CPT

## 2020-10-20 NOTE — FLOWSHEET NOTE
[place] [address] [facility], [address]  Phone: [phone]  Fax [phone]    Physical Therapy Treatment Note/ Progress Report:           Date:  10/20/2020    Patient Name:  Jorge Rivera    :  1950  MRN: [id number]  Restrictions/Precautions:  R & L TKR ( & ), 2 RTC SX, Lateral Left THR Precautions, Coccyx Ulcer, DM, HTN, HLD, Obesity, Atrial Fib, FADI.   Medical/Treatment Diagnosis Information:  Diagnosis: Z96.642 S/P Left Lateral THR (DOS 2020)  Treatment Diagnosis: M25.552 Left Hip Pain, M25.652 Left Hip Stiffness, M62.81 Core & Left LE Weakness, R26.2 Difficulty walking, L89.159 Coccyx Ulcer  Insurance/Certification information:  PT Insurance Information: [employer]   ($40CP, 100%, BMN, no auth)  Physician Information:  Referring Practitioner: Dr. Ashley Sanders  Has the plan of care been signed (Y/N):        []  Yes  [x]  No     Date of Patient follow up with Physician:     Is this a Progress Report:     []  Yes  [x]  No      If Yes:  Date Range for reporting period:  Beginning: 10-  Ending:    Progress report will be due (10 Rx or 30 days whichever is less):      Recertification will be due (POC Duration  / 90 days whichever is less): 2021     Visit # Insurance Allowable Auth Required   In-person 1 BMN []  Yes [x]  No    Telehealth   []  Yes []  No    Total        Functional Scale: LEFS= 76 %   Date assessed:10-      Therapy Diagnosis/Practice Pattern: 4 H     Number of Comorbidities:  []0     []1-2    [x]3+    Latex Allergy:  [x]NO      []YES  Preferred Language for Healthcare:   [x]English       []other:      Pain level:   5/10  Left hip  (Lat Approach) and L>R Knees     SUBJECTIVE:  See eval    OBJECTIVE: See eval  3.5 weeks post-op (lateral 2020)  (8 weeks post op Ortho vitals: )   Observation:    Test measurements:      RESTRICTIONS/PRECAUTIONS: Lateral THR Precautions. H/O Bilat TKR,  Coccyx open ulcer being RX'd by Dr. Selina Alejandre. Pt has DM. Exercises/Interventions:     Therapeutic Ex (96309) Sets/sec Reps Notes/CUES   Quad Set H10x10  hep   Seated Calf Stretch w/ Stap H30x3  hep   Supine Heel slides H5x10  hep   HL TrA w/ Add ball Squeeze H10x10  hep   Gluteal Set H10x10  hep   Bridge H5x10  hep   Mini Posterior Squat @ counter x10  hep   Standing Hip Adducter stretch H15x3  hep                                       Manual Intervention (58261)      Supine: quad & hip flexor rolling & STM 5 min     SL w/ 2 pillows @ knees: IT Band and gluteal rolling followed by gentle hip flexor stretching 8 min                             NMR re-education (48676)   CUES NEEDED                                                         Therapeutic Activity (14658)                                                Therapeutic Exercise and NMR EXR  [x] (40736) Provided verbal/tactile cueing for activities related to strengthening, flexibility, endurance, ROM for improvements in LE, proximal hip, and core control with self care, mobility, lifting, ambulation.  [] (12742) Provided verbal/tactile cueing for activities related to improving balance, coordination, kinesthetic sense, posture, motor skill, proprioception  to assist with LE, proximal hip, and core control in self care, mobility, lifting, ambulation and eccentric single leg control.      NMR and Therapeutic Activities:    [x] (49495 or 36314) Provided verbal/tactile cueing for activities related to improving balance, coordination, kinesthetic sense, posture, motor skill, proprioception and motor activation to allow for proper function of core, proximal hip and LE with self care and ADLs  [] (26845) Gait Re-education- Provided training and instruction to the patient for proper LE, core and proximal hip recruitment and positioning and eccentric body weight control with ambulation re-education including up and down stairs     Home Exercise Program:    [x] (94669) Reviewed/Progressed HEP activities related to strengthening, flexibility, endurance, ROM of core, proximal hip and LE for functional self-care, mobility, lifting and ambulation/stair navigation   [] (56765)Reviewed/Progressed HEP activities related to improving balance, coordination, kinesthetic sense, posture, motor skill, proprioception of core, proximal hip and LE for self care, mobility, lifting, and ambulation/stair navigation      Manual Treatments:  PROM / STM / Oscillations-Mobs:  G-I, II, III, IV (PA's, Inf., Post.)  [x] (35631) Provided manual therapy to mobilize LE, proximal hip and/or LS spine soft tissue/joints for the purpose of modulating pain, promoting relaxation,  increasing ROM, reducing/eliminating soft tissue swelling/inflammation/restriction, improving soft tissue extensibility and allowing for proper ROM for normal function with self care, mobility, lifting and ambulation. Modalities:   Declined   [] GAME READY (VASO)- for significant edema, swelling, pain control. Charges:  Timed Code Treatment Minutes: 45   Total Treatment Minutes: 65     [x] EVAL (LOW) 67049   [] EVAL (MOD) 66809  [] EVAL (HIGH) 27237   [] RE-EVAL     [x] ZZ(18353) x  2   [] IONTO  [] NMR (61402) x     [] VASO  [x] Manual (59143) x1      [] Other:  [] TA x      [] Mech Traction (98242)  [] ES(attended) (13587)      [] ES (un) (32730):       GOALS:   Short Term Goals: To be achieved in: 2 weeks  1. Independent in HEP and progression per patient tolerance, in order to prevent re-injury. []? Progressing: []? Met: []? Not Met: []? Adjusted  2. Patient will have a decrease in pain to facilitate improvement in movement, function, and ADLs as indicated by Functional Deficits. []? Progressing: []? Met: []? Not Met: []? Adjusted     Long Term Goals: To be achieved in: 12 weeks  1. Disability index score of 40 % or less for the LEFS to assist with reaching prior level of function.    []? well.      PLAN: See eval  [] Continue per plan of care [] Alter current plan (see comments above)  [x] Plan of care initiated [] Hold pending MD visit [] Discharge      Electronically signed by:  Derek Palomares, PT  [id number]    Note: If patient does not return for scheduled/ recommended follow up visits, this note will serve as a discharge from care along with most recent update on progress.

## 2020-10-20 NOTE — PLAN OF CARE
progressive left hip and L>R knee pain for the past 3-4 years. H/O bilateral TKR. H/O Chronic LBP w/ intermittent Sciatica. Will be seeing wound care specialist for first time for superficial coccyx ulcer this week. Had home Health PT for 2 weeks. Relevant Medical History:  Pt currently being DL'J for coccyx  Open ulcer by Dr. Di Lyn. Left hip x-rya (10-):2 view X-rays taken today including AP pelvis and lateral views of the left hip show excellent alignment of the prosthesis. No evidence of septic or aseptic loosening or periprosthetic fractures. Functional Disability Index:LEFS =  76%    Height: 5' 1 \"  Weight: 223 lb  Pain Scale:  5/10  Easing factors: rest, walker  Provocative factors: transfers, bed mobility, standing, walking, stairs, squatting,    Type: [x]Constant   []Intermittent  []Radiating []Localized []other:     Numbness/Tingling: occasional into feet during sciatic episodes. Occupation/School: Retired  (25 yrs), swimming, rec walking. Living Status/Prior Level of Function:Lives w/ spouse in ranch home with one small entry step. Independent with ADLs and IADLs. OBJECTIVE:     ROM LEFT RIGHT   HIP Flex 90 deg    HIP Abd 28 deg    HIP Ext - 15 deg    HIP IR     HIP ER     Knee ext 0    Knee Flex 92    Ankle PF     Ankle DF     Ankle In     Ankle Ev     Strength  LEFT RIGHT   HIP Flexors 3- 4   HIP Abductors 3- 4   HIP Ext 3- 4   Hip ER 3- 4   Knee EXT (quad) 41.6 lb 555.0 lb   Knee Flex (HS)     Ankle DF     Ankle PF     Ankle Inv     Ankle EV          Circumference  Mid apex  7 cm prox             Reflexes/Sensation:    [x]Dermatomes/Myotomes intact    [x]Reflexes equal and normal bilaterally   []Other:    Joint mobility:    []Normal    [x]Hypo: knees & lumbar spine   []Hyper    Palpation:     Functional Mobility/Transfers: min assist lifting left LE to high mat    Posture: Very lax protruding abdomin. Increase lumbar lordosis.   High left IC.    Bandages/Dressings/Incisions:Healed w/ no sign of infection left lateral hip incision. Coccyx region not examined and will be addressed by wound care specialist this week. Gait: Standard walker. Pt has a tendency to rush gait. Orthopedic Special Tests: Post-op NT. [x] Patient history, allergies, meds reviewed. Medical chart reviewed. See intake form. Review Of Systems (ROS):  [x]Performed Review of systems (Integumentary, CardioPulmonary, Neurological) by intake and observation. Intake form has been scanned into medical record. Patient has been instructed to contact their primary care physician regarding ROS issues if not already being addressed at this time.       Co-morbidities/Complexities (which will affect course of rehabilitation):   []None           Arthritic conditions   []Rheumatoid arthritis (M05.9)  []Osteoarthritis (M19.91)   Cardiovascular conditions   [x]Hypertension (I10)  [x]Hyperlipidemia (E78.5)  [x]Arrial Fib  []Atherosclerosis (I70)   Musculoskeletal conditions   []Disc pathology   []Congenital spine pathologies   []Prior surgical intervention  []Osteoporosis (M81.8)  []Osteopenia (M85.8)   Endocrine conditions   []Hypothyroid (E03.9)  []Hyperthyroid Gastrointestinal conditions   []Constipation (B67.83)   Metabolic conditions   [x]Morbid obesity (E66.01)  [x]Diabetes type 1(E10.65) or 2 (E11.65)   []Neuropathy (G60.9)     Pulmonary conditions   []Asthma (J45)  []Coughing   [x] FADI (Obstructive Sleep Apnea)   Psychological Disorders  []Anxiety (F41.9)  []Depression (F32.9)   []Other:   []Other:          Barriers to/and or personal factors that will affect rehab potential:              []Age  []Sex              [x]Motivation/Lack of Motivation                        [x]Co-Morbidities              []Cognitive Function, education/learning barriers              []Environmental, home barriers              []profession/work barriers  []past PT/medical experience  []other:  Justification:     Falls Risk Assessment (30 days):   [x] Falls Risk assessed and no intervention required. [] Falls Risk assessed and Patient requires intervention due to being higher risk   TUG score (>12s at risk):   11 sec w/ walker  [] Falls education provided, including           ASSESSMENT:   Functional Impairments:     [x]Noted lumbar/proximal hip/LE joint hypomobility   [x]Decreased LE functional ROM   [x]Decreased core/proximal hip strength and neuromuscular control   [x]Decreased LE functional strength   []Reduced balance/proprioceptive control   []other:      Functional Activity Limitations (from functional questionnaire and intake)   [x]Reduced ability to tolerate prolonged functional positions   [x]Reduced ability or difficulty with changes of positions or transfers between positions   [x]Reduced ability to maintain good posture and demonstrate good body mechanics with sitting, bending, and lifting   [x]Reduced ability to sleep   [x] Reduced ability or tolerance with driving and/or computer work   [x]Reduced ability to perform lifting, carrying tasks   [x]Reduced ability to squat   [x]Reduced ability to forward bend   [x]Reduced ability to ambulate prolonged functional periods/distances/surfaces   [x]Reduced ability to ascend/descend stairs   [x]Reduced ability to run, hop, cut or jump   []other:    Participation Restrictions   [x]Reduced participation in self care activities   [x]Reduced participation in home management activities   []Reduced participation in work activities   [x]Reduced participation in social activities. [x]Reduced participation in sport/recreation activities. Classification :    [x]Signs/symptoms consistent with post-surgical status including decreased ROM, strength and function.    []Signs/symptoms consistent with joint sprain/strain   []Signs/symptoms consistent with patella-femoral syndrome   []Signs/symptoms consistent with knee OA/hip OA   []Signs/symptoms consistent with internal derangement of knee/Hip   []Signs/symptoms consistent with functional hip weakness/NMR control      []Signs/symptoms consistent with tendinitis/tendinosis    []signs/symptoms consistent with pathology which may benefit from Dry needling      []other:      Prognosis/Rehab Potential:      []Excellent   [x]Good    [x]Fair/ Knee pain   []Poor    Tolerance of evaluation/treatment:    []Excellent   [x]Good    []Fair   []Poor  Physical Therapy Evaluation Complexity Justification  [x] A history of present problem with:  [] no personal factors and/or comorbidities that impact the plan of care;  []1-2 personal factors and/or comorbidities that impact the plan of care  [x]3 personal factors and/or comorbidities that impact the plan of care  [x] An examination of body systems using standardized tests and measures addressing any of the following: body structures and functions (impairments), activity limitations, and/or participation restrictions;:  [x] a total of 1-2 or more elements   [] a total of 3 or more elements   [] a total of 4 or more elements   [x] A clinical presentation with:  [x] stable and/or uncomplicated characteristics   [] evolving clinical presentation with changing characteristics  [] unstable and unpredictable characteristics;   [x] Clinical decision making of [] low, [] moderate, [] high complexity using standardized patient assessment instrument and/or measurable assessment of functional outcome.     [x] EVAL (LOW) 69608 (typically 20 minutes face-to-face)  [] EVAL (MOD) 56056 (typically 30 minutes face-to-face)  [] EVAL (HIGH) 27286 (typically 45 minutes face-to-face)  [] RE-EVAL       PLAN:   Frequency/Duration:  2 days per week for 12 Weeks:  Interventions:  [x]  Therapeutic exercise including: strength training, ROM, for Lower extremity and core   [x]  NMR activation and proprioception for LE, Glutes and Core   [x]  Manual therapy as indicated for LE, Hip and spine to include: Dry Needling/IASTM, STM, PROM, Gr I-IV mobilizations, manipulation. [x] Modalities as needed that may include: thermal agents, E-stim, Biofeedback, US, iontophoresis as indicated  [x] Patient education on joint protection, postural re-education, activity modification, progression of HEP. HEP instruction: Patient given written HEP (see scanned forms). GOALS:  Patient stated goal: Walk community distances w/o AD and good gait pattern. [] Progressing: [] Met: [] Not Met: [] Adjusted    Therapist goals for Patient:   Short Term Goals: To be achieved in: 2 weeks  1. Independent in HEP and progression per patient tolerance, in order to prevent re-injury. [] Progressing: [] Met: [] Not Met: [] Adjusted  2. Patient will have a decrease in pain to facilitate improvement in movement, function, and ADLs as indicated by Functional Deficits. [] Progressing: [] Met: [] Not Met: [] Adjusted    Long Term Goals: To be achieved in: 12 weeks  1. Disability index score of  40% or less for the LEFS to assist with reaching prior level of function. [] Progressing: [] Met: [] Not Met: [] Adjusted  2. Patient will demonstrate increased AROM left hip to neutral extension to allow for proper joint functioning as indicated by patients Functional Deficits. [] Progressing: [] Met: [] Not Met: [] Adjusted  3. Patient will demonstrate an increase in Strength to good proximal hip strength and control, within 5lb HHD in LE to allow for proper functional mobility as indicated by patients Functional Deficits. [] Progressing: [] Met: [] Not Met: [] Adjusted  4. Patient will ascend and descend a flight of stairs using handrails without increased symptoms or restriction. [] Progressing: [] Met: [] Not Met: [] Adjusted  5. Walk community distances w/o AD and good gait pattern.  [] Progressing: [] Met: [] Not Met: [] Adjusted     Electronically signed by:  Monika Neff, [address] [facility]

## 2020-10-21 ENCOUNTER — TELEPHONE (OUTPATIENT)
Dept: ORTHOPEDIC SURGERY | Age: 70
End: 2020-10-21

## 2020-10-21 NOTE — TELEPHONE ENCOUNTER
Nurse Navigator called patient for one final follow up:  Issues with pressure wound on coccyx since surgery. Going to wound care tomorrow. Everything else is still going well. Pt has no questions or concerns. Signed off from pt. Instructed pt to call RN or [place] office with any issues or concerns.     Rivera Block  Orthopedic Nurse Navigator  Phone number: [phone]

## 2020-10-22 ENCOUNTER — HOSPITAL ENCOUNTER (OUTPATIENT)
Dept: PHYSICAL THERAPY | Age: 70
Setting detail: THERAPIES SERIES
Discharge: HOME | End: 2020-10-22
Payer: MEDICARE

## 2020-10-22 ENCOUNTER — HOSPITAL ENCOUNTER (OUTPATIENT)
Dept: WOUND CARE | Age: 70
Discharge: HOME | End: 2020-10-22
Payer: MEDICARE

## 2020-10-22 VITALS
WEIGHT: 224.4 LBS | BODY MASS INDEX: 41.3 KG/M2 | DIASTOLIC BLOOD PRESSURE: 72 MMHG | TEMPERATURE: 98.6 F | SYSTOLIC BLOOD PRESSURE: 114 MMHG | HEART RATE: 101 BPM | RESPIRATION RATE: 18 BRPM | HEIGHT: 62 IN

## 2020-10-22 PROCEDURE — 99203 OFFICE O/P NEW LOW 30 MIN: CPT | Performed by: INTERNAL MEDICINE

## 2020-10-22 PROCEDURE — 97140 MANUAL THERAPY 1/> REGIONS: CPT | Performed by: PHYSICAL THERAPIST

## 2020-10-22 PROCEDURE — 99213 OFFICE O/P EST LOW 20 MIN: CPT

## 2020-10-22 PROCEDURE — 97110 THERAPEUTIC EXERCISES: CPT | Performed by: PHYSICAL THERAPIST

## 2020-10-22 ASSESSMENT — PAIN SCALES - GENERAL: PAINLEVEL_OUTOF10: 0

## 2020-10-22 NOTE — FLOWSHEET NOTE
[facility], [address] [place] [address]  Phone: [phone]  Fax [phone]    Physical Therapy Treatment Note/ Progress Report:           Date:  10/22/2020    Patient Name:  Arelis Espinoza    :  1950  MRN: [id number]  Restrictions/Precautions:  R & L TKR ( & ), 2 RTC SX, Lateral Left THR Precautions, Coccyx Ulcer, DM, HTN, HLD, Obesity, Atrial Fib, FADI. Medical/Treatment Diagnosis Information:  Diagnosis: Z96.642 S/P Left Lateral THR (DOS 2020)  Treatment Diagnosis: M25.552 Left Hip Pain, M25.652 Left Hip Stiffness, M62.81 Core & Left LE Weakness, R26.2 Difficulty walking, L89.159 Coccyx Ulcer  Insurance/Certification information:  PT Insurance Information: [employer]   ($40CP, 100%, BMN, no auth)  Physician Information:  Referring Practitioner: Dr. Camden Aleman  Has the plan of care been signed (Y/N):        []  Yes  [x]  No     Date of Patient follow up with Physician:     Is this a Progress Report:     []  Yes  [x]  No      If Yes:  Date Range for reporting period:  Beginning: 10-  Ending:    Progress report will be due (10 Rx or 30 days whichever is less): 5398     Recertification will be due (POC Duration  / 90 days whichever is less): 2021     Visit # Insurance Allowable Auth Required   In-person 2 BMN []  Yes [x]  No    Telehealth   []  Yes []  No    Total        Functional Scale: LEFS= 76 %   Date assessed:10-      Therapy Diagnosis/Practice Pattern: 4 H     Number of Comorbidities:  []0     []1-2    [x]3+    Latex Allergy:  [x]NO      []YES  Preferred Language for Healthcare:   [x]English       []other:      Pain level:   5/10  Left hip  (Lat Approach) and L>R Knees     SUBJECTIVE:  Patient reports her hip is a little sore, but overall doing fairly well.  Saw wound care doctor who said it seems that her wound is starting to heal.     OBJECTIVE: See eval  3.5 weeks post-op (lateral 9/24/2020)  (8 weeks post op Ortho vitals: 11-19-20202)   Observation:    Test measurements:  NT this date    RESTRICTIONS/PRECAUTIONS: Lateral THR Precautions. H/O Bilat TKR,  Coccyx open ulcer being RX'd by Dr. Jeet Givens. Pt has DM. Exercises/Interventions:     Therapeutic Ex (99443) Sets/sec Reps Notes/CUES    hep    hep    hep   HL TrA w/ Add ball Squeeze H10x10  hep    hep   Bridge 15 x 5\"  Hep   Clamshells 3\"  15          Incline stretch 30\" 3    DHR 2 10    minisquats 3\" 15x                      Manual Intervention (60545)      Supine: quad & hip flexor rolling & STM 5 min     SL w/ 2 pillows @ knees: IT Band and gluteal rolling followed by gentle hip flexor stretching 8 min                             NMR re-education (73095)   CUES NEEDED   Tandem balance 10\" 5x                                                    Therapeutic Activity (94814)                                                Therapeutic Exercise and NMR EXR  [x] (93647) Provided verbal/tactile cueing for activities related to strengthening, flexibility, endurance, ROM for improvements in LE, proximal hip, and core control with self care, mobility, lifting, ambulation.  [] (22651) Provided verbal/tactile cueing for activities related to improving balance, coordination, kinesthetic sense, posture, motor skill, proprioception  to assist with LE, proximal hip, and core control in self care, mobility, lifting, ambulation and eccentric single leg control.      NMR and Therapeutic Activities:    [x] (38222 or 84569) Provided verbal/tactile cueing for activities related to improving balance, coordination, kinesthetic sense, posture, motor skill, proprioception and motor activation to allow for proper function of core, proximal hip and LE with self care and ADLs  [] (28969) Gait Re-education- Provided training and instruction to the patient for proper LE, core and proximal hip recruitment and positioning and eccentric body weight control with Discharge      Electronically signed by:  Chandler Dai, PT, DPT [id number]     Note: If patient does not return for scheduled/ recommended follow up visits, this note will serve as a discharge from care along with most recent update on progress.

## 2020-10-22 NOTE — PLAN OF CARE
Patient to [facility] for initial visit. Patient has painful area on coccyx that is not open. MD reviewed with patient some options to help relieve the discomfort of newly healed area. Patient provided with a few sample products to try. Patient instructed to purchase offloading cushion from outpatient pharmacy. Hydrocolloid applied to area today and discharge instructions reviewed. No follow up scheduled.

## 2020-10-25 PROBLEM — E66.813 CLASS 3 SEVERE OBESITY DUE TO EXCESS CALORIES WITH SERIOUS COMORBIDITY AND BODY MASS INDEX (BMI) OF 40.0 TO 44.9 IN ADULT (HCC): Status: ACTIVE | Noted: 2020-10-25

## 2020-10-25 PROBLEM — E66.01 CLASS 3 SEVERE OBESITY DUE TO EXCESS CALORIES WITH SERIOUS COMORBIDITY AND BODY MASS INDEX (BMI) OF 40.0 TO 44.9 IN ADULT (HCC): Status: ACTIVE | Noted: 2020-10-25

## 2020-10-25 PROBLEM — M70.22 OLECRANON BURSITIS, LEFT ELBOW: Status: RESOLVED | Noted: 2017-04-13 | Resolved: 2020-10-25

## 2020-10-25 PROBLEM — Z87.2 HISTORY OF PRESSURE ULCER: Status: ACTIVE | Noted: 2020-10-25

## 2020-10-25 NOTE — CONSULTS
[facility] Consult Note    Salbador Reich     : 1950    DATE OF VISIT:  10/22/2020    Subjective:     Salbador Reich is a 65 y.o. female who has a pressure ulcer located on the coccyx. Reinaldo Boyle, her PA from the orthopedics office, referred her here for an opinion on ongoing care. Current complaint of pain in this ulcer? yes. Quality of pain: aching and dull  Timing: constant  Severity: moderate  Associated Signs/Symptoms: redness  Other significant symptoms or pertinent ulcer history: Mrs. Pradeep Campos recently underwent a right total hip arthroplasty, and in the post-op period she noticed some pain at the coccyx, and was noted to have a very small pressure ulcer; from her description, I think a stage 2, but I don't have a photo from that time. She has tried to continue to reposition and participate in PT, but she continues to have a good deal of pain, mostly when seated but also when lying down in bed. She's managed the area topically with a bit of topical antibiotics or ZnO ointment, and the skin actually does look like it's delicately healed today, but is still quite uncomfortable. Additional ulcer(s) noted? no.      Ms. Pradeep Campos has a past medical history of Atrial fibrillation ([address] Diabetes mellitus ([address] Hyperlipidemia, Hypertension, Olecranon bursitis, left elbow, and FADI (obstructive sleep apnea). She has a past surgical history that includes Tubal ligation; Hysterectomy; joint replacement (Bilateral); Rotator cuff repair (Bilateral); other surgical history (2014); Finger trigger release (Right, 10/04/2017); eye surgery (Bilateral); Tonsillectomy; Colonoscopy; and Total hip arthroplasty (Left, 2020). Her family history includes Cancer in her father and mother. Ms. Pradeep Campos reports that she has never smoked. She has never used smokeless tobacco. She reports that she does not drink alcohol or use drugs.     Her current medication list consists of Probiotic Product, atorvastatin, flecainide, glipiZIDE, hydroCHLOROthiazide, losartan, metFORMIN, vitamin D-3, and warfarin. Allergies: Furosemide; Hydrocodone-acetaminophen; Sitagliptin; and Morphine    Pertinent items from the review of systems are discussed in the HPI; the remainder of the ROS was reviewed and is negative. Objective:     Vitals:    10/22/20 1228 10/22/20 1240   BP:  114/72   Pulse:  101   Resp: 18    Temp: 98.6 °F (37 °C)    TempSrc: Oral    Weight: 224 lb 6.4 oz (101.8 kg)    Height: 5' 1.75\" (1.568 m)        Constitutional:  well-developed, well-nourished, overweight, uncomfortable (shifting in the bed constantly), no acute distress otherwise  Psychiatric:  oriented to person, place and time; mood and affect appropriate for the situation   Eyes:  pupils equal, round and reactive to light; sclerae anicteric, conjunctivae not pale  Musculoskeletal:  no clubbing, cyanosis or petechiae; pelvic and lower back area with no gross effusions, joint misalignment or acute arthritis  Ruthy-ulcer skin: indurated, pink, erythematous  and warm. Ulcer(s): very delicately healed area of recent pressure at the coccyx, fragile hyperkeratosis, presumed healed stage 2 ulcer.  Photos also saved in electronic chart.  ______________________________    Lab Results   Component Value Date    LABALBU 4.0 09/10/2020     Lab Results   Component Value Date    CREATININE 0.8 09/25/2020     Lab Results   Component Value Date    HGB 11.7 (L) 09/26/2020     Lab Results   Component Value Date    LABA1C 6.2 09/25/2020     Assessment:     Patient Active Problem List   Diagnosis Code    Obesity, Class III, BMI 40-49.9 (morbid obesity) ([address] E66.01    Diabetes ([facility] E11.9    Essential hypertension I10    Paroxysmal atrial fibrillation (HCC) I48.0    Trigger finger, right middle finger M65.331    Osteoarthritis of finger of right hand M19.041    History of total left hip replacement Z96.642    History of pressure ulcer Z87.2       Assessment of today's active condition(s): recent total hip arthroplasty, period of decreased mobility, development of coccygeal pressure ulcer, now delicately healed, but still quite uncomfortable since that healed skin is so thin and fragile, and her mobility certainly isn't totally back to normal. No signs of infection, no real worry about deeper infection or necrosis with unintended epidermal coverage. Do need to provide a bit of ongoing skin care and offloading, to reduce her pain, encourage denser skin coverage, get her more durably healed. Factors contributing to occurrence and/or persistence of the chronic ulcer include diabetes, chronic pressure, decreased mobility, shear force and obesity. Medical necessity of today's visit is shown by the above documentation. Sharp debridement is not indicated today, based upon the exam findings in the wound(s) above. Discharge plan:     Treatment in the wound care center today, per RN documentation: skin prep and hydrocolloid. Reviewed the importance of the basics -- adequate protein intake, maintaining hydration, keeping glucoses under control, changing body positions as much as possible. For a topical dressing, showed her examples of a hydrocolloid and a silicone-bordered foam - the former is more cost-effective, probably provides more protection against friction, but adhesive might be a bit more aggressive, and it doesn't provide as much cushioning against direct pressure. We let her know where she can purchase some additional dressings if needed, after she tries a sample from us today; insurance unfortunately won't pay for these, with no open wound today. If a true dressing isn't working out for her, she can use a zinc oxide-based protective ointment or paste.      Also recommended an offloading cushion while seated -- best option is a gel or foam cushion that is a bit thicker in the back than the front (to encourage her to sit forward a bit), and with a \"U\" or \"V\" notch in the back, where her coccyx can float a bit. The [employer] [address] from us has a couple of varieties for sale. Home treatment: good handwashing before and after any dressing changes. Cleanse ulcer with saline or soap & water before dressing change. May use Vaseline (petrolatum), Aquaphor, Aveeno, CeraVe, Cetaphil, Eucerin, Lubriderm, etc for dry skin. Dressing type for home: either a hydrocolloid or a silicone bordered foam for a week or two, changing every day or two, as needed. Written discharge instructions given to patient. Follow up here PRN.     Electronically signed by Julianna Dhaliwal MD on 10/25/2020 at 1:00 PM.

## 2020-10-26 ENCOUNTER — HOSPITAL ENCOUNTER (OUTPATIENT)
Dept: PHYSICAL THERAPY | Age: 70
Setting detail: THERAPIES SERIES
Discharge: HOME | End: 2020-10-26
Payer: MEDICARE

## 2020-10-26 PROCEDURE — 97110 THERAPEUTIC EXERCISES: CPT | Performed by: PHYSICAL THERAPIST

## 2020-10-26 PROCEDURE — 97140 MANUAL THERAPY 1/> REGIONS: CPT | Performed by: PHYSICAL THERAPIST

## 2020-10-26 NOTE — FLOWSHEET NOTE
[facility], [address]  Phone: [phone]  Fax [phone]    Physical Therapy Treatment Note/ Progress Report:           Date:  10/26/2020    Patient Name:  Jose Ko    :  1950  MRN: [id number]  Restrictions/Precautions:  R & L TKR ( & ), 2 RTC SX, Lateral Left THR Precautions, Coccyx Ulcer, DM, HTN, HLD, Obesity, Atrial Fib, FADI. Medical/Treatment Diagnosis Information:  Diagnosis: Z96.642 S/P Left Lateral THR (DOS 2020)  Treatment Diagnosis: M25.552 Left Hip Pain, M25.652 Left Hip Stiffness, M62.81 Core & Left LE Weakness, R26.2 Difficulty walking, L89.159 Coccyx Ulcer  Insurance/Certification information:  PT Insurance Information: [employer]   ($40CP, 100%, BMN, no auth)  Physician Information:  Referring Practitioner: Dr. Patricia Zeng  Has the plan of care been signed (Y/N):        []  Yes  [x]  No     Date of Patient follow up with Physician:     Is this a Progress Report:     []  Yes  [x]  No      If Yes:  Date Range for reporting period:  Beginning: 10-  Ending:    Progress report will be due (10 Rx or 30 days whichever is less): 44-     Recertification will be due (POC Duration  / 90 days whichever is less): 2021     Visit # Insurance Allowable Auth Required   In-person 3 BMN []  Yes [x]  No    Telehealth   []  Yes []  No    Total        Functional Scale: LEFS= 76 %   Date assessed:10-      Therapy Diagnosis/Practice Pattern: 4 H     Number of Comorbidities:  []0     []1-2    [x]3+    Latex Allergy:  [x]NO      []YES  Preferred Language for Healthcare:   [x]English       []other:      Pain level:   3/10  Left hip (buttocks)   (Lat Approach) and L>R Knees     SUBJECTIVE: pt reports she feels like she should be further along then she is. She cannot do some of he HEP at home because her bed is too soft. She is now able to get up into her bed without the stool.  She is now able to get into the car on the 's side without a stool but he is not driving her self yet. Her buttocks wound is healing      OBJECTIVE:   4.5 weeks post-op (lateral 9/24/2020)  (8 weeks post op Ortho vitals: 11-19-20202)   Observation:    Test measurements:      RESTRICTIONS/PRECAUTIONS: Lateral THR Precautions. H/O Bilat TKR,  Coccyx open ulcer being RX'd by Dr. Fu Eth. Pt has DM. Exercises/Interventions:     Therapeutic Ex (75593) Sets/sec Reps Notes/CUES    hep    hep   Supine Heel slides with SB  H5x15  hep   HL TrA w/ Add ball Squeeze H10x10  hep    hep   Bridge with SB  5\" 2x10   Hep   Clamshells          Incline stretch 30\" 3    DHR 2 10    minisquats 3\" 2x10     Supine SAQ  5\" 2x10  2#     Sitting LAQ  MR x15            Manual Intervention (64183)      Supine: quad & hip flexor rolling & STM 7 min     SL w/ 2 pillows @ knees: IT Band and gluteal rolling followed by gentle hip flexor stretching 8 min                             NMR re-education (88098)   CUES NEEDED   Tandem balance 10\" 4xB\     Standing weight shifts on floor without UE asst  10x ea                                                Therapeutic Activity (65106)                                                Therapeutic Exercise and NMR EXR  [x] (15240) Provided verbal/tactile cueing for activities related to strengthening, flexibility, endurance, ROM for improvements in LE, proximal hip, and core control with self care, mobility, lifting, ambulation.  [] (94216) Provided verbal/tactile cueing for activities related to improving balance, coordination, kinesthetic sense, posture, motor skill, proprioception  to assist with LE, proximal hip, and core control in self care, mobility, lifting, ambulation and eccentric single leg control.      NMR and Therapeutic Activities:    [x] (27268 or 78560) Provided verbal/tactile cueing for activities related to improving balance, coordination, kinesthetic sense, posture, motor skill, []? Adjusted  2. Patient will have a decrease in pain to facilitate improvement in movement, function, and ADLs as indicated by Functional Deficits. []? Progressing: []? Met: []? Not Met: []? Adjusted     Long Term Goals: To be achieved in: 12 weeks  1. Disability index score of 40 % or less for the LEFS to assist with reaching prior level of function. []? Progressing: []? Met: []? Not Met: []? Adjusted  2. Patient will demonstrate increased AROM left hip to neutral extension to allow for proper joint functioning as indicated by patients Functional Deficits. []? Progressing: []? Met: []? Not Met: []? Adjusted  3. Patient will demonstrate an increase in Strength to good proximal hip strength and control, within 5lb HHD in LE to allow for proper functional mobility as indicated by patients Functional Deficits. []? Progressing: []? Met: []? Not Met: []? Adjusted  4. Patient will ascend and descend a flight of stairs using handrails without increased symptoms or restriction. []? Progressing: []? Met: []? Not Met: []? Adjusted  5. Walk community distances w/o AD and good gait pattern. []? Progressing: []? Met: []? Not Met: []? Adjusted     Overall Progression Towards Functional goals/ Treatment Progress Update:  [] Patient is progressing as expected towards functional goals listed. [] Progression is slowed due to complexities/Impairments listed. [] Progression has been slowed due to co-morbidities.   [x] Plan just implemented, too soon to assess goals progression <30days   [] Goals require adjustment due to lack of progress  [] Patient is not progressing as expected and requires additional follow up with physician  [] Other    Prognosis for POC: [x] Good [] Fair  [] Poor      Patient requires continued skilled intervention: [x] Yes  [] No    Treatment/Activity Tolerance:  [x] Patient able to complete treatment  [] Patient limited by fatigue  [] Patient limited by pain    [] Patient limited by other medical

## 2020-10-29 ENCOUNTER — HOSPITAL ENCOUNTER (OUTPATIENT)
Dept: PHYSICAL THERAPY | Age: 70
Setting detail: THERAPIES SERIES
Discharge: HOME | End: 2020-10-29
Payer: MEDICARE

## 2020-10-29 PROCEDURE — 97140 MANUAL THERAPY 1/> REGIONS: CPT

## 2020-10-29 PROCEDURE — 97112 NEUROMUSCULAR REEDUCATION: CPT

## 2020-10-29 PROCEDURE — 97110 THERAPEUTIC EXERCISES: CPT

## 2020-10-29 NOTE — FLOWSHEET NOTE
9/24/2020)  (8 weeks post op Ortho vitals: 11-19-20202)   Observation: Introduced standing CKC TE w/ good tolerance.  Test measurements:      RESTRICTIONS/PRECAUTIONS: Lateral THR Precautions. H/O Bilat TKR,  Coccyx open ulcer being RX'd by Dr. Shannan Bob. Pt has DM. Exercises/Interventions:     Therapeutic Ex (98266) Sets/sec Reps Notes/CUES   [place] 4 min     Standing Gastroc Stretch on Incline H30x4     Sidestepping over bolsters @ half wall Half way,  2 laps     Seated HS & Calf stretch Combo in Chairs H30x3 B     Mini Wall Slides w/ Add Ball Squeeze between 2 chairs [id number]     Standing HR @ Wall x10           Seated Hip Abd YTB H2 2x10     Seated SAQ 2# H3 2x10     Supine Heel slides with SB  H5x15  hep   HL TrA w/ Add ball Squeeze H10x10  hep   SB Bridge  5\" 2x10   Hep                                           Manual Intervention (99477)      Supine: quad & hip flexor rolling & STM 7 min     SL w/ 2 pillows @ knees: IT Band and gluteal rolling followed by gentle hip flexor stretching 8 min                             NMR re-education (83627)   CUES NEEDED   Tandem balance 10\" 4xB     10x ea      Airex WS EC @ Half Wall      Airex LSU                                    Therapeutic Activity (32022)                                                Therapeutic Exercise and NMR EXR  [x] (82764) Provided verbal/tactile cueing for activities related to strengthening, flexibility, endurance, ROM for improvements in LE, proximal hip, and core control with self care, mobility, lifting, ambulation.  [] (65768) Provided verbal/tactile cueing for activities related to improving balance, coordination, kinesthetic sense, posture, motor skill, proprioception  to assist with LE, proximal hip, and core control in self care, mobility, lifting, ambulation and eccentric single leg control.      NMR and Therapeutic Activities:    [x] (15391 or 46355) Provided verbal/tactile cueing for activities related to improving balance, coordination, kinesthetic sense, posture, motor skill, proprioception and motor activation to allow for proper function of core, proximal hip and LE with self care and ADLs  [] (84252) Gait Re-education- Provided training and instruction to the patient for proper LE, core and proximal hip recruitment and positioning and eccentric body weight control with ambulation re-education including up and down stairs     Home Exercise Program:    [x] (23077) Reviewed/Progressed HEP activities related to strengthening, flexibility, endurance, ROM of core, proximal hip and LE for functional self-care, mobility, lifting and ambulation/stair navigation   [] (95269)Reviewed/Progressed HEP activities related to improving balance, coordination, kinesthetic sense, posture, motor skill, proprioception of core, proximal hip and LE for self care, mobility, lifting, and ambulation/stair navigation      Manual Treatments:  PROM / STM / Oscillations-Mobs:  G-I, II, III, IV (PA's, Inf., Post.)  [x] (40191) Provided manual therapy to mobilize LE, proximal hip and/or LS spine soft tissue/joints for the purpose of modulating pain, promoting relaxation,  increasing ROM, reducing/eliminating soft tissue swelling/inflammation/restriction, improving soft tissue extensibility and allowing for proper ROM for normal function with self care, mobility, lifting and ambulation. Modalities:   Declined   [] GAME READY (VASO)- for significant edema, swelling, pain control. Charges:  Timed Code Treatment Minutes: 55   Total Treatment Minutes: 55     [] EVAL (LOW) 27341   [] EVAL (MOD) 94554  [] EVAL (HIGH) 47550   [] RE-EVAL     [x] MT(50293) x  2   [] IONTO  [x] NMR (49893) x 1   [] VASO  [x] Manual (31153) x1      [] Other:  [] TA x      [] Mech Traction (32836)  [] ES(attended) (90760)      [] ES (un) (22761):       GOALS:   Short Term Goals: To be achieved in: 2 weeks  1.  Independent in HEP and progression per patient tolerance, in order to prevent re-injury. [x]? Progressing: []? Met: []? Not Met: []? Adjusted  2. Patient will have a decrease in pain to facilitate improvement in movement, function, and ADLs as indicated by Functional Deficits. [x]? Progressing: []? Met: []? Not Met: []? Adjusted     Long Term Goals: To be achieved in: 12 weeks  1. Disability index score of 40 % or less for the LEFS to assist with reaching prior level of function. []? Progressing: []? Met: []? Not Met: []? Adjusted  2. Patient will demonstrate increased AROM left hip to neutral extension to allow for proper joint functioning as indicated by patients Functional Deficits. [x]? Progressing: []? Met: []? Not Met: []? Adjusted  3. Patient will demonstrate an increase in Strength to good proximal hip strength and control, within 5lb HHD in LE to allow for proper functional mobility as indicated by patients Functional Deficits. []? Progressing: []? Met: []? Not Met: []? Adjusted  4. Patient will ascend and descend a flight of stairs using handrails without increased symptoms or restriction. []? Progressing: []? Met: []? Not Met: []? Adjusted  5. Walk community distances w/o AD and good gait pattern. [x]? Progressing: []? Met: []? Not Met: []? Adjusted     Overall Progression Towards Functional goals/ Treatment Progress Update:  [x] Patient is progressing as expected towards functional goals listed. [] Progression is slowed due to complexities/Impairments listed. [] Progression has been slowed due to co-morbidities.   [] Plan just implemented, too soon to assess goals progression <30days   [] Goals require adjustment due to lack of progress  [] Patient is not progressing as expected and requires additional follow up with physician  [] Other    Prognosis for POC: [x] Good [] Fair  [] Poor      Patient requires continued skilled intervention: [x] Yes  [] No    Treatment/Activity Tolerance:  [x] Patient able to complete treatment  [] Patient limited by fatigue  []

## 2020-11-03 ENCOUNTER — HOSPITAL ENCOUNTER (OUTPATIENT)
Dept: PHYSICAL THERAPY | Age: 70
Setting detail: THERAPIES SERIES
Discharge: HOME | End: 2020-11-03
Payer: MEDICARE

## 2020-11-03 PROCEDURE — 97112 NEUROMUSCULAR REEDUCATION: CPT

## 2020-11-03 PROCEDURE — 97110 THERAPEUTIC EXERCISES: CPT

## 2020-11-03 PROCEDURE — 97140 MANUAL THERAPY 1/> REGIONS: CPT

## 2020-11-03 NOTE — FLOWSHEET NOTE
[facility], [address]  Phone: [phone]  Fax [phone]    Physical Therapy Treatment Note/ Progress Report:           Date:  11/3/2020    Patient Name:  Janet Garnica    :  1950  MRN: [id number]  Restrictions/Precautions:  R & L TKR ( & ), 2 RTC SX, Lateral Left THR Precautions, Coccyx Ulcer, DM, HTN, HLD, Obesity, Atrial Fib, FADI. Medical/Treatment Diagnosis Information:  Diagnosis: Z96.642 S/P Left Lateral THR (DOS 2020)  Treatment Diagnosis: M25.552 Left Hip Pain, M25.652 Left Hip Stiffness, M62.81 Core & Left LE Weakness, R26.2 Difficulty walking, L89.159 Coccyx Ulcer  Insurance/Certification information:  PT Insurance Information: [employer]   ($40CP, 100%, BMN, no auth)  Physician Information:  Referring Practitioner: Dr. Aide Thompson  Has the plan of care been signed (Y/N):        []  Yes  [x]  No     Date of Patient follow up with Physician:     Is this a Progress Report:     []  Yes  [x]  No      If Yes:  Date Range for reporting period:  Beginning: 10-  Ending:    Progress report will be due (10 Rx or 30 days whichever is less): 50-     Recertification will be due (POC Duration  / 90 days whichever is less): 2021     Visit # Insurance Allowable Auth Required   In-person 5 BMN []  Yes [x]  No    Telehealth   []  Yes []  No    Total        Functional Scale: LEFS= 76 %   Date assessed:10-      Therapy Diagnosis/Practice Pattern: 4 H     Number of Comorbidities:  []0     []1-2    [x]3+    Latex Allergy:  [x]NO      []YES  Preferred Language for Healthcare:   [x]English       []other:      Pain level:   2/10  Left hip (buttocks)   (Lat Approach) and L>R Knees     SUBJECTIVE:Having more left knee pain then anything. Not sore after LV.     OBJECTIVE:   5.5 weeks post-op (lateral 2020)  (8 weeks post op Ortho vitals: )   Observation: GT progression to [facility] today.  After training patient given okay to walk with SC in her home, but advised to continue to use walker in community until NV.  Test measurements:      RESTRICTIONS/PRECAUTIONS: Lateral THR Precautions. H/O Bilat TKR,  Coccyx open ulcer being RX'd by Dr. Jorgito Jennings. Pt has DM.     Exercises/Interventions:     Therapeutic Ex (16038) Sets/sec Reps Notes/CUES   [place] 4 min     Standing Gastroc Stretch on Incline H30x4     Sidestepping over bolsters @ half wall Half way,  2 laps     Seated HS & Calf stretch Combo in Chairs H30x3 B     Mini Wall Slides w/ Add Ball Squeeze between 2 chairs [id number]     Standing HR @ Wall 2x10           Seated Hip Abd YTB H2 2x10     Seated SAQ 3# H3 2x10     Supine Heel slides with SB  H5x15  hep   HL TrA w/ Add ball Squeeze H10x10  hep   Bridge w/ RI 5\" 2x10   Hep       Supine hip abd slides 2x10     Supine Psoas Release/Quad Stretch w/ strap H30x3                             Manual Intervention (68008)      Supine: quad & hip flexor rolling & STM 7 min     SL w/ 2 pillows @ knees: IT Band and gluteal rolling followed by gentle hip flexor stretching 7 min                             NMR re-education (20014)   CUES NEEDED   Tandem balance 10\" 4xB     10x ea      l      4\" step LSU 2x10     Airdisc balance @ bar H10x10     GT w/ SC level surface, curb, stairs 10'                       Therapeutic Activity (40151)                                                Therapeutic Exercise and NMR EXR  [x] (85439) Provided verbal/tactile cueing for activities related to strengthening, flexibility, endurance, ROM for improvements in LE, proximal hip, and core control with self care, mobility, lifting, ambulation.  [] (94725) Provided verbal/tactile cueing for activities related to improving balance, coordination, kinesthetic sense, posture, motor skill, proprioception  to assist with LE, proximal hip, and core control in self care, mobility, lifting, ambulation and eccentric single leg control. NMR and Therapeutic Activities:    [x] (53014 or 54258) Provided verbal/tactile cueing for activities related to improving balance, coordination, kinesthetic sense, posture, motor skill, proprioception and motor activation to allow for proper function of core, proximal hip and LE with self care and ADLs  [] (13093) Gait Re-education- Provided training and instruction to the patient for proper LE, core and proximal hip recruitment and positioning and eccentric body weight control with ambulation re-education including up and down stairs     Home Exercise Program:    [x] (77740) Reviewed/Progressed HEP activities related to strengthening, flexibility, endurance, ROM of core, proximal hip and LE for functional self-care, mobility, lifting and ambulation/stair navigation   [] (48300)Reviewed/Progressed HEP activities related to improving balance, coordination, kinesthetic sense, posture, motor skill, proprioception of core, proximal hip and LE for self care, mobility, lifting, and ambulation/stair navigation      Manual Treatments:  PROM / STM / Oscillations-Mobs:  G-I, II, III, IV (PA's, Inf., Post.)  [x] (36320) Provided manual therapy to mobilize LE, proximal hip and/or LS spine soft tissue/joints for the purpose of modulating pain, promoting relaxation,  increasing ROM, reducing/eliminating soft tissue swelling/inflammation/restriction, improving soft tissue extensibility and allowing for proper ROM for normal function with self care, mobility, lifting and ambulation. Modalities:   Declined   [] GAME READY (VASO)- for significant edema, swelling, pain control.      Charges:  Timed Code Treatment Minutes: 55   Total Treatment Minutes: 55     [] EVAL (LOW) 71573   [] EVAL (MOD) 58059  [] EVAL (HIGH) 41225   [] RE-EVAL     [x] MG(98888) x  2   [] IONTO  [x] NMR (07663) x 1   [] VASO  [x] Manual (46307) x1      [] Other:  [] TA x      [] Mech Traction (66432)  [] ES(attended) (41193)      [] ES (un) (50447):       GOALS:   Short Term Goals: To be achieved in: 2 weeks  1. Independent in HEP and progression per patient tolerance, in order to prevent re-injury. [x]? Progressing: []? Met: []? Not Met: []? Adjusted  2. Patient will have a decrease in pain to facilitate improvement in movement, function, and ADLs as indicated by Functional Deficits. [x]? Progressing: []? Met: []? Not Met: []? Adjusted     Long Term Goals: To be achieved in: 12 weeks  1. Disability index score of 40 % or less for the LEFS to assist with reaching prior level of function. []? Progressing: []? Met: []? Not Met: []? Adjusted  2. Patient will demonstrate increased AROM left hip to neutral extension to allow for proper joint functioning as indicated by patients Functional Deficits. [x]? Progressing: []? Met: []? Not Met: []? Adjusted  3. Patient will demonstrate an increase in Strength to good proximal hip strength and control, within 5lb HHD in LE to allow for proper functional mobility as indicated by patients Functional Deficits. [x]? Progressing: []? Met: []? Not Met: []? Adjusted  4. Patient will ascend and descend a flight of stairs using handrails without increased symptoms or restriction. []? Progressing: []? Met: []? Not Met: []? Adjusted  5. Walk community distances w/o AD and good gait pattern. [x]? Progressing: []? Met: []? Not Met: []? Adjusted     Overall Progression Towards Functional goals/ Treatment Progress Update:  [x] Patient is progressing as expected towards functional goals listed. [] Progression is slowed due to complexities/Impairments listed. [] Progression has been slowed due to co-morbidities.   [] Plan just implemented, too soon to assess goals progression <30days   [] Goals require adjustment due to lack of progress  [] Patient is not progressing as expected and requires additional follow up with physician  [] Other    Prognosis for POC: [x] Good [] Fair  [] Poor      Patient requires continued skilled intervention: [x] Yes  [] No    Treatment/Activity Tolerance:  [x] Patient able to complete treatment  [] Patient limited by fatigue  [] Patient limited by pain    [] Patient limited by other medical complications  [] Other:     ASSESSMENT: Safe ambulation w/ Sc in clinic after GT. Ayesha'd TE progression wall. Limitation in standing CKC TE due to L > R knee OA & pain. PLAN:cont PT 2x week   [x] Continue per plan of care [] Alter current plan (see comments above)  [] Plan of care initiated [] Hold pending MD visit [] Discharge      Electronically signed by:  Millie Morel, PT, [id number]    Note: If patient does not return for scheduled/ recommended follow up visits, this note will serve as a discharge from care along with most recent update on progress.

## 2020-11-04 ENCOUNTER — OFFICE VISIT (OUTPATIENT)
Dept: ORTHOPEDIC SURGERY | Age: 70
End: 2020-11-04
Payer: MEDICARE

## 2020-11-04 VITALS — BODY MASS INDEX: 41.22 KG/M2 | WEIGHT: 224 LBS | HEIGHT: 62 IN

## 2020-11-04 PROCEDURE — 99024 POSTOP FOLLOW-UP VISIT: CPT | Performed by: PHYSICIAN ASSISTANT

## 2020-11-04 PROCEDURE — E0100 CANE ADJUST/FIXED WITH TIP: HCPCS | Performed by: ORTHOPAEDIC SURGERY

## 2020-11-04 NOTE — PROGRESS NOTES
Chief Complaint   Patient presents with    Post-Op Check     PO LEFT THR SX:9/24/2020     History of present illness: The patient returns today after left total hip replacement performed on 9/24/2020. She has been working well with physical therapy. She is using a walker still but has been working with them on transitioning to a cane. Her bed ulcer has healed since last visit and this is no longer limiting her. Physical examination: The incision is well-healed with no drainage or signs of infection. There is expected swelling with no evidence of DVT and a negative Homans sign. Neurovascular exam is intact. Leg length is appropriate. Assessment/plan: We would like to continue outpatient physical therapy to restore range of motion and strength. The patient was given local wound care instructions. He was provided with a cane today that she will transition to as tolerated they will followup in 3-4 months for reevaluation. Pacheco Guerrero, [facility]    This dictation was performed with a verbal recognition program [facility]) and it was checked for errors. It is possible that there are still dictated errors within this office note. If so, please bring any errors to my attention for an addendum. All efforts were made to ensure that this office note is accurate.

## 2020-11-05 ENCOUNTER — HOSPITAL ENCOUNTER (OUTPATIENT)
Dept: PHYSICAL THERAPY | Age: 70
Setting detail: THERAPIES SERIES
Discharge: HOME | End: 2020-11-05
Payer: MEDICARE

## 2020-11-05 PROCEDURE — 97140 MANUAL THERAPY 1/> REGIONS: CPT

## 2020-11-05 PROCEDURE — 97110 THERAPEUTIC EXERCISES: CPT

## 2020-11-05 PROCEDURE — 97112 NEUROMUSCULAR REEDUCATION: CPT

## 2020-11-05 NOTE — FLOWSHEET NOTE
[facility] [facility], [address]  Phone: [phone]  Fax [phone]    Physical Therapy Treatment Note/ Progress Report:           Date:  2020    Patient Name:  Mich Dillon    :  1950  MRN: [id number]  Restrictions/Precautions:  R & L TKR ( & ), 2 RTC SX, Lateral Left THR Precautions, Coccyx Ulcer, DM, HTN, HLD, Obesity, Atrial Fib, FADI. Medical/Treatment Diagnosis Information:  Diagnosis: Z96.642 S/P Left Lateral THR (DOS 2020)  Treatment Diagnosis: M25.552 Left Hip Pain, M25.652 Left Hip Stiffness, M62.81 Core & Left LE Weakness, R26.2 Difficulty walking, L89.159 Coccyx Ulcer  Insurance/Certification information:  PT Insurance Information: appssavvy   ($40CP, 100%, BMN, no auth)  Physician Information:  Referring Practitioner: Dr. Joce Blum  Has the plan of care been signed (Y/N):        []  Yes  [x]  No     Date of Patient follow up with Physician:3-4 months     Is this a Progress Report:     []  Yes  [x]  No      If Yes:  Date Range for reporting period:  Beginning: 10-  Ending:    Progress report will be due (10 Rx or 30 days whichever is less): 42-     Recertification will be due (POC Duration  / 90 days whichever is less): 2021     Visit # Insurance Allowable Auth Required   In-person 6 BMN []  Yes [x]  No    Telehealth   []  Yes []  No    Total        Functional Scale: LEFS= 76 %   Date assessed:10-      Therapy Diagnosis/Practice Pattern: 4 H     Number of Comorbidities:  []0     []1-2    [x]3+    Latex Allergy:  [x]NO      []YES  Preferred Language for Healthcare:   [x]English       []other:      Pain level:   2/10  Left hip (buttocks)   (Lat Approach) and L>R Knees     SUBJECTIVE:F/U with Dr. Joce Blum yesterday. Pleased with her progress so far in PT. Would fabiana her to continue with her therapy and gradually wean from [facility] as strength improves.   Pt states left knee pain may be limiting factor in progressing off of SC. Patient still using walker in community as instructed. OBJECTIVE:   6 weeks post-op (lateral 9/24/2020)  (8 weeks post op Ortho vitals: 11-19-20202)   Observation:    Test measurements:      RESTRICTIONS/PRECAUTIONS: Lateral THR Precautions. H/O Bilat TKR,  Coccyx open ulcer being RX'd by Dr. Char Pitts. Pt has DM.     Exercises/Interventions:     Therapeutic Ex (59494) Sets/sec Reps Notes/CUES   [place] 4 min     Standing Gastroc Stretch on Incline H30x4     Sidestepping over bolsters @ half wall Half way,  2 laps     Seated HS & Calf stretch Combo in Chairs H30x3 B     Mini Wall Slides w/ Add Ball Squeeze between 2 chairs [id number]     Standing HR @ Wall 2x10           Seated Hip Abd YTB H2 2x10     Seated SAQ 3# H3 2x10     Supine Heel slides with SB  H5x15  hep   HL TrA w/ Add ball Squeeze H10x10  hep   Bridge w/ AK 5\" 2x10   Hep   Clamshells 1.5# 3\"  2x10      Supine hip abd slides 2x10     Supine Psoas Release/Quad Stretch w/ strap H30x3     SLR w/ Q set 3x5                       Manual Intervention (67380)      Supine: quad & hip flexor rolling & STM 7 min     SL w/ 2 pillows @ knees: IT Band and gluteal rolling followed by gentle hip flexor stretching 7 min                             NMR re-education (38392)   CUES NEEDED   [place] Stairs 2 laps w/ 2 handrails 5 min     4xB      LSU laterals up & back  4\" step  2x10     Airdisc balance @ bar H10x10     FSU w/ [place]ra Hip Hike and step back 2\" step  2x10                 Therapeutic Activity (95741)                                                Therapeutic Exercise and NMR EXR  [x] (20667) Provided verbal/tactile cueing for activities related to strengthening, flexibility, endurance, ROM for improvements in LE, proximal hip, and core control with self care, mobility, lifting, ambulation.  [] (69882) Provided verbal/tactile cueing for activities related to improving balance, coordination, kinesthetic sense, posture, motor skill, proprioception  to assist with LE, proximal hip, and core control in self care, mobility, lifting, ambulation and eccentric single leg control. NMR and Therapeutic Activities:    [x] (77755 or 67404) Provided verbal/tactile cueing for activities related to improving balance, coordination, kinesthetic sense, posture, motor skill, proprioception and motor activation to allow for proper function of core, proximal hip and LE with self care and ADLs  [] (20028) Gait Re-education- Provided training and instruction to the patient for proper LE, core and proximal hip recruitment and positioning and eccentric body weight control with ambulation re-education including up and down stairs     Home Exercise Program:    [x] (58040) Reviewed/Progressed HEP activities related to strengthening, flexibility, endurance, ROM of core, proximal hip and LE for functional self-care, mobility, lifting and ambulation/stair navigation   [] (35662)Reviewed/Progressed HEP activities related to improving balance, coordination, kinesthetic sense, posture, motor skill, proprioception of core, proximal hip and LE for self care, mobility, lifting, and ambulation/stair navigation      Manual Treatments:  PROM / STM / Oscillations-Mobs:  G-I, II, III, IV (PA's, Inf., Post.)  [x] (43263) Provided manual therapy to mobilize LE, proximal hip and/or LS spine soft tissue/joints for the purpose of modulating pain, promoting relaxation,  increasing ROM, reducing/eliminating soft tissue swelling/inflammation/restriction, improving soft tissue extensibility and allowing for proper ROM for normal function with self care, mobility, lifting and ambulation. Modalities:   10 min CP left hip   [] GAME READY (VASO)- for significant edema, swelling, pain control.      Charges:  Timed Code Treatment Minutes: 55   Total Treatment Minutes: 65     [] EVAL (LOW) 07462   [] EVAL (MOD) 28095  [] EVAL (HIGH) 78691   [] RE-EVAL     [x] NG(17773) x  2   [] IONTO  [x] NMR (32342) x 1   [] VASO  [x] Manual (41555) x1      [] Other:  [] TA x      [] Mech Traction (84935)  [] ES(attended) (86293)      [] ES (un) (24553):       GOALS:   Short Term Goals: To be achieved in: 2 weeks  1. Independent in HEP and progression per patient tolerance, in order to prevent re-injury. [x]? Progressing: []? Met: []? Not Met: []? Adjusted  2. Patient will have a decrease in pain to facilitate improvement in movement, function, and ADLs as indicated by Functional Deficits. [x]? Progressing: []? Met: []? Not Met: []? Adjusted     Long Term Goals: To be achieved in: 12 weeks  1. Disability index score of 40 % or less for the LEFS to assist with reaching prior level of function. []? Progressing: []? Met: []? Not Met: []? Adjusted  2. Patient will demonstrate increased AROM left hip to neutral extension to allow for proper joint functioning as indicated by patients Functional Deficits. [x]? Progressing: []? Met: []? Not Met: []? Adjusted  3. Patient will demonstrate an increase in Strength to good proximal hip strength and control, within 5lb HHD in LE to allow for proper functional mobility as indicated by patients Functional Deficits. [x]? Progressing: []? Met: []? Not Met: []? Adjusted  4. Patient will ascend and descend a flight of stairs using handrails without increased symptoms or restriction. []? Progressing: []? Met: []? Not Met: []? Adjusted  5. Walk community distances w/o AD and good gait pattern. [x]? Progressing: []? Met: []? Not Met: []? Adjusted     Overall Progression Towards Functional goals/ Treatment Progress Update:  [x] Patient is progressing as expected towards functional goals listed. [] Progression is slowed due to complexities/Impairments listed. [] Progression has been slowed due to co-morbidities.   [] Plan just implemented, too soon to assess goals progression <30days   [] Goals require adjustment due to lack of progress  [] Patient is not progressing as expected and requires additional follow up with physician  [] Other    Prognosis for POC: [x] Good [] Fair  [] Poor      Patient requires continued skilled intervention: [x] Yes  [] No    Treatment/Activity Tolerance:  [x] Patient able to complete treatment  [] Patient limited by fatigue  [] Patient limited by pain    [] Patient limited by other medical complications  [] Other:     ASSESSMENT: Pt indep in SC ambulation in clinic on level surface, but still fatigues fairly quickly so will continue to use walker in community. PLAN:cont PT 2x week   [x] Continue per plan of care [] Alter current plan (see comments above)  [] Plan of care initiated [] Hold pending MD visit [] Discharge      Electronically signed by:  Melissa Wei, PT, [id number]    Note: If patient does not return for scheduled/ recommended follow up visits, this note will serve as a discharge from care along with most recent update on progress.

## 2020-11-09 NOTE — PROGRESS NOTES
[facility]   Electrophysiology Note      Date: 11/10/20  Patient Name: Juana Cortes  YOB: 1950     Chief Complaint: Atrial Fibrillation and 1 Year Follow Up    Primary Care Physician: Pee Romo DO     HISTORY OF PRESENT ILLNESS: Juana Cortes is a 65 y.o. female who presents for follow up for paroxysmal atrial fibrillation. She has a past medical history of PAF, FADI, HTN, and DM. She wore a 24 HR Cardiac Event Monitor in 8/2013 with avg HR 69 () that demonstrated SR with PAC's and PVC's. She has been taking flecainide for PAF. She briefly stopped taking flecainide in 6/2016 due to cost but restarted due to palpitations. Her primary care physician manages her coumadin and her INR. She underwent an echocardiogram on 7/21/20 that demonstrated an EF of 55% with no regional wall motion abnormalities, mild AS, AR, MR and MT, LA normal in size. Her EKG today 11/10/20 demonstrates SR with HR 87 BPM. Today 11/10/20 she presents using a dockery and had recent hip surgery. She reports she is feeling well from a cardiac standpoint. She reports she occasionally feels palpitations that are very brief and not bothersome. She reports she is taking her medications as prescribed and tolerates them well. She denies any abnormal bleeding or bruising. She reports her coumadin is managed by her PCP and she reports she is usually therapeutic. She reports she is increasing her activity as tolerated due to her recent hip surgery. She reports she is tolerating her activity well. Patient denies current edema,chest pain, sob, palpitations, dizziness or syncope. Past Medical History:   has a past medical history of Atrial fibrillation ([address] Diabetes mellitus ([facility] [address] Hyperlipidemia, Hypertension, Olecranon bursitis, left elbow, and FADI (obstructive sleep apnea). Past Surgical History:   has a past surgical history that includes Tubal ligation;  Hysterectomy; joint replacement (Bilateral); Rotator cuff repair (Bilateral); other surgical history (11/12/2014); Finger trigger release (Right, 10/04/2017); eye surgery (Bilateral); Tonsillectomy; Colonoscopy; and Total hip arthroplasty (Left, 9/24/2020). Social History:   reports that she has never smoked. She has never used smokeless tobacco. She reports that she does not drink alcohol or use drugs. Family History: family history includes Cancer in her father and mother. Allergies:  Furosemide; Hydrocodone-acetaminophen; Sitagliptin; and Morphine    Home Medications:    Prior to Admission medications    Medication Sig Start Date End Date Taking? Authorizing Provider   glipiZIDE (GLUCOTROL XL) 5 MG extended release tablet Take 5 mg by mouth daily   Yes Historical Provider, MD   Cholecalciferol (VITAMIN D-3) 25 MCG (1000 UT) CAPS Take by mouth daily   Yes Historical Provider, MD   flecainide (TAMBOCOR) 50 MG tablet TAKE ONE TABLET BY MOUTH TWICE A DAY 9/16/20  Yes [place], APRN - CNP   Probiotic Product (PROBIOTIC PO) Take 1 tablet by mouth daily   Yes Historical Provider, MD   hydrochlorothiazide (HYDRODIURIL) 25 MG tablet Take 1 tablet by mouth daily 10/2/18  Yes Pavel KIMO azul CNP   atorvastatin (LIPITOR) 40 MG tablet Take 40 mg by mouth nightly  4/20/17  Yes Historical Provider, MD   warfarin (COUMADIN) 2 MG tablet Take 2 tablets by mouth daily 7/13/16  Yes Pavel Cub RunKIMO azul CNP   metFORMIN (GLUCOPHAGE) 500 MG tablet Take 1,000 mg by mouth 2 times daily (with meals)    Yes Historical Provider, MD   losartan (COZAAR) 100 MG tablet Take 100 mg by mouth daily. Yes Historical Provider, MD       REVIEW OF SYSTEMS:      All 14-point review of systems are completed and pertinent positives are mentioned in the history of present illness. Other systems are reviewed and are negative.      Physical Examination:    /60   Pulse 93   Ht 5' 2\" (1.575 m)   Wt 228 lb 8 oz (103.6 kg)   SpO2 98%   BMI 41.79 kg/m² Constitutional and General Appearance: alert, cooperative, no distress and appears stated age  [de-identified]: PERRL, no cervical lymphadenopathy. No masses palpable. Normal oral mucosa  Respiratory:  · Normal excursion and expansion without use of accessory muscles  · Resp Auscultation: Normal breath sounds without dullness or wheezing  Cardiovascular:  · The apical impulse is not displaced  · RRR S1S2 w/o M/G/R  Abdomen:  · No masses or tenderness  · Bowel sounds present  Extremities:  ·  No Cyanosis or Clubbing  ·  Lower extremity edema: No  · Skin: Warm and dry  Neurological:  · Alert and oriented. · Moves all extremities well  · No abnormalities of mood, affect, memory, mentation, or behavior are noted    DATA:    EC/10/20: SR with HR 87 BPM    ECHO: 20:  Summary   Normal left ventricle systolic function with an estimated ejection fraction   of 55%. No regional wall motion abnormalities are seen. Normal left ventricular diastolic filling pressure. The aortic valve appears mildly thickened/calcified with decreased leaflet   mobility. Mild aortic stenosis. Mild aortic regurgitation. Mild mitral and pulmonic regurgitation. Systolic pulmonary artery pressure (SPAP) is normal and estimated at 28 mmHg   (right atrial pressure 3 mmHg). ECHO: 17:  Summary   Technically difficult examination. Normal left ventricle systolic function with an estimated ejection fraction   of 55%. No regional wall motion abnormalities are seen. Normal left ventricular diastolic filling pressure. MIld pulmonic regurgitation. Systolic pulmonary artery pressure (SPAP) is normal and estimated at 26 mmHg   (RA pressure 3 mmHg). IMPRESSION:    1. Paroxysmal Atrial Fibrillation ([id number])  11/10/2020  Mrs. Sarah King is a pleasant 65year old female with a medial history significant for paroxysmal atrial fibrillation, hypertension, and diabetes mellitus tyep II who presents from home to establish care. According to patient she continues to intermittent palpitations that were previously thought to be secondary to PACs and is during her paroxysmal atrial fibrillation she did not note any palpitations. She has been tolerating her medicine flecainide however is concerned about the price of the medication. She is not on diltiazem per notes because of her request.  Patient does not appear to be interested in more aggressive therapy as she is not really symptomatic from her atrial fibrillation. She did know when she was off of her flecainide she did seem to feel more palpitations. I think that while being on a 1C agent you should also be on a rate control agent. Given the cost issue and the lack of symptoms currently I think it be reasonable to stop the flecainide and start her on low-dose diltiazem at nighttime. I will have her start this and follow-up with nurse practitioner in a couple months see how she is feeling. If she is symptomatic but tolerating her diltiazem we will restart the flecainide. If her blood pressure is low we will stop her hydrochlorothiazide in favor of the diltiazem. - Stop flecainide.  - Start diltiazem. - Continue warfarin (managed by PCP.  - Follow up with EP NP in 2-3 months unless/until procedure/discussion required or PRN. RECOMMENDATIONS:  1. Stop taking Flecainide  2. Start taking Cardizem 120 mg nightly for rate control  3. Continue other medications as prescribed  4. Follow up with EP NP in 3 months for medication changes     QUALITY MEASURES  1. Tobacco Cessation Counseling: NA  2. Retake of BP if >140/90:   NA  3. Documentation to PCP/referring for new patient:  Sent to PCP at close of office visit  4. CAD patient on anti-platelet: NA  5. CAD patient on STATIN therapy:  Yes  6. Patient with CHF and aFib on anticoagulation:  Yes     All questions and concerns were addressed to the patient/family. Alternatives to my treatment were discussed.     This note was scribed in the presence of Kris Narayanan MD by Deborah Leyden. Nikki Espana RN. Dr. Rubens Cohen MD  [facility]. [address]. [place]  Phone: [phone]  Fax: [phone]   11/10/2020     NOTE: This report was transcribed using voice recognition software. Every effort was made to ensure accuracy, however, inadvertent computerized transcription errors may be present. The scribe's documentation has been prepared under my direction and personally reviewed by me in its entirety. I confirm that the note above accurately reflects all work, physical examination, the discussion of treatments and procedures, and medical decision making performed by me. Arvel Dance, MD personally performed the services described in this documentation as scribed by nurse in my presence, and is both accurate and complete.     Electronically signed by Mercedes Suresh MD on 11/10/2020 at 8:38 AM 24 weeks gestation of pregnancy

## 2020-11-10 ENCOUNTER — OFFICE VISIT (OUTPATIENT)
Dept: CARDIOLOGY CLINIC | Age: 70
End: 2020-11-10
Payer: MEDICARE

## 2020-11-10 ENCOUNTER — HOSPITAL ENCOUNTER (OUTPATIENT)
Dept: PHYSICAL THERAPY | Age: 70
Setting detail: THERAPIES SERIES
Discharge: HOME | End: 2020-11-10
Payer: MEDICARE

## 2020-11-10 VITALS
DIASTOLIC BLOOD PRESSURE: 60 MMHG | WEIGHT: 228.5 LBS | HEART RATE: 93 BPM | BODY MASS INDEX: 42.05 KG/M2 | HEIGHT: 62 IN | SYSTOLIC BLOOD PRESSURE: 100 MMHG | OXYGEN SATURATION: 98 %

## 2020-11-10 PROCEDURE — 97140 MANUAL THERAPY 1/> REGIONS: CPT

## 2020-11-10 PROCEDURE — 99214 OFFICE O/P EST MOD 30 MIN: CPT | Performed by: INTERNAL MEDICINE

## 2020-11-10 PROCEDURE — 97110 THERAPEUTIC EXERCISES: CPT

## 2020-11-10 PROCEDURE — 97112 NEUROMUSCULAR REEDUCATION: CPT

## 2020-11-10 PROCEDURE — 93000 ELECTROCARDIOGRAM COMPLETE: CPT | Performed by: INTERNAL MEDICINE

## 2020-11-10 RX ORDER — DILTIAZEM HYDROCHLORIDE 120 MG/1
120 CAPSULE, COATED, EXTENDED RELEASE ORAL DAILY
Qty: 90 CAPSULE | Refills: 3 | Status: SHIPPED | OUTPATIENT
Start: 2020-11-10 | End: 2021-09-07

## 2020-11-10 NOTE — FLOWSHEET NOTE
[facility], [address]  Phone: [phone]  Fax [phone]    Physical Therapy Treatment Note/ Progress Report:           Date:  11/10/2020    Patient Name:  Amber Wright    :  1950  MRN: [id number]  Restrictions/Precautions:  R & L TKR ( & ), 2 RTC SX, Lateral Left THR Precautions, Coccyx Ulcer, DM, HTN, HLD, Obesity, Atrial Fib, FADI. Medical/Treatment Diagnosis Information:  Diagnosis: Z96.642 S/P Left Lateral THR (DOS 2020)  Treatment Diagnosis: M25.552 Left Hip Pain, M25.652 Left Hip Stiffness, M62.81 Core & Left LE Weakness, R26.2 Difficulty walking, L89.159 Coccyx Ulcer  Insurance/Certification information:  PT Insurance Information: [employer]   ($40CP, 100%, BMN, no auth)  Physician Information:  Referring Practitioner: Dr. Beatriz Rosenberg  Has the plan of care been signed (Y/N):        []  Yes  [x]  No     Date of Patient follow up with Physician:3-4 months     Is this a Progress Report:     []  Yes  [x]  No      If Yes:  Date Range for reporting period:  Beginning: 10-  Ending:    Progress report will be due (10 Rx or 30 days whichever is less): 76-     Recertification will be due (POC Duration  / 90 days whichever is less): 2021     Visit # Insurance Allowable Auth Required   In-person 7 BMN []  Yes [x]  No    Telehealth   []  Yes []  No    Total        Functional Scale: LEFS= 76 %   Date assessed:10-      Therapy Diagnosis/Practice Pattern: 4 H     Number of Comorbidities:  []0     []1-2    [x]3+    Latex Allergy:  [x]NO      []YES  Preferred Language for Healthcare:   [x]English       []other:      Pain level:    0-2/10  Left hip (buttocks)   (Lat Approach) and L>R Knees     SUBJECTIVE: Left hip feeling much better. Still needs SC to walk w/o a limp.   L > R knee pain are her biggest issues right now, but Dr. Beatriz Rosenberg has told her she should not consider TKR Revisions yet.    OBJECTIVE:   6.5 weeks post-op (lateral 9/24/2020)  (8 weeks post op Ortho vitals: 11-19-20202)   Observation:    Test measurements:      RESTRICTIONS/PRECAUTIONS: Lateral THR Precautions. H/O Bilat TKR,  Coccyx open ulcer being RX'd by Dr. Major Estrada. Pt has DM.     Exercises/Interventions:     Therapeutic Ex (45120) Sets/sec Reps Notes/CUES   Bike  4 min     Standing Gastroc Stretch on Incline H30x4     Sidestepping @ half wall YTB @ knees  2 laps             Leg Press 60# 3x10     Trustretch HS Stretch H30x3     Standing HR @ bar 2x10     Equip HS Curls 20# 3x10               PATTI Hip Abd 20# 2x10     Standing Hip Adductor Stretch @ bar H30x3     Seated SAQ 3# H3 2x10     Supine Heel slides with SB  H5x15  hep   HL TrA w/ Add ball Squeeze H10x10  hep   SB Bridge 5\" 2x10   Hep   Clamshells 1.5# 3\"  2x10      Supine hip abd slides 2x10     Supine Psoas Release/Quad Stretch w/ strap H30x3     SLR w/ Q set 2x10                       Manual Intervention (25040)      Supine: quad & hip flexor rolling & STM 7 min     SL w/ 2 pillows @ knees: IT Band and gluteal rolling followed by gentle hip flexor stretching 7 min                             NMR re-education (67008)   CUES NEEDED       4xB      LSU w/ hip abd 2\" step  2x10     Airdisc balance @ bar H10x10     FSU w/ Contra Hip Hike and step back 2\" step  2x10                             Therapeutic Activity (27207)                                                Therapeutic Exercise and NMR EXR  [x] (94821) Provided verbal/tactile cueing for activities related to strengthening, flexibility, endurance, ROM for improvements in LE, proximal hip, and core control with self care, mobility, lifting, ambulation.  [] (35927) Provided verbal/tactile cueing for activities related to improving balance, coordination, kinesthetic sense, posture, motor skill, proprioception  to assist with LE, proximal hip, and core control in self care, mobility, lifting, ambulation and eccentric single leg control. NMR and Therapeutic Activities:    [x] (68684 or 28503) Provided verbal/tactile cueing for activities related to improving balance, coordination, kinesthetic sense, posture, motor skill, proprioception and motor activation to allow for proper function of core, proximal hip and LE with self care and ADLs  [] (95762) Gait Re-education- Provided training and instruction to the patient for proper LE, core and proximal hip recruitment and positioning and eccentric body weight control with ambulation re-education including up and down stairs     Home Exercise Program:    [x] (67026) Reviewed/Progressed HEP activities related to strengthening, flexibility, endurance, ROM of core, proximal hip and LE for functional self-care, mobility, lifting and ambulation/stair navigation   [] (59245)Reviewed/Progressed HEP activities related to improving balance, coordination, kinesthetic sense, posture, motor skill, proprioception of core, proximal hip and LE for self care, mobility, lifting, and ambulation/stair navigation      Manual Treatments:  PROM / STM / Oscillations-Mobs:  G-I, II, III, IV (PA's, Inf., Post.)  [x] (82629) Provided manual therapy to mobilize LE, proximal hip and/or LS spine soft tissue/joints for the purpose of modulating pain, promoting relaxation,  increasing ROM, reducing/eliminating soft tissue swelling/inflammation/restriction, improving soft tissue extensibility and allowing for proper ROM for normal function with self care, mobility, lifting and ambulation. Modalities:   10 min CP left hip   [] GAME READY (VASO)- for significant edema, swelling, pain control.      Charges:  Timed Code Treatment Minutes: 55   Total Treatment Minutes: 65     [] EVAL (LOW) 81415   [] EVAL (MOD) 89252  [] EVAL (HIGH) 62391   [] RE-EVAL     [x] EP(55240) x  2   [] IONTO  [x] NMR (11395) x 1   [] VASO  [x] Manual (18543) x1      [x] Other:CP  [] TA x      [] Mech Traction (53457)  [] ES(attended) (37512)      [] ES (un) (54473):       GOALS:   Short Term Goals: To be achieved in: 2 weeks  1. Independent in HEP and progression per patient tolerance, in order to prevent re-injury. [x]? Progressing: []? Met: []? Not Met: []? Adjusted  2. Patient will have a decrease in pain to facilitate improvement in movement, function, and ADLs as indicated by Functional Deficits. [x]? Progressing: []? Met: []? Not Met: []? Adjusted     Long Term Goals: To be achieved in: 12 weeks  1. Disability index score of 40 % or less for the LEFS to assist with reaching prior level of function. []? Progressing: []? Met: []? Not Met: []? Adjusted  2. Patient will demonstrate increased AROM left hip to neutral extension to allow for proper joint functioning as indicated by patients Functional Deficits. [x]? Progressing: []? Met: []? Not Met: []? Adjusted  3. Patient will demonstrate an increase in Strength to good proximal hip strength and control, within 5lb HHD in LE to allow for proper functional mobility as indicated by patients Functional Deficits. [x]? Progressing: []? Met: []? Not Met: []? Adjusted  4. Patient will ascend and descend a flight of stairs using handrails without increased symptoms or restriction. []? Progressing: []? Met: []? Not Met: []? Adjusted  5. Walk community distances w/o AD and good gait pattern. [x]? Progressing: []? Met: []? Not Met: []? Adjusted     Overall Progression Towards Functional goals/ Treatment Progress Update:  [x] Patient is progressing as expected towards functional goals listed. [] Progression is slowed due to complexities/Impairments listed. [] Progression has been slowed due to co-morbidities.   [] Plan just implemented, too soon to assess goals progression <30days   [] Goals require adjustment due to lack of progress  [] Patient is not progressing as expected and requires additional follow up with physician  [] Other    Prognosis for POC: [x] Good [] Fair  [] Poor      Patient requires continued skilled intervention: [x] Yes  [] No    Treatment/Activity Tolerance:  [x] Patient able to complete treatment  [] Patient limited by fatigue  [] Patient limited by pain    [] Patient limited by other medical complications  [] Other:     ASSESSMENT: Transitioned to equip w/o reports of discomfort today. Steady progress in TE tolerance. PLAN:cont PT 2x week   [x] Continue per plan of care [] Alter current plan (see comments above)  [] Plan of care initiated [] Hold pending MD visit [] Discharge      Electronically signed by:  Ross Noel, PT, [id number]    Note: If patient does not return for scheduled/ recommended follow up visits, this note will serve as a discharge from care along with most recent update on progress.

## 2020-11-10 NOTE — LETTER
[facility]   Electrophysiology Note      Date: 11/10/20  Patient Name: Harsha Stallworth  YOB: 1950     Chief Complaint: Atrial Fibrillation and 1 Year Follow Up    Primary Care Physician: Vicente Sarkar DO     HISTORY OF PRESENT ILLNESS: Harsha Stallworth is a 65 y.o. female who presents for follow up for paroxysmal atrial fibrillation. She has a past medical history of PAF, FADI, HTN, and DM. She wore a 24 HR Cardiac Event Monitor in 8/2013 with avg HR 69 () that demonstrated SR with PAC's and PVC's. She has been taking flecainide for PAF. She briefly stopped taking flecainide in 6/2016 due to cost but restarted due to palpitations. Her primary care physician manages her coumadin and her INR. She underwent an echocardiogram on 7/21/20 that demonstrated an EF of 55% with no regional wall motion abnormalities, mild AS, AR, MR and TN, LA normal in size. Her EKG today 11/10/20 demonstrates SR with HR 87 BPM. Today 11/10/20 she presents using a dockery and had recent hip surgery. She reports she is feeling well from a cardiac standpoint. She reports she occasionally feels palpitations that are very brief and not bothersome. She reports she is taking her medications as prescribed and tolerates them well. She denies any abnormal bleeding or bruising. She reports her coumadin is managed by her PCP and she reports she is usually therapeutic. She reports she is increasing her activity as tolerated due to her recent hip surgery. She reports she is tolerating her activity well. Patient denies current edema,chest pain, sob, palpitations, dizziness or syncope. Past Medical History:   has a past medical history of Atrial fibrillation ([facility] [address] Diabetes mellitus ([facility] [address] Hyperlipidemia, Hypertension, Olecranon bursitis, left elbow, and FADI (obstructive sleep apnea).     Past Surgical History:   has a past surgical history that includes Tubal ligation; Hysterectomy; joint replacement (Bilateral); Rotator cuff repair (Bilateral); other surgical history (11/12/2014); Finger trigger release (Right, 10/04/2017); eye surgery (Bilateral); Tonsillectomy; Colonoscopy; and Total hip arthroplasty (Left, 9/24/2020). Social History:   reports that she has never smoked. She has never used smokeless tobacco. She reports that she does not drink alcohol or use drugs. Family History: family history includes Cancer in her father and mother. Allergies:  Furosemide; Hydrocodone-acetaminophen; Sitagliptin; and Morphine    Home Medications:    Prior to Admission medications    Medication Sig Start Date End Date Taking? Authorizing Provider   glipiZIDE (GLUCOTROL XL) 5 MG extended release tablet Take 5 mg by mouth daily   Yes Historical Provider, MD   Cholecalciferol (VITAMIN D-3) 25 MCG (1000 UT) CAPS Take by mouth daily   Yes Historical Provider, MD   flecainide (TAMBOCOR) 50 MG tablet TAKE ONE TABLET BY MOUTH TWICE A DAY 9/16/20  Yes KIMO Garcia CNP   Probiotic Product (PROBIOTIC PO) Take 1 tablet by mouth daily   Yes Historical Provider, MD   hydrochlorothiazide (HYDRODIURIL) 25 MG tablet Take 1 tablet by mouth daily 10/2/18  Yes KIMO Garcia CNP   atorvastatin (LIPITOR) 40 MG tablet Take 40 mg by mouth nightly  4/20/17  Yes Historical Provider, MD   warfarin (COUMADIN) 2 MG tablet Take 2 tablets by mouth daily 7/13/16  Yes KIMO Garcia CNP   metFORMIN (GLUCOPHAGE) 500 MG tablet Take 1,000 mg by mouth 2 times daily (with meals)    Yes Historical Provider, MD   losartan (COZAAR) 100 MG tablet Take 100 mg by mouth daily. Yes Historical Provider, MD       REVIEW OF SYSTEMS:      All 14-point review of systems are completed and pertinent positives are mentioned in the history of present illness. Other systems are reviewed and are negative.      Physical Examination: significant for paroxysmal atrial fibrillation, hypertension, and diabetes mellitus tyep II who presents from home to establish care. According to patient she continues to intermittent palpitations that were previously thought to be secondary to PACs and is during her paroxysmal atrial fibrillation she did not note any palpitations. She has been tolerating her medicine flecainide however is concerned about the price of the medication. She is not on diltiazem per notes because of her request.  Patient does not appear to be interested in more aggressive therapy as she is not really symptomatic from her atrial fibrillation. She did know when she was off of her flecainide she did seem to feel more palpitations. I think that while being on a 1C agent you should also be on a rate control agent. Given the cost issue and the lack of symptoms currently I think it be reasonable to stop the flecainide and start her on low-dose diltiazem at nighttime. I will have her start this and follow-up with nurse practitioner in a couple months see how she is feeling. If she is symptomatic but tolerating her diltiazem we will restart the flecainide. If her blood pressure is low we will stop her hydrochlorothiazide in favor of the diltiazem. - Stop flecainide.  - Start diltiazem. - Continue warfarin (managed by PCP.  - Follow up with EP NP in 2-3 months unless/until procedure/discussion required or PRN. RECOMMENDATIONS:  1. Stop taking Flecainide  2. Start taking Cardizem 120 mg nightly for rate control  3. Continue other medications as prescribed  4. Follow up with EP NP in 3 months for medication changes     QUALITY MEASURES  1. Tobacco Cessation Counseling: NA  2. Retake of BP if >140/90:   NA  3. Documentation to PCP/referring for new patient:  Sent to PCP at close of office visit  4. CAD patient on anti-platelet: NA  5. CAD patient on STATIN therapy:  Yes  6.  Patient with CHF and aFib on anticoagulation:  Yes All questions and concerns were addressed to the patient/family. Alternatives to my treatment were discussed. This note was scribed in the presence of Cierra Small MD by Ernestina Case. Leo Ordonez RN. Dr. Rosy Kahn MD  Electrophysiology  [facility]. [address]. [place], [address]  Phone: [phone]  Fax: [phone]   11/10/2020     NOTE: This report was transcribed using voice recognition software. Every effort was made to ensure accuracy, however, inadvertent computerized transcription errors may be present. The scribe's documentation has been prepared under my direction and personally reviewed by me in its entirety. I confirm that the note above accurately reflects all work, physical examination, the discussion of treatments and procedures, and medical decision making performed by me. Wilma Schulte MD personally performed the services described in this documentation as scribed by nurse in my presence, and is both accurate and complete.     Electronically signed by Edwardo Chavez MD on 11/10/2020 at 8:38 AM

## 2020-11-12 ENCOUNTER — HOSPITAL ENCOUNTER (OUTPATIENT)
Dept: PHYSICAL THERAPY | Age: 70
Setting detail: THERAPIES SERIES
Discharge: HOME | End: 2020-11-12
Payer: MEDICARE

## 2020-11-12 PROCEDURE — 97110 THERAPEUTIC EXERCISES: CPT

## 2020-11-12 PROCEDURE — 97112 NEUROMUSCULAR REEDUCATION: CPT

## 2020-11-12 PROCEDURE — 97140 MANUAL THERAPY 1/> REGIONS: CPT

## 2020-11-12 NOTE — FLOWSHEET NOTE
[facility] [facility], [address], [facility]  Phone: [phone]  Fax [phone]    Physical Therapy Treatment Note/ Progress Report:           Date:  2020    Patient Name:  Rosa Oscar    :  1950  MRN: [id number]  Restrictions/Precautions:  R & L TKR ( & ), 2 RTC SX, Lateral Left THR Precautions, Coccyx Ulcer, DM, HTN, HLD, Obesity, Atrial Fib, FADI. Medical/Treatment Diagnosis Information:  Diagnosis: Z96.642 S/P Left Lateral THR (DOS 2020)  Treatment Diagnosis: M25.552 Left Hip Pain, M25.652 Left Hip Stiffness, M62.81 Core & Left LE Weakness, R26.2 Difficulty walking, L89.159 Coccyx Ulcer  Insurance/Certification information:  PT Insurance Information: [employer]   ($40CP, 100%, BMN, no auth)  Physician Information:  Referring Practitioner: Dr. Denys Galdamez  Has the plan of care been signed (Y/N):        []  Yes  [x]  No     Date of Patient follow up with Physician:3-4 months     Is this a Progress Report:     []  Yes  [x]  No      If Yes:  Date Range for reporting period:  Beginning: 10-  Ending:    Progress report will be due (10 Rx or 30 days whichever is less): 58-     Recertification will be due (POC Duration  / 90 days whichever is less): 2021     Visit # Insurance Allowable Auth Required   In-person 8 BMN []  Yes [x]  No    Telehealth   []  Yes []  No    Total        Functional Scale: LEFS= 76 %   Date assessed:10-      Therapy Diagnosis/Practice Pattern: 4 H     Number of Comorbidities:  []0     []1-2    [x]3+    Latex Allergy:  [x]NO      []YES  Preferred Language for Healthcare:   [x]English       []other:      Pain level:    0-1/10  Left hip (buttocks)   (Lat Approach) and L>R Knees                          8/10 left knee pain    SUBJECTIVE: Patient states that her left hip is feeling much better, but continues to have marked left knee pain that wakes her @ night.  States that assist with LE, proximal hip, and core control in self care, mobility, lifting, ambulation and eccentric single leg control. NMR and Therapeutic Activities:    [x] (15163 or 58230) Provided verbal/tactile cueing for activities related to improving balance, coordination, kinesthetic sense, posture, motor skill, proprioception and motor activation to allow for proper function of core, proximal hip and LE with self care and ADLs  [] (43036) Gait Re-education- Provided training and instruction to the patient for proper LE, core and proximal hip recruitment and positioning and eccentric body weight control with ambulation re-education including up and down stairs     Home Exercise Program:    [x] (47940) Reviewed/Progressed HEP activities related to strengthening, flexibility, endurance, ROM of core, proximal hip and LE for functional self-care, mobility, lifting and ambulation/stair navigation   [] (51192)Reviewed/Progressed HEP activities related to improving balance, coordination, kinesthetic sense, posture, motor skill, proprioception of core, proximal hip and LE for self care, mobility, lifting, and ambulation/stair navigation      Manual Treatments:  PROM / STM / Oscillations-Mobs:  G-I, II, III, IV (PA's, Inf., Post.)  [x] (54293) Provided manual therapy to mobilize LE, proximal hip and/or LS spine soft tissue/joints for the purpose of modulating pain, promoting relaxation,  increasing ROM, reducing/eliminating soft tissue swelling/inflammation/restriction, improving soft tissue extensibility and allowing for proper ROM for normal function with self care, mobility, lifting and ambulation. Modalities:   10 min CP left hip   [] GAME READY (VASO)- for significant edema, swelling, pain control.      Charges:  Timed Code Treatment Minutes: 60   Total Treatment Minutes: 70     [] EVAL (LOW) 15565   [] EVAL (MOD) 20019  [] EVAL (HIGH) 02132   [] RE-EVAL     [x] RH(93431) x  2   [] IONTO  [x] NMR (66122) x 1   [] VASO  [x] Manual (44501) x1      [x] Other:CP  [] TA x      [] [facility] T[facility] (53096)  [] ES(attended) (56081)      [] ES (un) (75023):       GOALS:   Short Term Goals: To be achieved in: 2 weeks  1. Independent in HEP and progression per patient tolerance, in order to prevent re-injury. []? Progressing: [x]? Met: []? Not Met: []? Adjusted  2. Patient will have a decrease in pain to facilitate improvement in movement, function, and ADLs as indicated by Functional Deficits. [x]? Progressing: []? Met: []? Not Met: []? Adjusted     Long Term Goals: To be achieved in: 12 weeks  1. Disability index score of 40 % or less for the LEFS to assist with reaching prior level of function. []? Progressing: []? Met: []? Not Met: []? Adjusted  2. Patient will demonstrate increased AROM left hip to neutral extension to allow for proper joint functioning as indicated by patients Functional Deficits. [x]? Progressing: []? Met: []? Not Met: []? Adjusted  3. Patient will demonstrate an increase in Strength to good proximal hip strength and control, within 5lb HHD in LE to allow for proper functional mobility as indicated by patients Functional Deficits. [x]? Progressing: []? Met: []? Not Met: []? Adjusted  4. Patient will ascend and descend a flight of stairs using handrails without increased symptoms or restriction. []? Progressing: []? Met: []? Not Met: []? Adjusted  5. Walk community distances w/o AD and good gait pattern. [x]? Progressing: []? Met: []? Not Met: []? Adjusted     Overall Progression Towards Functional goals/ Treatment Progress Update:  [x] Patient is progressing as expected towards functional goals listed. [] Progression is slowed due to complexities/Impairments listed. [] Progression has been slowed due to co-morbidities.   [] Plan just implemented, too soon to assess goals progression <30days   [] Goals require adjustment due to lack of progress  [] Patient is not progressing as expected and requires additional follow up with physician  [x] Other: progress is limited by left knee pain    Prognosis for POC: [x] Good [] Fair  [] Poor      Patient requires continued skilled intervention: [x] Yes  [] No    Treatment/Activity Tolerance:  [x] Patient able to complete treatment  [] Patient limited by fatigue  [x] Patient limited by pain/ left knee    [] Patient limited by other medical complications  [] Other:     ASSESSMENT: Steady progress in left hip strength and LE flexibility. PLAN:cont PT 2x week   [x] Continue per plan of care [] Alter current plan (see comments above)  [] Plan of care initiated [] Hold pending MD visit [] Discharge      Electronically signed by:  Nishi Pappas, PT, [id number]    Note: If patient does not return for scheduled/ recommended follow up visits, this note will serve as a discharge from care along with most recent update on progress.

## 2020-11-17 ENCOUNTER — HOSPITAL ENCOUNTER (OUTPATIENT)
Dept: PHYSICAL THERAPY | Age: 70
Setting detail: THERAPIES SERIES
Discharge: HOME | End: 2020-11-17
Payer: MEDICARE

## 2020-11-17 PROCEDURE — 97112 NEUROMUSCULAR REEDUCATION: CPT

## 2020-11-17 PROCEDURE — G0283 ELEC STIM OTHER THAN WOUND: HCPCS

## 2020-11-17 PROCEDURE — 97110 THERAPEUTIC EXERCISES: CPT

## 2020-11-17 PROCEDURE — 97140 MANUAL THERAPY 1/> REGIONS: CPT

## 2020-11-17 NOTE — FLOWSHEET NOTE
insertion and anterior tibial plateau region. OBJECTIVE: LEFS/Orthovitals/Progress Note NV. 7 weeks post-op (lateral 9/24/2020)  (8 weeks post op Ortho vitals: 11-19-20202)   Observation:    Test measurements:      RESTRICTIONS/PRECAUTIONS: Lateral THR Precautions. H/O Bilat TKR,  Coccyx open ulcer being RX'd by Dr. Mo Joaquin. Pt has DM.     Exercises/Interventions:     Therapeutic Ex (37930) Sets/sec Reps Notes/CUES   Bike  4 min     Standing Gastroc Stretch on Incline H30x4     Sidestepping w/ SC YTB @ knees  2 laps     Leg Press 60# 3x10     Trustretch HS Stretch H30x3     Standing HR @ bar 2x10     Equip HS Curls 25# 3x10     Mat's Edge Hip Extension w/ TrA 0# H2 2x10     PATTI Hip Abd 30# 2x10B     Standing Hip Adductor Stretch @ bar H30x3     Seated SAQ 3# H3 2x10     Supine Heel slides with SB  H5x15  hep   HL TrA w/ Add ball Squeeze H10x10  hep   SB Bridge 5\" 2x10   Hep       Supine Psoas Release/Quad Stretch w/ strap H30x3     SLR w/ Q set 2x10                       Manual Intervention (14424)      Supine: quad & hip flexor rolling & STM 7 min     SL w/ 2 pillows @ knees: IT Band and gluteal rolling followed by gentle hip flexor stretching 7 min                             NMR re-education (33844)   CUES NEEDED       4xB      LSU w/ hip abd 2\" step  2x10     Airdisc balance @ bar H10x10     FSU w/ Contra Hip Hike and step back 2\" step  2x10     Airdisc Balance EO & EC H5x10 ea                       Therapeutic Activity (55282)                                                Therapeutic Exercise and NMR EXR  [x] (90217) Provided verbal/tactile cueing for activities related to strengthening, flexibility, endurance, ROM for improvements in LE, proximal hip, and core control with self care, mobility, lifting, ambulation.  [] (82746) Provided verbal/tactile cueing for activities related to improving balance, coordination, kinesthetic sense, posture, motor skill, proprioception  to assist with LE, proximal hip, and core control in self care, mobility, lifting, ambulation and eccentric single leg control. NMR and Therapeutic Activities:    [x] (45061 or 33489) Provided verbal/tactile cueing for activities related to improving balance, coordination, kinesthetic sense, posture, motor skill, proprioception and motor activation to allow for proper function of core, proximal hip and LE with self care and ADLs  [] (73321) Gait Re-education- Provided training and instruction to the patient for proper LE, core and proximal hip recruitment and positioning and eccentric body weight control with ambulation re-education including up and down stairs     Home Exercise Program:    [x] (26334) Reviewed/Progressed HEP activities related to strengthening, flexibility, endurance, ROM of core, proximal hip and LE for functional self-care, mobility, lifting and ambulation/stair navigation   [] (91160)Reviewed/Progressed HEP activities related to improving balance, coordination, kinesthetic sense, posture, motor skill, proprioception of core, proximal hip and LE for self care, mobility, lifting, and ambulation/stair navigation      Manual Treatments:  PROM / STM / Oscillations-Mobs:  G-I, II, III, IV (PA's, Inf., Post.)  [x] (12477) Provided manual therapy to mobilize LE, proximal hip and/or LS spine soft tissue/joints for the purpose of modulating pain, promoting relaxation,  increasing ROM, reducing/eliminating soft tissue swelling/inflammation/restriction, improving soft tissue extensibility and allowing for proper ROM for normal function with self care, mobility, lifting and ambulation. Modalities:   15 min CP left hip & PM ES/CP left knee   [] GAME READY (VASO)- for significant edema, swelling, pain control.      Charges:  Timed Code Treatment Minutes: 50   Total Treatment Minutes: 65     [] EVAL (LOW) 53931   [] EVAL (MOD) 29903  [] EVAL (HIGH) 15043   [] RE-EVAL     [x] GD(43075) x  1   [] IONTO  [x] NMR (56632) x 1   [] VASO  [x] Manual (60886) x1      [x] Other:CP  [] TA x      [] Mech Traction (43330)  [] ES(attended) (09701)      [x] ES (un) (41859):       GOALS:   Short Term Goals: To be achieved in: 2 weeks  1. Independent in HEP and progression per patient tolerance, in order to prevent re-injury. []? Progressing: [x]? Met: []? Not Met: []? Adjusted  2. Patient will have a decrease in pain to facilitate improvement in movement, function, and ADLs as indicated by Functional Deficits. []? Progressing: [x]? Met:for left hip but not left knee []? Not Met: []? Adjusted     Long Term Goals: To be achieved in: 12 weeks  1. Disability index score of 40 % or less for the LEFS to assist with reaching prior level of function. []? Progressing: []? Met: []? Not Met: []? Adjusted  2. Patient will demonstrate increased AROM left hip to neutral extension to allow for proper joint functioning as indicated by patients Functional Deficits. []? Progressing: [x]? Met: []? Not Met: []? Adjusted  3. Patient will demonstrate an increase in Strength to good proximal hip strength and control, within 5lb HHD in LE to allow for proper functional mobility as indicated by patients Functional Deficits. [x]? Progressing: []? Met: []? Not Met: []? Adjusted  4. Patient will ascend and descend a flight of stairs using handrails without increased symptoms or restriction. [x]? Progressing:Limited by.left knee pain []? Met: []? Not Met: []? Adjusted  5. Walk community distances w/o AD and good gait pattern. [x]? Progressing:Limited by left knee pain. []? Met: []? Not Met: []? Adjusted     Overall Progression Towards Functional goals/ Treatment Progress Update:  [x] Patient is progressing as expected towards functional goals listed. [] Progression is slowed due to complexities/Impairments listed. [] Progression has been slowed due to co-morbidities.   [] Plan just implemented, too soon to assess goals progression <30days   [] Goals require adjustment due to lack of progress  [] Patient is not progressing as expected and requires additional follow up with physician  [x] Other: progress is limited by left knee pain    Prognosis for POC: [x] Good [] Fair  [] Poor      Patient requires continued skilled intervention: [x] Yes  [] No    Treatment/Activity Tolerance:  [x] Patient able to complete treatment  [] Patient limited by fatigue  [x] Patient limited by pain/ left knee    [] Patient limited by other medical complications  [] Other:     ASSESSMENT: Functional ROM left hip. PLAN:cont PT 2x week   [x] Continue per plan of care [] Alter current plan (see comments above)  [] Plan of care initiated [] Hold pending MD visit [] Discharge      Electronically signed by:  Marbin Murphy, PT, [id number]    Note: If patient does not return for scheduled/ recommended follow up visits, this note will serve as a discharge from care along with most recent update on progress.

## 2020-11-19 ENCOUNTER — HOSPITAL ENCOUNTER (OUTPATIENT)
Dept: PHYSICAL THERAPY | Age: 70
Setting detail: THERAPIES SERIES
Discharge: HOME | End: 2020-11-19
Payer: MEDICARE

## 2020-11-19 ENCOUNTER — TELEPHONE (OUTPATIENT)
Dept: ORTHOPEDIC SURGERY | Age: 70
End: 2020-11-19

## 2020-11-19 ENCOUNTER — OFFICE VISIT (OUTPATIENT)
Dept: ORTHOPEDIC SURGERY | Age: 70
End: 2020-11-19
Payer: MEDICARE

## 2020-11-19 VITALS — WEIGHT: 228 LBS | BODY MASS INDEX: 41.96 KG/M2 | HEIGHT: 62 IN

## 2020-11-19 PROCEDURE — 97140 MANUAL THERAPY 1/> REGIONS: CPT

## 2020-11-19 PROCEDURE — 99214 OFFICE O/P EST MOD 30 MIN: CPT | Performed by: ORTHOPAEDIC SURGERY

## 2020-11-19 PROCEDURE — 97110 THERAPEUTIC EXERCISES: CPT

## 2020-11-19 PROCEDURE — 97112 NEUROMUSCULAR REEDUCATION: CPT

## 2020-11-19 RX ORDER — AMOXICILLIN 500 MG/1
CAPSULE ORAL
Qty: 12 CAPSULE | Refills: 0 | Status: SHIPPED | OUTPATIENT
Start: 2020-11-19 | End: 2021-09-07

## 2020-11-19 NOTE — PROGRESS NOTES
[]?? Met: []?? Not Met: []?? Adjusted  2. Patient will demonstrate increased AROM left hip to neutral extension to allow for proper joint functioning as indicated by patients Functional Deficits. []?? Progressing: [x]? ? Met: []?? Not Met: []?? Adjusted  3. Patient will demonstrate an increase in Strength to good proximal hip strength and control, within 5lb HHD in LE to allow for proper functional mobility as indicated by patients Functional Deficits. [x]? ? Progressing: []?? Met: []?? Not Met: []?? Adjusted  4. Patient will ascend and descend a flight of stairs using handrails without increased symptoms or restriction. [x]? ? Progressing:Limited by.left knee pain []? ? Met: []?? Not Met: []?? Adjusted  5. Walk community distances w/o AD and good gait pattern. [x]? ? Progressing:Limited by left knee pain.   []?? Met: []?? Not Met: []?? Adjusted      [] No Improvement noted related to goals:/Patient's response to treatment/Additional assessment:    New or Updated Goals (if applicable):  [x] No change to goals established upon initial eval/last progress note:  New Goals:    Electronically signed by:  Mark Heath, [address]

## 2020-11-19 NOTE — PROGRESS NOTES
CHIEF COMPLAINT:    Chief Complaint   Patient presents with    Knee Pain     LEFT KNEE PAIN, RECENT LEFT THR 9/24/20       HISTORY OF PRESENT ILLNESS:    Azeem Naylor for evaluation treatment of her Lefton knee. She had a knee replacement done back in 2003 or 2002 on this knee either by me or by Dr. Alma Lara. She cannot remember who did which knee. Is a posterior cruciate retaining knee replacement is functioning well for many many years but now is becoming increasingly painful. She points to the tibial plateau region as the primary site of her discomfort. Patient also had a hip replacement done by us on 9/24/2020. She perceives a slight leg length discrepancy with her LEFT leg longer than her right. She still working in physical therapy on this. The patient is a 65 y.o. female   Past Medical History:   Diagnosis Date    Atrial fibrillation ([address]     Diabetes mellitus ([address]     Hyperlipidemia     Hypertension     Olecranon bursitis, left elbow 4/13/2017    FADI (obstructive sleep apnea)     CAN'T TOLERATE DEVICE        Work Status:      The pain assessment was noted & is as follows:  Pain Assessment  Location of Pain: Knee  Location Modifiers: Left  Severity of Pain: 8  Quality of Pain: Aching  Duration of Pain: Persistent  Frequency of Pain: Constant]      Work Status/Functionality:     Past Medical History: Medical history form was reviewed today & can be found in the media tab  Past Medical History:   Diagnosis Date    Atrial fibrillation ([address]     Diabetes mellitus ([address]     Hyperlipidemia     Hypertension     Olecranon bursitis, left elbow 4/13/2017    FADI (obstructive sleep apnea)     CAN'T TOLERATE DEVICE      Past Surgical History:     Past Surgical History:   Procedure Laterality Date    COLONOSCOPY      EYE SURGERY Bilateral     cataracts    FINGER TRIGGER RELEASE Right 10/04/2017    long    HYSTERECTOMY      JOINT REPLACEMENT Bilateral     bilateral knee replacements    OTHER SURGICAL HISTORY  11/12/2014    phacoemulsification of cataract with intraocular lens implant right eye    ROTATOR CUFF REPAIR Bilateral     bilateral rotator cuff repair    TONSILLECTOMY      TOTAL HIP ARTHROPLASTY Left 9/24/2020    LEFT TOTAL HIP REPLACEMENT WITH CELLSAVER      [facility] performed by Sierra Goetz MD at [address]       Current Medications:     Current Outpatient Medications:     dilTIAZem (CARDIZEM CD) 120 MG extended release capsule, Take 1 capsule by mouth daily, Disp: 90 capsule, Rfl: 3    glipiZIDE (GLUCOTROL XL) 5 MG extended release tablet, Take 5 mg by mouth daily, Disp: , Rfl:     Cholecalciferol (VITAMIN D-3) 25 MCG (1000 UT) CAPS, Take by mouth daily, Disp: , Rfl:     Probiotic Product (PROBIOTIC PO), Take 1 tablet by mouth daily, Disp: , Rfl:     hydrochlorothiazide (HYDRODIURIL) 25 MG tablet, Take 1 tablet by mouth daily, Disp: 90 tablet, Rfl: 3    atorvastatin (LIPITOR) 40 MG tablet, Take 40 mg by mouth nightly , Disp: , Rfl:     warfarin (COUMADIN) 2 MG tablet, Take 2 tablets by mouth daily, Disp: 60 tablet, Rfl: 0    metFORMIN (GLUCOPHAGE) 500 MG tablet, Take 1,000 mg by mouth 2 times daily (with meals) , Disp: , Rfl:     losartan (COZAAR) 100 MG tablet, Take 100 mg by mouth daily. , Disp: , Rfl:   Allergies:  Furosemide; Hydrocodone-acetaminophen; Sitagliptin; and Morphine  Social History:    reports that she has never smoked. She has never used smokeless tobacco. She reports that she does not drink alcohol or use drugs. Family History:   Family History   Problem Relation Age of Onset    Cancer Mother         lung    Cancer Father         lung       REVIEW OF SYSTEMS:   For new problems, a full review of systems will be found scanned in the patient's chart.   CONSTITUTIONAL: Denies unexplained weight loss, fevers, chills   NEUROLOGICAL: Denies unsteady gait or progressive weakness  SKIN: Denies skin changes, delayed healing, rash, itching PHYSICAL EXAM:    Vitals: Height 5' 2\" (1.575 m), weight 228 lb (103.4 kg). GENERAL EXAM:  · General Apparence: Patient is adequately groomed with no evidence of malnutrition. · Orientation: The patient is oriented to time, place and person. · Mood & Affect:The patient's mood and affect are appropriate       Lefton knee PHYSICAL EXAMINATION:  · Inspection: Well-healed surgical incision. No gross rotational angular deformity. · Palpation: Tender around the tibial plateau and tibial component primarily. · Range of Motion: 0-125 degrees    · Strength: 5/5    · Special Tests: No gross ligamentous instability. · Skin:  There are no rashes, ulcerations or lesions. · There are no distal dysvascular changes     Gait & station: Cane      Additional Examinations:        LEFT hip does demonstrate good range of motion. Kajal lower extremity is slightly longer than her right. Diagnostic Testing: The following x rays were read and interpreted by myself      1.  3 x-ray views of the Kajal knee demonstrates a previous CR knee replacement with no gross signs of loosening. There is there appears to be some polyethylene wear but nothing dramatic. Orders     Orders Placed This Encounter   Procedures    XR KNEE LEFT (3 VIEWS)     Standing Status:   Future     Number of Occurrences:   1     Standing Expiration Date:   11/19/2021     Order Specific Question:   Reason for exam:     Answer:   PAIN         Assessment / Treatment Plan:     1. Likely failing 18-year-old Lefton knee replacement. With ongoing symptoms we will get a go ahead and get a bone scan for further evaluation of actually both of her knees that are symptomatic to her. 2.  He has lost over 100 pounds since the knee replacements were done but she still understands that she is obese and the fact that she is got 17 years out of this knee is quite good. 3.  She does have a 1 cm longer LEFT leg than the right.   I explained this to her and this may continue to bother her. She was going to consider utilizing a leg lift on the right side. I have personally performed and/or participated in the history, exam and medical decision making and agree with all pertinent clinical information. I have also reviewed and agree with the past medical, family and social history unless otherwise noted. This dictation was performed with a verbal recognition program (DRAGON) and it was checked for errors. It is possible that there are still dictated errors within this office note. If so, please bring any errors to my attention for an addendum. All efforts were made to ensure that this office note is accurate.           Aminah Geller MD

## 2020-11-19 NOTE — FLOWSHEET NOTE
[facility], [address] [place]  Phone: [phone]  Fax [phone]    Physical Therapy Treatment Note/ Progress Report:           Date:  2020    Patient Name:  Nakia Yang    :  1950  MRN: [id number]  Restrictions/Precautions:  R & L TKR ( & ), 2 RTC SX, Lateral Left THR Precautions, Coccyx Ulcer, DM, HTN, HLD, Obesity, Atrial Fib, FADI. Medical/Treatment Diagnosis Information:  Diagnosis: Z96.642 S/P Left Lateral THR (DOS 2020)  Treatment Diagnosis: M25.552 Left Hip Pain, M25.652 Left Hip Stiffness, M62.81 Core & Left LE Weakness, R26.2 Difficulty walking, L89.159 Coccyx Ulcer  Insurance/Certification information:  PT Insurance Information: [facility]  ($40CP, 100%, BMN, no auth)  Physician Information:  Referring Practitioner: Dr. Jim Asif  Has the plan of care been signed (Y/N):        [x]  Yes  []  No     Date of Patient follow up with Physician:3-4 months     MEDICARE CAP EXCEPTION DOCUMENTATION  I certify that this patient meets one of the below criteria necessary for becoming an exception to the Medicare cap on therapy services:    []  The patient has a condition identified by an ICD-10 code that has a direct and significant impact on the need for therapy. (Significantly impacts the rate of recovery.)                   []  The patient has a complexity identified by an ICD-10 code that has a direct and significant impact on the need for therapy. (Significantly impacts the rate of recovery and is associated with a primary condition.)         []  The patient has associated variables that influence the amount of treatment to include:  Social support, self-efficacy/motivation, prognosis, time since onset/acuity. [x]  The patient has generalized musculoskeletal conditions or a condition affecting multiple sites that will have a direct impact on the rate of recovery.     []  The patient had a prior episode of outpatient therapy during this calendar year for a different condition. []  The patient has a mental or cognitive disorder in addition to the condition being treated that will have a direct and significant impact on the rate of recovery. Is this a Progress Report:     [x]  Yes  []  No      If Yes:  Date Range for reporting period:  Beginning: 10-  Endin2020    Progress report will be due (10 Rx or 30 days whichever is less):       Recertification will be due (POC Duration  / 90 days whichever is less): 2021     Visit # Insurance Allowable Auth Required   In-person 10 BMN []  Yes [x]  No    Telehealth   []  Yes []  No    Total        Functional Scale: LEFS=          %   Date assessed:      2020      Therapy Diagnosis/Practice Pattern: 4 H     Number of Comorbidities:  []0     []1-2    [x]3+    Latex Allergy:  [x]NO      []YES  Preferred Language for Healthcare:   [x]English       []other:      Pain level:    0/10  Left hip (buttocks)   (Lat Approach) and L>R Knees                         8 /10 left knee pain    SUBJECTIVE:Patient states that she just had f/u appointment w/ Dr. Tonie Ibarra to evaluate her left knee. Patient states that a bone scan has been ordered for her right knee. She also states that Dr. Tonie Ibarra told her there is a 1 cm leg length discrepancy w/ longer let LE and she can try shoe lift on her right side. OBJECTIVE: See Progress Report. .  8 week Orthovitals Assessment completed today (see below). Patient advised to try soft shoe insert in right shoe this week.  Observation:    Test measurements:              RESTRICTIONS/PRECAUTIONS: Lateral THR Precautions. H/O Bilat TKR,  Coccyx open ulcer being RX'd by Dr. Char Pitts. Pt has DM.     Exercises/Interventions:     Therapeutic Ex (23898) Sets/sec Reps Notes/CUES   Bike  4 min     Standing Gastroc Stretch on Incline H30x4     Sidestepping w/ SC YTB @ knees  2 laps     Leg Press 60# 3x10     Trustretch HS Stretch H30x3     Standing HR @ bar 2x10     Equip HS Curls 25# 3x10     Mat's Edge Hip Extension w/ TrA 0# H2 2x10     PATTI Hip Abd 30# 2x10B     Standing Hip Adductor Stretch @ bar H30x3     Seated SAQ 3# H3 2x10     Supine Heel slides with SB  H5x15  hep   HL TrA w/ Add ball Squeeze H10x10  hep   SB Bridge 5\" 2x10   Hep       Supine Psoas Release/Quad Stretch w/ strap H30x3     SLR w/ Q set 2x10                       Manual Intervention (55810)      Supine: quad & hip flexor rolling & STM 7 min     SL w/ 2 pillows @ knees: IT Band and gluteal rolling followed by gentle hip flexor stretching 7 min                             NMR re-education (15400)   CUES NEEDED       4xB      LSU w/ hip abd 2\" step  2x10     Airdisc balance @ bar H10x10     FSU w/ Contra Hip Hike and step back 2\" step  2x10     Airdisc Balance EO & EC H5x10 ea                       Therapeutic Activity (94897)                                                Therapeutic Exercise and NMR EXR  [x] (06263) Provided verbal/tactile cueing for activities related to strengthening, flexibility, endurance, ROM for improvements in LE, proximal hip, and core control with self care, mobility, lifting, ambulation.  [] (36411) Provided verbal/tactile cueing for activities related to improving balance, coordination, kinesthetic sense, posture, motor skill, proprioception  to assist with LE, proximal hip, and core control in self care, mobility, lifting, ambulation and eccentric single leg control.      NMR and Therapeutic Activities:    [x] (76930 or 71147) Provided verbal/tactile cueing for activities related to improving balance, coordination, kinesthetic sense, posture, motor skill, proprioception and motor activation to allow for proper function of core, proximal hip and LE with self care and ADLs  [] (00356) Gait Re-education- Provided training and instruction to the patient for proper LE, core and proximal hip recruitment and Not Met: []? Adjusted     Long Term Goals: To be achieved in: 12 weeks  1. Disability index score of 40 % or less for the LEFS to assist with reaching prior level of function. [x]? Progressing: to 41% on 11-  []? Met: []? Not Met: []? Adjusted  2. Patient will demonstrate increased AROM left hip to neutral extension to allow for proper joint functioning as indicated by patients Functional Deficits. []? Progressing: [x]? Met: []? Not Met: []? Adjusted  3. Patient will demonstrate an increase in Strength to good proximal hip strength and control, within 5lb HHD in LE to allow for proper functional mobility as indicated by patients Functional Deficits. [x]? Progressing: []? Met: []? Not Met: []? Adjusted  4. Patient will ascend and descend a flight of stairs using handrails without increased symptoms or restriction. [x]? Progressing:Limited by.left knee pain []? Met: []? Not Met: []? Adjusted  5. Walk community distances w/o AD and good gait pattern. [x]? Progressing:Limited by left knee pain. []? Met: []? Not Met: []? Adjusted     Overall Progression Towards Functional goals/ Treatment Progress Update:  [x] Patient is progressing as expected towards functional goals listed. [] Progression is slowed due to complexities/Impairments listed. [] Progression has been slowed due to co-morbidities.   [] Plan just implemented, too soon to assess goals progression <30days   [] Goals require adjustment due to lack of progress  [] Patient is not progressing as expected and requires additional follow up with physician  [x] Other: progress is limited by left knee pain    Prognosis for POC: [x] Good [] Fair  [] Poor      Patient requires continued skilled intervention: [x] Yes  [] No    Treatment/Activity Tolerance:  [x] Patient able to complete treatment  [] Patient limited by fatigue  [x] Patient limited by pain/ left knee    [] Patient limited by other medical complications  [] Other:     ASSESSMENT: Steady progress in left Hip ROM and strength. Limitation in gait due to left knee pain and instability, but patient has made significant gains in quad strength over the past 4 weeks. PLAN:cont PT 2x week   [x] Continue per plan of care [] Alter current plan (see comments above)  [] Plan of care initiated [] Hold pending MD visit [] Discharge      Electronically signed by:  Jesús Pollock, PT, [id number]    Note: If patient does not return for scheduled/ recommended follow up visits, this note will serve as a discharge from care along with most recent update on progress.

## 2020-11-23 ENCOUNTER — HOSPITAL ENCOUNTER (OUTPATIENT)
Dept: PHYSICAL THERAPY | Age: 70
Setting detail: THERAPIES SERIES
Discharge: HOME | End: 2020-11-23
Payer: MEDICARE

## 2020-11-23 PROCEDURE — 97112 NEUROMUSCULAR REEDUCATION: CPT | Performed by: PHYSICAL THERAPIST

## 2020-11-23 PROCEDURE — 97140 MANUAL THERAPY 1/> REGIONS: CPT | Performed by: PHYSICAL THERAPIST

## 2020-11-23 PROCEDURE — 97110 THERAPEUTIC EXERCISES: CPT | Performed by: PHYSICAL THERAPIST

## 2020-11-23 NOTE — FLOWSHEET NOTE
[facility], [address] [place]  Phone: [phone]  Fax [phone]    Physical Therapy Treatment Note/ Progress Report:           Date:  2020    Patient Name:  Juju Barnes    :  1950  MRN: [id number]  Restrictions/Precautions:  R & L TKR ( & ), 2 RTC SX, Lateral Left THR Precautions, Coccyx Ulcer, DM, HTN, HLD, Obesity, Atrial Fib, FADI. Medical/Treatment Diagnosis Information:  Diagnosis: Z96.642 S/P Left Lateral THR (DOS 2020)  Treatment Diagnosis: M25.552 Left Hip Pain, M25.652 Left Hip Stiffness, M62.81 Core & Left LE Weakness, R26.2 Difficulty walking, L89.159 Coccyx Ulcer  Insurance/Certification information:  PT Insurance Information: [facility]  ($40CP, 100%, BMN, no auth)  Physician Information:  Referring Practitioner: Dr. Alise Mclaughlin  Has the plan of care been signed (Y/N):        [x]  Yes  []  No     Date of Patient follow up with Physician:3-4 months     MEDICARE CAP EXCEPTION DOCUMENTATION  I certify that this patient meets one of the below criteria necessary for becoming an exception to the Medicare cap on therapy services:    []  The patient has a condition identified by an ICD-10 code that has a direct and significant impact on the need for therapy. (Significantly impacts the rate of recovery.)                   []  The patient has a complexity identified by an ICD-10 code that has a direct and significant impact on the need for therapy. (Significantly impacts the rate of recovery and is associated with a primary condition.)         []  The patient has associated variables that influence the amount of treatment to include:  Social support, self-efficacy/motivation, prognosis, time since onset/acuity. [x]  The patient has generalized musculoskeletal conditions or a condition affecting multiple sites that will have a direct impact on the rate of recovery.     []  The patient had Standing Hip Adductor Stretch @ bar H30x3     Seated SAQ 3# H3 2x10     Supine Heel slides with SB  H5x15  hep   HL TrA w/ Add ball Squeeze H10x10  hep   SB Bridge 5\" 2x10   Hep       Supine Psoas Release/Quad Stretch w/ strap H30x3     SLR w/ Q set 2x10                       Manual Intervention (66036)      Supine: quad & hip flexor rolling & STM 7 min     SL w/ 2 pillows @ knees: IT Band and gluteal rolling followed by gentle hip flexor stretching 7 min                             NMR re-education (59944)   CUES NEEDED       4xB      LSU w/ hip abd 2\" step  2x10     Airdisc balance @ bar H10x10     FSU w/ Contra Hip Hike and step back 2\" step  2x10     Airdisc Balance EO & EC H5x10 ea                       Therapeutic Activity (66099)                                                Therapeutic Exercise and NMR EXR  [x] (95520) Provided verbal/tactile cueing for activities related to strengthening, flexibility, endurance, ROM for improvements in LE, proximal hip, and core control with self care, mobility, lifting, ambulation.  [] (11625) Provided verbal/tactile cueing for activities related to improving balance, coordination, kinesthetic sense, posture, motor skill, proprioception  to assist with LE, proximal hip, and core control in self care, mobility, lifting, ambulation and eccentric single leg control.      NMR and Therapeutic Activities:    [x] (11937 or 44906) Provided verbal/tactile cueing for activities related to improving balance, coordination, kinesthetic sense, posture, motor skill, proprioception and motor activation to allow for proper function of core, proximal hip and LE with self care and ADLs  [] (36475) Gait Re-education- Provided training and instruction to the patient for proper LE, core and proximal hip recruitment and positioning and eccentric body weight control with ambulation re-education including up and down stairs     Home Exercise Program:    [x] (82518) Reviewed/Progressed HEP activities related to strengthening, flexibility, endurance, ROM of core, proximal hip and LE for functional self-care, mobility, lifting and ambulation/stair navigation   [] (46284)Reviewed/Progressed HEP activities related to improving balance, coordination, kinesthetic sense, posture, motor skill, proprioception of core, proximal hip and LE for self care, mobility, lifting, and ambulation/stair navigation      Manual Treatments:  PROM / STM / Oscillations-Mobs:  G-I, II, III, IV (PA's, Inf., Post.)  [x] (61045) Provided manual therapy to mobilize LE, proximal hip and/or LS spine soft tissue/joints for the purpose of modulating pain, promoting relaxation,  increasing ROM, reducing/eliminating soft tissue swelling/inflammation/restriction, improving soft tissue extensibility and allowing for proper ROM for normal function with self care, mobility, lifting and ambulation. Modalities:     Declined; will apply CP at home if needed. [] GAME READY (VASO)- for significant edema, swelling, pain control. Charges:  Timed Code Treatment Minutes: 45   Total Treatment Minutes: 45     [] EVAL (LOW) 50680   [] EVAL (MOD) 18818  [] EVAL (HIGH) 39033   [] RE-EVAL     [x] LY(60840) x 1    [] IONTO  [x] NMR (78860) x 1   [] VASO  [x] Manual (88834) x1      [x] Other:CP  [] TA x      [] Mech Traction (92900)  [] ES(attended) (96684)      [] ES (un) (46472):       GOALS:   Short Term Goals: To be achieved in: 2 weeks  1. Independent in HEP and progression per patient tolerance, in order to prevent re-injury. []? Progressing: [x]? Met: []? Not Met: []? Adjusted  2. Patient will have a decrease in pain to facilitate improvement in movement, function, and ADLs as indicated by Functional Deficits. []? Progressing: [x]? Met:for left hip but not left knee []? Not Met: []? Adjusted     Long Term Goals: To be achieved in: 12 weeks  1.  Disability index score of 40 % or less for the LEFS to assist with reaching prior level of function. [x]? Progressing: to 41% on 11-  []? Met: []? Not Met: []? Adjusted  2. Patient will demonstrate increased AROM left hip to neutral extension to allow for proper joint functioning as indicated by patients Functional Deficits. []? Progressing: [x]? Met: []? Not Met: []? Adjusted  3. Patient will demonstrate an increase in Strength to good proximal hip strength and control, within 5lb HHD in LE to allow for proper functional mobility as indicated by patients Functional Deficits. [x]? Progressing: []? Met: []? Not Met: []? Adjusted  4. Patient will ascend and descend a flight of stairs using handrails without increased symptoms or restriction. [x]? Progressing:Limited by.left knee pain []? Met: []? Not Met: []? Adjusted  5. Walk community distances w/o AD and good gait pattern. [x]? Progressing:Limited by left knee pain. []? Met: []? Not Met: []? Adjusted     Overall Progression Towards Functional goals/ Treatment Progress Update:  [x] Patient is progressing as expected towards functional goals listed. [] Progression is slowed due to complexities/Impairments listed. [] Progression has been slowed due to co-morbidities.   [] Plan just implemented, too soon to assess goals progression <30days   [] Goals require adjustment due to lack of progress  [] Patient is not progressing as expected and requires additional follow up with physician  [x] Other: progress is limited by left knee pain    Prognosis for POC: [x] Good [] Fair  [] Poor      Patient requires continued skilled intervention: [x] Yes  [] No    Treatment/Activity Tolerance:  [x] Patient able to complete treatment  [] Patient limited by fatigue  [x] Patient limited by pain/ left knee    [] Patient limited by other medical complications  [] Other:     ASSESSMENT:     PLAN:cont PT 2x week   [x] Continue per plan of care [] Alter current plan (see comments above)  [] Plan of care initiated [] Hold pending MD visit [] Discharge      Electronically signed by:  [place], [id number]     Note: If patient does not return for scheduled/ recommended follow up visits, this note will serve as a discharge from care along with most recent update on progress.

## 2020-11-25 ENCOUNTER — HOSPITAL ENCOUNTER (OUTPATIENT)
Dept: PHYSICAL THERAPY | Age: 70
Setting detail: THERAPIES SERIES
Discharge: HOME | End: 2020-11-25
Payer: MEDICARE

## 2020-11-25 PROCEDURE — 97110 THERAPEUTIC EXERCISES: CPT | Performed by: PHYSICAL THERAPIST

## 2020-11-25 PROCEDURE — 97140 MANUAL THERAPY 1/> REGIONS: CPT | Performed by: PHYSICAL THERAPIST

## 2020-11-25 PROCEDURE — 97112 NEUROMUSCULAR REEDUCATION: CPT | Performed by: PHYSICAL THERAPIST

## 2020-12-01 ENCOUNTER — APPOINTMENT (OUTPATIENT)
Dept: PHYSICAL THERAPY | Age: 70
End: 2020-12-01
Payer: MEDICARE

## 2020-12-01 ENCOUNTER — HOSPITAL ENCOUNTER (OUTPATIENT)
Dept: NUCLEAR MEDICINE | Age: 70
Discharge: HOME | End: 2020-12-01
Payer: MEDICARE

## 2020-12-01 PROCEDURE — A9503 TC99M MEDRONATE: HCPCS | Performed by: ORTHOPAEDIC SURGERY

## 2020-12-01 PROCEDURE — 3430000000 HC RX DIAGNOSTIC RADIOPHARMACEUTICAL: Performed by: ORTHOPAEDIC SURGERY

## 2020-12-01 PROCEDURE — 78315 BONE IMAGING 3 PHASE: CPT

## 2020-12-01 RX ORDER — TC 99M MEDRONATE 20 MG/10ML
28 INJECTION, POWDER, LYOPHILIZED, FOR SOLUTION INTRAVENOUS
Status: COMPLETED | OUTPATIENT
Start: 2020-12-01 | End: 2020-12-01

## 2020-12-01 RX ADMIN — TC 99M MEDRONATE 28 MILLICURIE: 20 INJECTION, POWDER, LYOPHILIZED, FOR SOLUTION INTRAVENOUS at 08:01

## 2020-12-03 ENCOUNTER — HOSPITAL ENCOUNTER (OUTPATIENT)
Dept: PHYSICAL THERAPY | Age: 70
Setting detail: THERAPIES SERIES
Discharge: HOME | End: 2020-12-03
Payer: MEDICARE

## 2020-12-03 PROCEDURE — 97140 MANUAL THERAPY 1/> REGIONS: CPT

## 2020-12-03 PROCEDURE — 97110 THERAPEUTIC EXERCISES: CPT

## 2020-12-03 PROCEDURE — 97112 NEUROMUSCULAR REEDUCATION: CPT

## 2020-12-03 NOTE — FLOWSHEET NOTE
[facility], [address]  Phone: [phone]  Fax [phone]    Physical Therapy Treatment Note/ Progress Report:           Date:  12/3/2020    Patient Name:  Liliana Prieto    :  1950  MRN: [id number]  Restrictions/Precautions:  R & L TKR ( & ), 2 RTC SX, Lateral Left THR Precautions, Coccyx Ulcer, DM, HTN, HLD, Obesity, Atrial Fib, FADI. Medical/Treatment Diagnosis Information:  Diagnosis: Z96.642 S/P Left Lateral THR (DOS 2020)  Treatment Diagnosis: M25.552 Left Hip Pain, M25.652 Left Hip Stiffness, M62.81 Core & Left LE Weakness, R26.2 Difficulty walking, L89.159 Coccyx Ulcer  Insurance/Certification information:  PT Insurance Information: [facility]   ($40CP, 100%, BMN, no auth)  Physician Information:  Referring Practitioner: Dr. Tonie Ibarra  Has the plan of care been signed (Y/N):        [x]  Yes  []  No     Date of Patient follow up with Physician:3-4 months     MEDICARE CAP EXCEPTION DOCUMENTATION  I certify that this patient meets one of the below criteria necessary for becoming an exception to the Medicare cap on therapy services:    []  The patient has a condition identified by an ICD-10 code that has a direct and significant impact on the need for therapy. (Significantly impacts the rate of recovery.)                   []  The patient has a complexity identified by an ICD-10 code that has a direct and significant impact on the need for therapy. (Significantly impacts the rate of recovery and is associated with a primary condition.)         []  The patient has associated variables that influence the amount of treatment to include:  Social support, self-efficacy/motivation, prognosis, time since onset/acuity. [x]  The patient has generalized musculoskeletal conditions or a condition affecting multiple sites that will have a direct impact on the rate of recovery.     []  The patient had a prior episode of outpatient therapy during this calendar year for a different condition. []  The patient has a mental or cognitive disorder in addition to the condition being treated that will have a direct and significant impact on the rate of recovery. Is this a Progress Report:     []  Yes  [x]  No      If Yes:  Date Range for reporting period:  Beginning: 10-  Ending:    Progress report will be due (10 Rx or 30 days whichever is less):   36-    Recertification will be due (POC Duration  / 90 days whichever is less): 1-     Visit # Insurance Allowable Auth Required   In-person 12 BMN []  Yes [x]  No    Telehealth   []  Yes []  No    Total        Functional Scale: LEFS=   41 %   Date assessed:      11-      Therapy Diagnosis/Practice Pattern: 4 H     Number of Comorbidities:  []0     []1-2    [x]3+    Latex Allergy:  [x]NO      []YES  Preferred Language for Healthcare:   [x]English       []other:      Pain level:    0/10  Left hip (buttocks)   (Lat Approach) and L>R Knees                          5-10/10 left knee pain    SUBJECTIVE:Patient states she had bone scan and will see Dr. Neri Serra tomorrow to discuss results. Left knee pain is limiting factor in gait independence off AD. Left LE feels short. Used left insert for a few days,but plans to get one with better fit. OBJECTIVE:   Patient advised to try soft shoe insert in right shoe this week.  Observation: Pt no longer has true Trendelenburg, but does still have lateral list due to small leg length discrepancy. Patient encouraged to wear soft shoe insert on left when ambulating   Test measurements:      RESTRICTIONS/PRECAUTIONS: Lateral THR Precautions. H/O Bilat TKR,  Coccyx open ulcer being RX'd by Dr. Major Estrada. Pt has DM.     Exercises/Interventions:     Therapeutic Ex (88746) Sets/sec Reps Notes/CUES   Bike  5 min     Standing Gastroc Stretch on Incline H30x4     Mini Wall Slides w/ WA Abd H10x10 Monster Walk FWD & BWD @ half Wall YTB @ knees  2 laps     Leg Press 70# 3x10     Trustretch HS Stretch H30x3     Standing HR @ bar     Equip HS Curls 30# 3x10     Mat's Edge Hip Extension w/ TrA 1.5# H2 2x10     PATTI Hip Abd 45# 2x10B     Standing Hip Adductor Stretch @ bar H30x3     Seated SAQ 3# H3 2x10     Supine Heel slides with SB  H5x15  hep   HL TrA w/ Add ball Squeeze H10x10  hep   SB Bridge 5\" 2x10   Hep   Clamshells 2# 3\"  2x10      Supine Psoas Release/Quad Stretch w/ strap H30x3     SLR w/ Q set 2x10     Bridge with hip abd with TN  3\" 2x10                  Manual Intervention (86107)      Supine: quad & hip flexor rolling & STM 7 min     SL w/ 2 pillows @ knees: IT Band and gluteal rolling followed by gentle hip flexor stretching 7 min                             NMR re-education (68941)   CUES NEEDED    LSU w/ hip abd 4\" step  2x10         Airex FSU w/ Contra Hip Hike and step back 2xB     Airdisc Balance  & EC H5x10                        Therapeutic Activity (63399)                                                Therapeutic Exercise and NMR EXR  [x] (50678) Provided verbal/tactile cueing for activities related to strengthening, flexibility, endurance, ROM for improvements in LE, proximal hip, and core control with self care, mobility, lifting, ambulation.  [] (82394) Provided verbal/tactile cueing for activities related to improving balance, coordination, kinesthetic sense, posture, motor skill, proprioception  to assist with LE, proximal hip, and core control in self care, mobility, lifting, ambulation and eccentric single leg control.      NMR and Therapeutic Activities:    [x] (68596 or 48266) Provided verbal/tactile cueing for activities related to improving balance, coordination, kinesthetic sense, posture, motor skill, proprioception and motor activation to allow for proper function of core, proximal hip and LE with self care and ADLs  [] (90239) Gait Re-education- Provided training and instruction to the patient for proper LE, core and proximal hip recruitment and positioning and eccentric body weight control with ambulation re-education including up and down stairs     Home Exercise Program:    [x] (06169) Reviewed/Progressed HEP activities related to strengthening, flexibility, endurance, ROM of core, proximal hip and LE for functional self-care, mobility, lifting and ambulation/stair navigation   [] (18167)Reviewed/Progressed HEP activities related to improving balance, coordination, kinesthetic sense, posture, motor skill, proprioception of core, proximal hip and LE for self care, mobility, lifting, and ambulation/stair navigation      Manual Treatments:  PROM / STM / Oscillations-Mobs:  G-I, II, III, IV (PA's, Inf., Post.)  [x] (30238) Provided manual therapy to mobilize LE, proximal hip and/or LS spine soft tissue/joints for the purpose of modulating pain, promoting relaxation,  increasing ROM, reducing/eliminating soft tissue swelling/inflammation/restriction, improving soft tissue extensibility and allowing for proper ROM for normal function with self care, mobility, lifting and ambulation. Modalities:     Declined; will apply CP at home if needed. [] GAME READY (VASO)- for significant edema, swelling, pain control. Charges:  Timed Code Treatment Minutes: 55   Total Treatment Minutes: 55     [] EVAL (LOW) 86382   [] EVAL (MOD) 43893  [] EVAL (HIGH) 42054   [] RE-EVAL     [x] VD(17477) x 1    [] IONTO  [x] NMR (07530) x 1   [] VASO  [x] Manual (98432) x1      [] Other:CP  [] TA x      [] Mech Traction (35621)  [] ES(attended) (52309)      [] ES (un) (97544):       GOALS:   Short Term Goals: To be achieved in: 2 weeks  1. Independent in HEP and progression per patient tolerance, in order to prevent re-injury. []? Progressing: [x]? Met: []? Not Met: []? Adjusted  2.  Patient will have a decrease in pain to facilitate improvement in movement, function, and ADLs as indicated by Functional Deficits. []? Progressing: [x]? Met:for left hip but not left knee []? Not Met: []? Adjusted     Long Term Goals: To be achieved in: 12 weeks  1. Disability index score of 40 % or less for the LEFS to assist with reaching prior level of function. [x]? Progressing: to 41% on 11-  []? Met: []? Not Met: []? Adjusted  2. Patient will demonstrate increased AROM left hip to neutral extension to allow for proper joint functioning as indicated by patients Functional Deficits. []? Progressing: [x]? Met: []? Not Met: []? Adjusted  3. Patient will demonstrate an increase in Strength to good proximal hip strength and control, within 5lb HHD in LE to allow for proper functional mobility as indicated by patients Functional Deficits. [x]? Progressing: []? Met: []? Not Met: []? Adjusted  4. Patient will ascend and descend a flight of stairs using handrails without increased symptoms or restriction. [x]? Progressing:Limited by.left knee pain []? Met: []? Not Met: []? Adjusted  5. Walk community distances w/o AD and good gait pattern. [x]? Progressing:Limited by left knee pain. []? Met: []? Not Met: []? Adjusted     Overall Progression Towards Functional goals/ Treatment Progress Update:  [x] Patient is progressing as expected towards functional goals listed. [] Progression is slowed due to complexities/Impairments listed. [] Progression has been slowed due to co-morbidities.   [] Plan just implemented, too soon to assess goals progression <30days   [] Goals require adjustment due to lack of progress  [] Patient is not progressing as expected and requires additional follow up with physician  [x] Other: progress is limited by left knee pain    Prognosis for POC: [x] Good [] Fair  [] Poor      Patient requires continued skilled intervention: [x] Yes  [] No    Treatment/Activity Tolerance:  [x] Patient able to complete treatment  [] Patient limited by fatigue  [x] Patient limited by pain/ left knee    []

## 2020-12-04 ENCOUNTER — OFFICE VISIT (OUTPATIENT)
Dept: ORTHOPEDIC SURGERY | Age: 70
End: 2020-12-04
Payer: MEDICARE

## 2020-12-04 VITALS — HEIGHT: 62 IN | WEIGHT: 228 LBS | BODY MASS INDEX: 41.96 KG/M2

## 2020-12-04 PROCEDURE — MISCD282 ADJUSTA LIFT: Performed by: ORTHOPAEDIC SURGERY

## 2020-12-04 PROCEDURE — 99214 OFFICE O/P EST MOD 30 MIN: CPT | Performed by: ORTHOPAEDIC SURGERY

## 2020-12-04 NOTE — PROGRESS NOTES
CHIEF COMPLAINT:    Chief Complaint   Patient presents with    Knee Pain     CK LEFT KNEE BONE SCAN RESULTS       HISTORY OF PRESENT ILLNESS:                The patient is a 65 y.o. female who returns to clinic to review her bone scan results. This is the woman who recently underwent a left total hip replacement. She has been struggling with bilateral knee pain that has been gradually worsening. She complains of pain in the left knee greater than the right. She does feel some symptoms of instability in the knee. She does also have a mild leg length discrepancy with the left leg being longer than the right. She has not yet been able to find a proper shoe lift to help correct this.     Past Medical History:   Diagnosis Date    Atrial fibrillation ([address]     Diabetes mellitus ([address]     Hyperlipidemia     Hypertension     Olecranon bursitis, left elbow 4/13/2017    FADI (obstructive sleep apnea)     CAN'T TOLERATE DEVICE        Work Status: Retired    The pain assessment was noted & is as follows:  Pain Assessment  Location of Pain: Knee  Location Modifiers: Left  Severity of Pain: 6  Quality of Pain: Aching, Dull, Sharp  Duration of Pain: Persistent  Frequency of Pain: Intermittent  Aggravating Factors: Stairs, Walking, Bending, Stretching  Limiting Behavior: Some  Relieving Factors: Rest]      Work Status/Functionality:     Past Medical History: Medical history form was reviewed today & can be found in the media tab  Past Medical History:   Diagnosis Date    Atrial fibrillation ([address]     Diabetes mellitus ([address]     Hyperlipidemia     Hypertension     Olecranon bursitis, left elbow 4/13/2017    FADI (obstructive sleep apnea)     CAN'T TOLERATE DEVICE      Past Surgical History:     Past Surgical History:   Procedure Laterality Date    COLONOSCOPY      EYE SURGERY Bilateral     cataracts    FINGER TRIGGER RELEASE Right 10/04/2017    long    HYSTERECTOMY      JOINT REPLACEMENT Bilateral bilateral knee replacements    OTHER SURGICAL HISTORY  11/12/2014    phacoemulsification of cataract with intraocular lens implant right eye    ROTATOR CUFF REPAIR Bilateral     bilateral rotator cuff repair    TONSILLECTOMY      TOTAL HIP ARTHROPLASTY Left 9/24/2020    LEFT TOTAL HIP REPLACEMENT WITH CELLSAVER      TORRES & NEPHEW performed by Marco Dena MD at [address]       Current Medications:     Current Outpatient Medications:     amoxicillin (AMOXIL) 500 MG capsule, Take 4 capsules 1 hour prior to dental procedure, Disp: 12 capsule, Rfl: 0    dilTIAZem (CARDIZEM CD) 120 MG extended release capsule, Take 1 capsule by mouth daily, Disp: 90 capsule, Rfl: 3    glipiZIDE (GLUCOTROL XL) 5 MG extended release tablet, Take 5 mg by mouth daily, Disp: , Rfl:     Cholecalciferol (VITAMIN D-3) 25 MCG (1000 UT) CAPS, Take by mouth daily, Disp: , Rfl:     Probiotic Product (PROBIOTIC PO), Take 1 tablet by mouth daily, Disp: , Rfl:     hydrochlorothiazide (HYDRODIURIL) 25 MG tablet, Take 1 tablet by mouth daily, Disp: 90 tablet, Rfl: 3    atorvastatin (LIPITOR) 40 MG tablet, Take 40 mg by mouth nightly , Disp: , Rfl:     warfarin (COUMADIN) 2 MG tablet, Take 2 tablets by mouth daily, Disp: 60 tablet, Rfl: 0    metFORMIN (GLUCOPHAGE) 500 MG tablet, Take 1,000 mg by mouth 2 times daily (with meals) , Disp: , Rfl:     losartan (COZAAR) 100 MG tablet, Take 100 mg by mouth daily. , Disp: , Rfl:   Allergies:  Furosemide; Hydrocodone-acetaminophen; Sitagliptin; and Morphine  Social History:    reports that she has never smoked. She has never used smokeless tobacco. She reports that she does not drink alcohol or use drugs. Family History:   Family History   Problem Relation Age of Onset    Cancer Mother         lung    Cancer Father         lung       REVIEW OF SYSTEMS:   For new problems, a full review of systems will be found scanned in the patient's chart.   CONSTITUTIONAL: Denies unexplained weight loss, fevers, chills   NEUROLOGICAL: Denies unsteady gait or progressive weakness  SKIN: Denies skin changes, delayed healing, rash, itching       PHYSICAL EXAM:    Vitals: Height 5' 2.01\" (1.575 m), weight 228 lb (103.4 kg). GENERAL EXAM:  · General Apparence: Patient is adequately groomed with no evidence of malnutrition. · Orientation: The patient is oriented to time, place and person. · Mood & Affect:The patient's mood and affect are appropriate       Left knee PHYSICAL EXAMINATION:  · Inspection: No visible deformity. Old, well-healed surgical incision is noted    · Palpation: Tenderness to palpation primarily along the medial and posterior aspects of the knee    · Range of Motion: Range of motion is not significantly limited. Limited only in flexion by body habitus    · Strength: No strength deficits    · Special Tests: No ligamentous laxity. Negative Homans testing. · Skin:  There are no rashes, ulcerations or lesions. · There are no dysvascular changes     Gait & station: Mildly antalgic      Additional Examinations:        Right Lower Extremity: Examination of the right lower extremity does not show any tenderness, deformity or injury. Range of motion is unremarkable. There is no gross instability. There are no rashes, ulcerations or lesions. Strength and tone are normal.      Diagnostic Testing:  Bone scan    1. Status post bilateral knee arthroplasty.  Mild nonspecific inflammation is    seen about each knee, right greater than left.  Relatively symmetric delayed    phase activity about the arthroplasties can be due to bony remodeling,    without marked asymmetry to suggest derangement. Orders     Orders Placed This Encounter   Procedures    [employer] Lift $10     Patient was supplied an [address] [place] [address]. This retail item was supplied to provide functional support and assist in protecting the affected area.       Verbal and written instructions for the

## 2020-12-08 ENCOUNTER — HOSPITAL ENCOUNTER (OUTPATIENT)
Dept: PHYSICAL THERAPY | Age: 70
Setting detail: THERAPIES SERIES
Discharge: HOME | End: 2020-12-08
Payer: MEDICARE

## 2020-12-08 PROCEDURE — 97140 MANUAL THERAPY 1/> REGIONS: CPT

## 2020-12-08 PROCEDURE — 97112 NEUROMUSCULAR REEDUCATION: CPT

## 2020-12-08 PROCEDURE — 97110 THERAPEUTIC EXERCISES: CPT

## 2020-12-08 NOTE — FLOWSHEET NOTE
[facility], [address] [place]  Phone: [phone]  Fax [phone]    Physical Therapy Treatment Note/ Progress Report:           Date:  2020    Patient Name:  Eduardo Brooks    :  1950  MRN: [id number]  Restrictions/Precautions:  R & L TKR ( & ), 2 RTC SX, Lateral Left THR Precautions, Coccyx Ulcer, DM, HTN, HLD, Obesity, Atrial Fib, FADI. Medical/Treatment Diagnosis Information:  Diagnosis: Z96.642 S/P Left Lateral THR (DOS 2020)  Treatment Diagnosis: M25.552 Left Hip Pain, M25.652 Left Hip Stiffness, M62.81 Core & Left LE Weakness, R26.2 Difficulty walking, L89.159 Coccyx Ulcer  Insurance/Certification information:  [facility]   ($[facility]CP, 100%, BMN, no auth)  Physician Information:  Referring Practitioner: Dr. Kiko Paiz  Has the plan of care been signed (Y/N):        [x]  Yes  []  No     Date of Patient follow up with Physician:3-4 months     MEDICARE CAP EXCEPTION DOCUMENTATION  I certify that this patient meets one of the below criteria necessary for becoming an exception to the Medicare cap on therapy services:    []  The patient has a condition identified by an ICD-10 code that has a direct and significant impact on the need for therapy. (Significantly impacts the rate of recovery.)                   []  The patient has a complexity identified by an ICD-10 code that has a direct and significant impact on the need for therapy. (Significantly impacts the rate of recovery and is associated with a primary condition.)         []  The patient has associated variables that influence the amount of treatment to include:  Social support, self-efficacy/motivation, prognosis, time since onset/acuity. [x]  The patient has generalized musculoskeletal conditions or a condition affecting multiple sites that will have a direct impact on the rate of recovery.     []  The patient had a prior episode of outpatient therapy during this calendar year for a different condition. []  The patient has a mental or cognitive disorder in addition to the condition being treated that will have a direct and significant impact on the rate of recovery. Is this a Progress Report:     []  Yes  [x]  No      If Yes:  Date Range for reporting period:  Beginning: 10-  Ending:    Progress report will be due (10 Rx or 30 days whichever is less):   13-    Recertification will be due (POC Duration  / 90 days whichever is less): 1-     Visit # Insurance Allowable Auth Required   In-person 13 BMN []  Yes [x]  No    Telehealth   []  Yes []  No    Total        Functional Scale: LEFS=   41 %   Date assessed:      11-      Therapy Diagnosis/Practice Pattern: 4 H     Number of Comorbidities:  []0     []1-2    [x]3+    Latex Allergy:  [x]NO      []YES  Preferred Language for Healthcare:   [x]English       []other:      Pain level:    0/10  Left hip (buttocks)   (Lat Approach) and L>R Knees                          5-10/10 left knee pain    SUBJECTIVE: Pt states that she had F/u w/ Dr. Kobe Joe yesterday. Bone scan reviewed and fitted w/ new shoe lift. Dr. Kobe Joe told patient that he was pleased w/ her progress andshe could now DC PT and continue w/ HEP on her own. OBJECTIVE: See Discharge Summary. Consolidated HEP. Reviewed all HEP TE and patient given new handout and TB.   Observation: Decrease lateral listing of trunk w/ new shoe lift. Patient able to walk with improved gait when wearing lift.  Test measurements:      RESTRICTIONS/PRECAUTIONS: Lateral THR Precautions. H/O Bilat TKR,  Coccyx open ulcer being RX'd by Dr. Helen Soares. Pt has DM.     Exercises/Interventions:     Therapeutic Ex (99029) Sets/sec Reps Notes/CUES   Bike  5 min     Standing Gastroc Stretch on Incline H30x4     Mini Wall Slides w/ NV Abd H10x10     Monster Walk FWD & BWD @ half Wall YTB @ knees  2 laps or ) Provided verbal/tactile cueing for activities related to improving balance, coordination, kinesthetic sense, posture, motor skill, proprioception and motor activation to allow for proper function of core, proximal hip and LE with self care and ADLs  [] (01186) Gait Re-education- Provided training and instruction to the patient for proper LE, core and proximal hip recruitment and positioning and eccentric body weight control with ambulation re-education including up and down stairs     Home Exercise Program:    [x] (86733) Reviewed/Progressed HEP activities related to strengthening, flexibility, endurance, ROM of core, proximal hip and LE for functional self-care, mobility, lifting and ambulation/stair navigation   [] (00419)Reviewed/Progressed HEP activities related to improving balance, coordination, kinesthetic sense, posture, motor skill, proprioception of core, proximal hip and LE for self care, mobility, lifting, and ambulation/stair navigation      Manual Treatments:  PROM / STM / Oscillations-Mobs:  G-I, II, III, IV (PA's, Inf., Post.)  [x] (92862) Provided manual therapy to mobilize LE, proximal hip and/or LS spine soft tissue/joints for the purpose of modulating pain, promoting relaxation,  increasing ROM, reducing/eliminating soft tissue swelling/inflammation/restriction, improving soft tissue extensibility and allowing for proper ROM for normal function with self care, mobility, lifting and ambulation. Modalities:     Declined; will apply CP at home if needed. [] GAME READY (VASO)- for significant edema, swelling, pain control.      Charges:  Timed Code Treatment Minutes: 55   Total Treatment Minutes: 55     [] EVAL (LOW) 04363   [] EVAL (MOD) 76792  [] EVAL (HIGH) 22562   [] RE-EVAL     [x] CA(29020) x 2    [] IONTO  [x] NMR (36579) x 1   [] VASO  [x] Manual (34130) x1      [] Other:CP  [] TA x      [] Mech Traction (83578)  [] ES(attended) (44600)      [] ES (un) (47041):       GOALS: Short Term Goals: To be achieved in: 2 weeks  1. Independent in HEP and progression per patient tolerance, in order to prevent re-injury. []? Progressing: [x]? Met: []? Not Met: []? Adjusted  2. Patient will have a decrease in pain to facilitate improvement in movement, function, and ADLs as indicated by Functional Deficits. []? Progressing: [x]? Met:for left hip but not left knee []? Not Met: []? Adjusted     Long Term Goals: To be achieved in: 12 weeks  1. Disability index score of 40 % or less for the LEFS to assist with reaching prior level of function. [x]? Progressing: to 41% on 11-  [x]? Met:38% on 12-8-2020 []? Not Met: []? Adjusted  2. Patient will demonstrate increased AROM left hip to neutral extension to allow for proper joint functioning as indicated by patients Functional Deficits. []? Progressing: [x]? Met: []? Not Met: []? Adjusted  3. Patient will demonstrate an increase in Strength to good proximal hip strength and control, within 5lb HHD in LE to allow for proper functional mobility as indicated by patients Functional Deficits. [x]? Progressing: []? Met: []? Not Met: []? Adjusted  4. Patient will ascend and descend a flight of stairs using handrails without increased symptoms or restriction. [x]? Progressing:Limited by.left knee pain []? Met: [x]? Not Met: []? Adjusted  5. Walk community distances w/o AD and good gait pattern. [x]? Progressing:Limited by left knee pain. []? Met: [x]? Not Met: []? Adjusted     Overall Progression Towards Functional goals/ Treatment Progress Update:  [] Patient is progressing as expected towards functional goals listed. [] Progression is slowed due to complexities/Impairments listed. [] Progression has been slowed due to co-morbidities.   [] Plan just implemented, too soon to assess goals progression <30days   [] Goals require adjustment due to lack of progress  [] Patient is not progressing as expected and requires additional follow up with physician  [x] Other: Patient is indep in HEP and ready to continue her TE on her own @ home. Prognosis for POC: [x] Good [] Fair  [] Poor      Patient requires continued skilled intervention: [x] Yes  [] No    Treatment/Activity Tolerance:  [x] Patient able to complete treatment  [] Patient limited by fatigue  [x] Patient limited by pain/ left knee    [] Patient limited by other medical complications  [] Other:     ASSESSMENT: Pt now able to walk short distances indoors safely w/o AD, but continues to require SC outdoor in community for safety due to left knee pain and instability. PLAN  [] Continue per plan of care [] Alter current plan (see comments above)  [] Plan of care initiated [] Hold pending MD visit [x] Discharge      Electronically signed by:  Issac Boxer, [place], [id number]    Note: If patient does not return for scheduled/ recommended follow up visits, this note will serve as a discharge from care along with most recent update on progress.

## 2020-12-08 NOTE — DISCHARGE SUMMARY
[facility],  [address]  Phone: [phone]  Fax [phone]       Physical Therapy Discharge  Date: 2020        Patient Name:  Elo Mishra    :  1950  MRN: [id number]  Referring Physician:Dr. Deb Watson  Diagnosis: G14.038 S/P Left Lateral THR (DOS 2020)                       ICD Code:Z96.642, M25.552, M25.652, M62.81, R26.2, L89.159  Therapy Diagnosis/Practice Pattern: 4H    Total number of visits: 13  Reporting Period:   Beginning Date: 10-   End Date: 2020    OBJECTIVE  Test used Initial score Current Score   Pain Summary 0-10 5/10 Left hip 0/10 Left Hip   5/10 Left knee   Functional questionnaire LEFS 76% 38%   Functional Testing TUG  10.1 w/o SC    SLS  L= 5sec  R= 7 sec    30 Sec Sit-Stand  10 times w/ SC;  Limited by left knee pain   ROM  Left Hip flex 90 deg 120 deg    abd 28 deg 63 deg    ext -15 deg +5 deg                     Left knee flex 92 deg 98 deg    ext 0 0         Strength  Left Hip flex -3/5     abd -3/5 L= 23.7  lb  R= 31.0 lb                          Left knee ext L= 41.6 lb  R= 55.0 lb L= 58.9 lb  R=59.8  lb               Functional Limitation G-Code (if applicable):    LEFS= 20%         Test/tests used to determine % limitation: LEFS  Actual Score used to drive % limitation:  76%    Treatment to date:  [x] Therapeutic Exercise    [x] Modalities:  [] Therapeutic Activity             []Ultrasound            [x]Electrical Stimulation  [x] Gait Training     []Cervical Traction    [] Lumbar Traction  [x] Neuromuscular Re-education [x] Cold/hotpack         []Iontophoresis  [x] Instruction in HEP      Other:  [x] Manual Therapy                   []      Treatment to date:  [] Therapeutic Exercise    [] Modalities:  [] Therapeutic Activity             []Ultrasound            []Electrical Stimulation  [] Gait Training     []Cervical Traction    [] Lumbar Traction  [] Neuromuscular Re-education [] Cold/hotpack         []Iontophoresis  [] Instruction in HEP      Other:  [] Manual Therapy                   []                                  []   Assessment:   [] All Goals were achieved.  [x] The following goals were achieved (#'s):  1. Disability index score of 40 % or less for the LEFS to assist with reaching prior level of function.   []?? Progressing: to 41% on 11-  [x]? ? Met:38% on 12-8-2020 []? ? Not Met: []?? Adjusted  2. Patient will demonstrate increased AROM left hip to neutral extension to allow for proper joint functioning as indicated by patients Functional Deficits. []?? Progressing: [x]? ? Met: []?? Not Met: []?? Adjusted     [x] The following goals were not achieved for the following reasons:/assessmen of improvement as it relates to each goal:  3. Patient will demonstrate an increase in Strength to good proximal hip strength and control, within 5lb HHD in LE to allow for proper functional mobility as indicated by patients Functional Deficits. [x]? ? Progressing: []?? Met: [x]? ? Not Met: []?? Adjusted  4. Patient will ascend and descend a flight of stairs using handrails without increased symptoms or restriction. [x]? ? Progressing:Limited by.left knee pain []? ? Met: [x]? ? Not Met: []?? Adjusted  5. Walk community distances w/o AD and good gait pattern. [x]? ? Progressing:Limited by left knee pain. []?? Met: [x]? ? Not Met: []?? Adjusted       Plan of Care:  [x] Discharge from Therapy Services due to: Pt is indep in [facility] and was discharged from PT by Dr. Aide Thompson. Reason for Discharge:   [] All goals achieved    [] Patient having surgery  [x] Physician discontinued therapy  [] Insurance/Financial Limitations [] Patient did not return for follow up visits [] Home program/1 visit only   [] No subjective or objective improvement [] Plateaued   [] Patient was unable to adhere to the plan of care established at evaluation.  [] Referred back to physician for re-evaluation and did not return.      [] Other:       Electronically signed by:  Seda Linda

## 2020-12-10 ENCOUNTER — APPOINTMENT (OUTPATIENT)
Dept: PHYSICAL THERAPY | Age: 70
End: 2020-12-10
Payer: MEDICARE

## 2020-12-15 ENCOUNTER — APPOINTMENT (OUTPATIENT)
Dept: PHYSICAL THERAPY | Age: 70
End: 2020-12-15
Payer: MEDICARE

## 2020-12-17 ENCOUNTER — APPOINTMENT (OUTPATIENT)
Dept: PHYSICAL THERAPY | Age: 70
End: 2020-12-17
Payer: MEDICARE

## 2020-12-22 ENCOUNTER — APPOINTMENT (OUTPATIENT)
Dept: PHYSICAL THERAPY | Age: 70
End: 2020-12-22
Payer: MEDICARE

## 2020-12-24 ENCOUNTER — APPOINTMENT (OUTPATIENT)
Dept: PHYSICAL THERAPY | Age: 70
End: 2020-12-24
Payer: MEDICARE

## 2020-12-29 ENCOUNTER — APPOINTMENT (OUTPATIENT)
Dept: PHYSICAL THERAPY | Age: 70
End: 2020-12-29
Payer: MEDICARE

## 2020-12-31 ENCOUNTER — APPOINTMENT (OUTPATIENT)
Dept: PHYSICAL THERAPY | Age: 70
End: 2020-12-31
Payer: MEDICARE

## 2021-01-13 ENCOUNTER — OFFICE VISIT (OUTPATIENT)
Dept: ORTHOPEDIC SURGERY | Age: 71
End: 2021-01-13

## 2021-01-13 VITALS — WEIGHT: 228 LBS | BODY MASS INDEX: 41.96 KG/M2 | HEIGHT: 62 IN

## 2021-01-13 DIAGNOSIS — Z96.642 STATUS POST TOTAL HIP REPLACEMENT, LEFT: Primary | ICD-10-CM

## 2021-01-13 PROCEDURE — 99024 POSTOP FOLLOW-UP VISIT: CPT | Performed by: PHYSICIAN ASSISTANT

## 2021-02-03 ENCOUNTER — TELEPHONE (OUTPATIENT)
Dept: CARDIOLOGY CLINIC | Age: 71
End: 2021-02-03

## 2021-02-03 NOTE — TELEPHONE ENCOUNTER
I'm sorry she's not feeling well. I'd be happy to see her on the 02/09/2021. Ok to [employer], thanks.

## 2021-02-03 NOTE — TELEPHONE ENCOUNTER
At pt last OV AGK switched pt from flecainide (TAMBOCOR) to dilTIAZem (CARDIZEM CD). Pt states she forgot what what [facility] [address] told her about when to take Cardizem. She started out taking in the AM then read her AVS and switched to PM like paper work stated. She has been taking for 12 days at bedtime now. But since starting Cardizem she has noticed that she is having a lot more A-fib and it is lasting longer each time. Pt has appt with NPBB in March and wants to know should she continue medication till she sees NPBB, or see [facility],[address] sooner. Please advise.

## 2021-02-09 ENCOUNTER — OFFICE VISIT (OUTPATIENT)
Dept: CARDIOLOGY CLINIC | Age: 71
End: 2021-02-09
Payer: MEDICARE

## 2021-02-09 VITALS
HEART RATE: 72 BPM | WEIGHT: 228 LBS | BODY MASS INDEX: 41.96 KG/M2 | HEIGHT: 62 IN | SYSTOLIC BLOOD PRESSURE: 118 MMHG | DIASTOLIC BLOOD PRESSURE: 60 MMHG

## 2021-02-09 DIAGNOSIS — I10 ESSENTIAL HYPERTENSION: ICD-10-CM

## 2021-02-09 DIAGNOSIS — I48.0 PAROXYSMAL ATRIAL FIBRILLATION (HCC): Primary | ICD-10-CM

## 2021-02-09 PROCEDURE — 99214 OFFICE O/P EST MOD 30 MIN: CPT | Performed by: INTERNAL MEDICINE

## 2021-02-09 PROCEDURE — 93000 ELECTROCARDIOGRAM COMPLETE: CPT | Performed by: INTERNAL MEDICINE

## 2021-02-09 NOTE — LETTER
[address]   Electrophysiology Note      Date: 2/9/21  Patient Name: Monae Lyn  YOB: 1950     Chief Complaint: Irregular Heart Beat (2/3/21 had an episode that lasted several hours) and Atrial Fibrillation    Primary Care Physician: Dot Lau DO     HISTORY OF PRESENT ILLNESS: Monae Lyn is a 65 y.o. female who presents for follow up for paroxysmal atrial fibrillation. She has a past medical history of PAF, FADI, HTN, and DM. She wore a 24 HR Cardiac Event Monitor in 8/2013 with avg HR 69 () that demonstrated SR with PAC's and PVC's. She has been taking flecainide for PAF. She briefly stopped taking flecainide in 6/2016 due to cost but restarted due to palpitations. Her primary care physician manages her coumadin and her INR. She underwent an echocardiogram on 7/21/20 that demonstrated an EF of 55% with no regional wall motion abnormalities, mild AS, AR, MR and RI, LA normal in size. At her 11/10/20 OV, she presented using a dockeyr and had recent hip surgery. She reported she occasionally feels palpitations that are very brief and not bothersome. She reported her coumadin is managed by her PCP and she reports she is usually therapeutic. She reported she is increasing her activity as tolerated due to her recent hip surgery. She reported she is tolerating her activity well. Her flecainide was discontinued and she was started on Cardizem 120mg nightly. Interval History Since Last Office Visit:   Patient called in 2/3/2021 to report that she felt that her AF had returned and was lasting much longer in duration. She reported that she was taking cardizem in the morning two weeks after he last visit until she read her AVS and the directions on the medication said take cardizem at night. She then started to take it at night. Today, 2/9/2021, patient's ECG demonstrates junctional rhythm at 74 BPM. She reports on 2/3/2021 she believes she went into AF early in the morning and it lasted for several hours. She reports she took her vitals and they were 216/181 with a HR in the 80s. She reports she thinks her BP cuff gave her an inaccurate reading and is going to replace it soon. She reports her BP at home today was 118/66, pulse 70 and she was feeling well. She reports she bought stationary bicycle pedals that she plans on using for 30 minutes several times per day. She just put it together yesterday. She reports she is taking all medications as prescribed and tolerates them well. She denies bleeding or bruising issues. Patient denies current chest pain, sob, palpitations, dizziness or syncope. Past Medical History:   has a past medical history of Atrial fibrillation ([address] Diabetes mellitus ([address] Hyperlipidemia, Hypertension, Olecranon bursitis, left elbow, and FADI (obstructive sleep apnea). Past Surgical History:   has a past surgical history that includes Tubal ligation; Hysterectomy; joint replacement (Bilateral); Rotator cuff repair (Bilateral); other surgical history (11/12/2014); Finger trigger release (Right, 10/04/2017); eye surgery (Bilateral); Tonsillectomy; Colonoscopy; and Total hip arthroplasty (Left, 9/24/2020). Social History:   reports that she has never smoked. She has never used smokeless tobacco. She reports that she does not drink alcohol or use drugs. Family History: family history includes Cancer in her father and mother. Allergies:  Furosemide, Hydrocodone-acetaminophen, Sitagliptin, and Morphine    Home Medications:    Prior to Admission medications    Medication Sig Start Date End Date Taking?  Authorizing Provider   dilTIAZem (CARDIZEM CD) 120 MG extended release capsule Take 1 capsule by mouth daily 11/10/20  Yes Leonor Hernandez MD glipiZIDE (GLUCOTROL XL) 5 MG extended release tablet Take 5 mg by mouth daily   Yes Historical Provider, MD   Cholecalciferol (VITAMIN D-3) 25 MCG (1000 UT) CAPS Take by mouth daily   Yes Historical Provider, MD   hydrochlorothiazide (HYDRODIURIL) 25 MG tablet Take 1 tablet by mouth daily 10/2/18  Yes KIMO Bay CNP   atorvastatin (LIPITOR) 40 MG tablet Take 40 mg by mouth nightly  4/20/17  Yes Historical Provider, MD   warfarin (COUMADIN) 2 MG tablet Take 2 tablets by mouth daily 7/13/16  Yes KIMO Bay CNP   metFORMIN (GLUCOPHAGE) 500 MG tablet Take 1,000 mg by mouth 2 times daily (with meals)    Yes Historical Provider, MD   losartan (COZAAR) 100 MG tablet Take 100 mg by mouth daily. Yes Historical Provider, MD   amoxicillin (AMOXIL) 500 MG capsule Take 4 capsules 1 hour prior to dental procedure  Patient not taking: Reported on 2/9/2021 11/19/20   NOBLE Gutierrez   Probiotic Product (PROBIOTIC PO) Take 1 tablet by mouth daily    Historical Provider, MD       REVIEW OF SYSTEMS:      All 14-point review of systems are completed and pertinent positives are mentioned in the history of present illness. Other systems are reviewed and are negative. Physical Examination:    /60   Pulse 72   Ht 5' 2\" (1.575 m)   Wt 228 lb (103.4 kg)   BMI 41.70 kg/m²    Constitutional and General Appearance: alert, cooperative, no distress and appears stated age  [de-identified]: PERRL, no cervical lymphadenopathy. No masses palpable. Normal oral mucosa  Respiratory:  · Normal excursion and expansion without use of accessory muscles  · Resp Auscultation: Normal breath sounds without dullness or wheezing  Cardiovascular:  · The apical impulse is not displaced  · RRR S1S2 w/o M/G/R  Abdomen:  · No masses or tenderness  · Bowel sounds present  Extremities:  ·  No Cyanosis or Clubbing  ·  Lower extremity edema: No  · Skin: Warm and dry  Neurological:  · Alert and oriented. · Moves all extremities well  · No abnormalities of mood, affect, memory, mentation, or behavior are noted    DATA:    ECG 2021: Junctional rhythm, 74 BPM.     EC/10/20: SR with HR 87 BPM    ECHO: 20:  Summary   Normal left ventricle systolic function with an estimated ejection fraction   of 55%. No regional wall motion abnormalities are seen. Normal left ventricular diastolic filling pressure. The aortic valve appears mildly thickened/calcified with decreased leaflet   mobility. Mild aortic stenosis. Mild aortic regurgitation. Mild mitral and pulmonic regurgitation. Systolic pulmonary artery pressure (SPAP) is normal and estimated at 28 mmHg   (right atrial pressure 3 mmHg). LA normal size. ECHO: 17:  Summary   Technically difficult examination. Normal left ventricle systolic function with an estimated ejection fraction   of 55%. No regional wall motion abnormalities are seen. Normal left ventricular diastolic filling pressure. MIld pulmonic regurgitation. Systolic pulmonary artery pressure (SPAP) is normal and estimated at 26 mmHg   (RA pressure 3 mmHg). IMPRESSION:    1.  Paroxysmal Atrial Fibrillation ([id number])  11/10/2020 Mrs. Moustapha Caputo is a pleasant 65year old female with a medial history significant for paroxysmal atrial fibrillation, hypertension, and diabetes mellitus tyep II who presents from home to [facility] care. According to patient she continues to intermittent palpitations that were previously thought to be secondary to PACs and is during her paroxysmal atrial fibrillation she did not note any palpitations. She has been tolerating her medicine flecainide however is concerned about the price of the medication. She is not on diltiazem per notes because of her request.  Patient does not appear to be interested in more aggressive therapy as she is not really symptomatic from her atrial fibrillation. She did know when she was off of her flecainide she did seem to feel more palpitations. I think that while being on a 1C agent you should also be on a rate control agent. Given the cost issue and the lack of symptoms currently I think it be reasonable to stop the flecainide and start her on low-dose diltiazem at nighttime. I will have her start this and follow-up with nurse practitioner in a couple months see how she is feeling. If she is symptomatic but tolerating her diltiazem we will restart the flecainide. If her blood pressure is low we will stop her hydrochlorothiazide in favor of the diltiazem. - Stop flecainide.  - Start diltiazem. - Continue warfarin (managed by PCP).   - Follow up with EP NP in 2-3 months unless/until procedure/discussion required or PRN.    2/9/2021 Patient presents for follow-up. From what I can tell her atrial fibrillation is most likely triggered by her obstructive sleep apnea. Unfortunately she is unable to tolerate CPAP therapy. She has been having more more palpitations that she attributes to atrial fibrillation. We recently discontinued her flecainide because she did not think it was effective and was not having any atrial fibrillation. She now believes that her flecainide was somewhat effective and would like to restart. We will restart her flecainide at her previous dose of 50 mg twice a day with boluses up to 100 mg every 2 hours not to exceed 300 mg a day. If this does not work we will increase her flecainide to 100 mg twice a day with an additional 100 mg as needed for breakthrough atrial fibrillation. If she needs further control we will consider class III antiarrhythmic and or ablation. Patient agrees with plan. - Restart flecainide 50 mg BID with pill in pocket strategy, not to exceed more than 300 mg in 24 hour period. If this is not effective then we will increase flecainide to 100 mg BID. If not effective we could try 150 mg BID however would prefer to switch to class III agent or ablation. Baseline  ms off flecainide.  - Continue diltiazem 120 mg QHS. - EKG in 1 week. - Once patient feels better we will ask for a monitor for 1-2 weeks, she will let us know. - Follow up with me in 3 months. RECOMMENDATIONS:  1. Discussed at length treatment options for AF:    1. Stop diltiazem and restart flecainide at 100mg BID. Can take 100mg PRN for palpitations lasting more than 1 hour (we will call to correct this). 2. Ablation to burn the abnormal electrical activity within the heart. Discussed risks and benefits. 3. Medications for rhythm control (dronedarone, or dofetilide which requires hospital admission for three days). Discussed risks and benefits. 2. ECG next week due to restarting flecainide. 3. Call the office and let us know how you are doing. We would like for you to wear a 2 week cardiac event monitor to assess palpitations after a month of flecainide. 4. Follow up with [facility] in 3 months. QUALITY MEASURES  1. Tobacco Cessation Counseling: NA  2. Retake of BP if >140/90:   NA  3. Documentation to PCP/referring for new patient:  Sent to PCP at close of office visit  4. CAD patient on anti-platelet: NA  5. CAD patient on STATIN therapy:  Yes  6. Patient with CHF and aFib on anticoagulation:  Yes     All questions and concerns were addressed to the patient/family. Alternatives to my treatment were discussed. Dr. Maik Bello MD  Electrophysiology  [facility]. [place], [address]  Phone: [phone]  Fax: [phone]   2/9/2021     NOTE: This report was transcribed using voice recognition software. Every effort was made to ensure accuracy, however, inadvertent computerized transcription errors may be present. This note has been scribed in the presence of Palmer Bloom MD by Tony Back RN. The scribe's documentation has been prepared under my direction and personally reviewed by me in its entirety. I confirm that the note above accurately reflects all work, physical examination, the discussion of treatments and procedures, and medical decision making performed by me. Raza Sharif MD personally performed the services described in this documentation as scribed by nurse in my presence, and is both accurate and complete.     Electronically signed by Epi Gusman MD on 2/10/2021 at 9:16 AM

## 2021-02-09 NOTE — PATIENT INSTRUCTIONS
RECOMMENDATIONS:  1. Discussed at length treatment options for AF:    1. Stop diltiazem and restart flecainide at 100mg BID. Can take 100mg PRN for palpitations lasting more than 1 hour. 2. Ablation to burn the abnormal electrical activity within the heart. Discussed risks and benefits. 3. Medications for rhythm control (dronedarone, or dofetilide which requires hospital admission for three days). Discussed risks and benefits. 2. ECG next week due to restarting flecainide. 3. Call the office and let us know how you are doing. We would like for you to wear a 2 week cardiac event monitor to assess palpitations after a month of flecainide. 4. Follow up with [address] [place] [address] 200 in 3 months. Patient Education        dronedarone  Pronunciation:  chyna freed  Brand:  [place]  What is the most important information I should know about dronedarone? You should not use dronedarone if you have severe liver disease, if you are pregnant or breast-feeding, or if you have ever used amiodarone and then had liver or lung problems. You should not use dronedarone if you have a serious heart condition such as very slow heartbeats, \"sick sinus syndrome,\" or \"AV block\" (unless you have a pacemaker). Dronedarone can double your risk of death if you have certain heart conditions. You should not use this medicine if you have severe heart failure, if you were recently hospitalized for worsening heart failure symptoms, or if you have a \"permanent\" type of atrial fibrillation (this will be determined by your doctor). Check your pulse often, and tell your doctor right away if you notice an irregular rhythm. Tell your doctor about all your current medicines and any you start or stop using. Many drugs can interact with dronedarone, and some drugs should not be used together. Dronedarone can cause liver problems. Call your doctor at once if you have symptoms such as nausea, loss of appetite, unusual tiredness, dark urine, or yellowing of your skin or eyes. What is dronedarone? Dronedarone is a heart rhythm medicine that helps maintain normal heartbeats in certain people with life-threatening rhythm disorders of the atrium (the upper chambers of the heart that allow blood to flow into the heart). Dronedarone helps lower your risk of needing to be hospitalized for a heart rhythm disorder called atrial fibrillation. Dronedarone is for people who have had this disorder in the past, but now have normal heart rhythm. Dronedarone may also be used for purposes not listed in this medication guide. What should I discuss with my healthcare provider before taking dronedarone? You should not use dronedarone if you are allergic to it, or if:  · you have severe liver disease;  · you have a serious heart condition such as \"sick sinus syndrome,\" \"AV block\" (unless you have a pacemaker), or very slow heartbeats that have caused you to faint;  · you are pregnant or breast-feeding; or  · you used a medicine called amiodarone and then had lung problems or liver problems. Dronedarone is used to treat intermittent or \"temporary\" heart rhythm disorders. In some people with \"permanent\" atrial fibrillation, dronedarone increased the risk of stroke, hospitalization, and death. Dronedarone can double your risk of death if you have certain heart conditions. You should not use this medicine if:  · you have severe heart failure;  · you were recently hospitalized for worsening heart failure symptoms (shortness of breath, chest tightness, night-time breathing problems, swelling, rapid weight gain); or  · you have a \"permanent\" atrial fibrillation that cannot be changed back to a normal rhythm (this will be determined by your doctor). Some medicines can cause unwanted or dangerous effects when used with dronedarone. Your doctor may need to change your treatment plan if you use any of the following drugs:  · cyclosporine;  · ritonavir;  · other heart rhythm medicines;  · an antibiotic --azithromycin, clarithromycin, erythromycin, levofloxacin, moxifloxacin, pentamidine, telithromycin;  · antifungal medicine --ketoconazole, itraconazole, voriconazole;  · an antidepressant --amitriptyline, amoxapine, clomipramine, desipramine, doxepin, imipramine, maprotiline, mirtazapine, nefazodone, nortriptyline, protriptyline, trimipramine; or  · antipsychotic medicine --chlorpromazine, fluphenazine, perphenazine, prochlorperazine, promethazine, thioridazine, trifluoperazine. To make sure dronedarone is safe for you, tell your doctor if you have ever had:  · other heart problems;  · an electrolyte imbalance (such as low levels of potassium or magnesium in your blood); or  · liver or kidney disease. Do not use dronedarone if you are pregnant. It could harm the unborn baby or cause birth defects. Use effective birth control to prevent pregnancy while you are using this medicine and tell your doctor if you become pregnant. It is not known whether dronedarone passes into breast milk or if it could harm a nursing baby. You should not breast-feed while taking dronedarone. How should I take dronedarone? Follow all directions on your prescription label. Do not take this medicine in larger or smaller amounts or for longer than recommended. Dronedarone works best if you take it with your morning and evening meals. Your heart function may need to be checked using an electrocardiograph or ECG (sometimes called an EKG) every 3 months. This will help your doctor determine how long to treat you with dronedarone. Your liver and kidney function may also need to be checked. Check your own pulse often, and call your doctor right away if you notice an irregular rhythm. Use dronedarone regularly even if you feel fine or have no symptoms. Get your prescription refilled before you run out of medicine completely. You should not stop using dronedarone suddenly. Stopping suddenly may make your condition worse. Store at room temperature away from heat and moisture. What happens if I miss a dose? Skip the missed dose and take the next regularly scheduled dose. Do not use extra medicine to make up the missed dose. What happens if I overdose? Seek emergency medical attention or call the Poison Help line at 1-601.454.5193. What should I avoid while taking dronedarone? Grapefruit and grapefruit juice may interact with dronedarone and lead to unwanted side effects. Avoid the use of grapefruit products while taking dronedarone. What are the possible side effects of dronedarone? Get emergency medical help if you have signs of an allergic reaction: hives; difficult breathing; swelling of your face, lips, tongue, or throat. Call your doctor at once if you have:  · shortness of breath (even with mild exertion), swelling, rapid weight gain;  · chest pain, wheezing, trouble breathing, dry cough, or coughing up mucus;  · breathing problems while lying down trying to sleep;  · severe dizziness, fainting, fast or pounding heartbeats;  · slow heart rate, feeling like you might pass out;  · a new or a worsening irregular heartbeat pattern;  · kidney problems --little or no urination, swelling in your feet or ankles, feeling tired or short of breath; or  · liver problems --nausea, upper stomach pain, itching, unusual tiredness, loss of appetite, dark urine, sara-colored stools, jaundice (yellowing of the skin or eyes). Common side effects may include:  · stomach pain, indigestion, nausea, vomiting, diarrhea;  · feeling weak or tired; or  · skin rash, itching, or redness. This is not a complete list of side effects and others may occur. Call your doctor for medical advice about side effects. You may report side effects to FDA at 3-748-KZU-5307. What other drugs will affect dronedarone? Many drugs can interact with dronedarone. Not all possible interactions are listed here. Tell your doctor about all your current medicines and any you start or stop using, especially:  · digoxin;  · Campus's wort;  · a blood thinner (warfarin, Coumadin, Jantoven);  · heart or blood pressure medication;  · medicines to treat tuberculosis;  · medicine to prevent organ transplant rejection;  · seizure medicine; or  · \"statin\" cholesterol medication (Lipitor, Zocor, Vytorin, and others). This list is not complete and many other drugs can interact with dronedarone. This includes prescription and over-the-counter medicines, vitamins, and herbal products. Give a list of all your medicines to any healthcare provider who treats you. Where can I get more information? Your pharmacist can provide more information about dronedarone. Remember, keep this and all other medicines out of the reach of children, never share your medicines with others, and use this medication only for the indication prescribed. Every effort has been made to ensure that the information provided by [address] is accurate, up-to-date, and complete, but no guarantee is made to that effect. Drug information contained herein may be time sensitive. [facility] information has been compiled for use by healthcare practitioners and consumers in the [place] and therefore [facility] does not warrant that uses outside of the [place] are appropriate, unless specifically indicated otherwise. [facility]'s drug information does not endorse drugs, diagnose patients or recommend therapy. [facility]'s drug information is an informational resource designed to assist licensed healthcare practitioners in caring for their patients and/or to serve consumers viewing this service as a supplement to, and not a substitute for, the expertise, skill, knowledge and judgment of healthcare practitioners. The absence of a warning for a given drug or drug combination in no way should be construed to indicate that the drug or drug combination is safe, effective or appropriate for any given patient. [facility] does not assume any responsibility for any aspect of healthcare administered with the aid of information [facility] provides. The information contained herein is not intended to cover all possible uses, directions, precautions, warnings, drug interactions, allergic reactions, or adverse effects. If you have questions about the drugs you are taking, check with your doctor, nurse or pharmacist.  Copyright 0889-9631 [address]: 12.01. Revision date: 11/15/2017. Care instructions adapted under license by [facility] ([facility]). If you have questions about a medical condition or this instruction, always ask your healthcare professional. [place] [address] any warranty or liability for your use of this information.        Patient Education        dofetilide  Pronunciation:  pruitt FET i nicola  Brand:  Tikosyn What is the most important information I should know about dofetilide? You should not take dofetilide if you have severe kidney disease or a history of Long QT syndrome. Serious drug interactions can occur when certain medicines are used together with dofetilide. Tell each of your healthcare providers about all medicines you use now, and any medicine you start or stop using. You will need to spend at least 3 days in a hospital setting when you first start taking dofetilide. This is so your heart rhythm and kidney function can be monitored in case the medicine causes serious side effects. What is dofetilide? Dofetilide is a heart rhythm medicine, also called an antiarrhythmic. Dofetilide is used to help keep the heart beating normally in people with certain heart rhythm disorders of the atrium (the upper chambers of the heart that allow blood to flow into the heart). Dofetilide is used in people with atrial fibrillation or atrial flutter. Dofetilide may also be used for purposes not listed in this medication guide. What should I discuss with my health care provider before taking dofetilide? You should not take dofetilide if you are allergic to it, or if you have:  · severe kidney disease (or if you are on dialysis); or  · a history of Long QT syndrome. Some medicines can cause unwanted or dangerous effects when used with dofetilide, and should not be used at the same time. Your doctor may need to change your treatment plan if you use any of the following drugs:  · cimetidine;  · dolutegravir;  · ketoconazole;  · megestrol;  · prochlorperazine;  · trimethoprim (Proloprim, Trimpex, Bactrim, Septra);  · verapamil; or  · a diuretic (water pill) that contains hydrochlorothiazide (HCTZ), such as Accuretic, Aldactazide, Atacand HCT, Benicar HCT, Diovan HCT, Dyazide, Exforge HCT, Hyzaar, Lopressor HCT, Maxzide, Micardis HCT, Monopril HCT, Prinzide, Tekturna HCT, Vaseretic, and others. To make sure dofetilide is safe for you, tell your doctor if you have:  · heart disease, high blood pressure;  · liver or kidney disease;  · depression, mental illness;  · asthma or allergies;  · any active infection;  · skin problems; or  · an electrolyte imbalance (such as low levels of potassium or magnesium in your blood). It is not known whether this medicine will harm an unborn baby. Tell your doctor if you are pregnant or plan to become pregnant while using this medicine. It is not known whether dofetilide passes into breast milk or if it could harm a nursing baby. You should not breast-feed while you are using dofetilide. How should I take dofetilide? Dofetilide is available only from a hospital or specialty pharmacy. You will need to spend at least 3 days in a hospital setting when you first start taking dofetilide. This is so your heart rhythm and kidney function can be monitored in case the medicine causes serious side effects. Follow all directions on your prescription label. Do not take this medicine in larger or smaller amounts or for longer than recommended. You may take dofetilide with or without food. You should not skip doses or stop using dofetilide suddenly. Stopping suddenly may make your condition worse. Follow your doctor's instructions about tapering your dose. Tell your doctor if you have a prolonged illness that causes severe diarrhea, vomiting, or heavy sweating. These conditions can cause an electrolyte imbalance, making it dangerous for you to use dofetilide. Your blood pressure will need to be checked often. Your kidney function may also need to be checked with frequent blood tests. Store at room temperature away from moisture and heat. What happens if I miss a dose? Skip the missed dose and take your next dose at the usual time to stay on schedule. Do not take extra medicine to make up the missed dose. What happens if I overdose? Seek emergency medical attention or call the Poison Help line at 1-511.563.7323. What should I avoid while taking dofetilide? Grapefruit and grapefruit juice may interact with dofetilide and lead to unwanted side effects. Discuss the use of grapefruit products with your doctor. What are the possible side effects of dofetilide? Get emergency medical help if you have any of these signs of an allergic reaction: hives; difficult breathing; swelling of your face, lips, tongue, or throat. Call your doctor at once if you have:  · headache with chest pain and severe dizziness, fainting, fast or pounding heartbeats;  · loss of appetite, vomiting or severe diarrhea; or  · low magnesium or potassium --confusion, uneven heart rate, increased thirst or urination, sweating, jerking muscle movements, leg discomfort, muscle weakness or limp feeling. Common side effects may include:  · mild headache;  · mild dizziness; or  · cold symptoms such as stuffy nose, sneezing, sore throat. This is not a complete list of side effects and others may occur. Call your doctor for medical advice about side effects. You may report side effects to FDA at 9-002-HCU-5937. What other drugs will affect dofetilide? Other drugs may interact with dofetilide, including prescription and over-the-counter medicines, vitamins, and herbal products. Tell each of your health care providers about all medicines you use now and any medicine you start or stop using. Where can I get more information? Your doctor or pharmacist can provide more information about dofetilide. Remember, keep this and all other medicines out of the reach of children, never share your medicines with others, and use this medication only for the indication prescribed. Every effort has been made to ensure that the information provided by [address] is accurate, up-to-date, and complete, but no guarantee is made to that effect. Drug information contained herein may be time sensitive. [facility] information has been compiled for use by healthcare practitioners and consumers in the [place] and therefore [facility] does not warrant that uses outside of the [place] are appropriate, unless specifically indicated otherwise. [facility]'s drug information does not endorse drugs, diagnose patients or recommend therapy. [facility]'s drug information is an informational resource designed to assist licensed healthcare practitioners in caring for their patients and/or to serve consumers viewing this service as a supplement to, and not a substitute for, the expertise, skill, knowledge and judgment of healthcare practitioners. The absence of a warning for a given drug or drug combination in no way should be construed to indicate that the drug or drug combination is safe, effective or appropriate for any given patient. [facility] does not assume any responsibility for any aspect of healthcare administered with the aid of information [facility] provides. The information contained herein is not intended to cover all possible uses, directions, precautions, warnings, drug interactions, allergic reactions, or adverse effects. If you have questions about the drugs you are taking, check with your doctor, nurse or pharmacist.  Copyright 2598-3366 [facility] LOW. Version: 4.01. Revision date: 4/13/2016. Care instructions adapted under license by [facility] ([facility]). If you have questions about a medical condition or this instruction, always ask your healthcare professional. [place] [address] any warranty or liability for your use of this information. Patient Education        Learning About Atrial Fibrillation  What is atrial fibrillation? Atrial fibrillation (say \"AY-tree-cleo adp-abqx-SGK-shun\") is the most common type of irregular heartbeat (arrhythmia). Normally, the heart beats in a strong, steady rhythm. In atrial fibrillation, a problem with the heart's electrical system causes the two upper parts of the heart (the atria) to quiver, or fibrillate. Your heart rate also may be faster than normal.  Atrial fibrillation can be dangerous because if the heartbeat isn't strong and steady, blood can collect, or pool, in the atria. And pooled blood is more likely to form clots. Clots can travel to the brain, block blood flow, and cause a stroke. Atrial fibrillation can also lead to heart failure. Treatment for atrial fibrillation helps prevent stroke and heart failure. It also helps relieve symptoms. Atrial fibrillation is often caused by another heart problem. It may happen after heart surgery. It may also be caused by other problems, such as an overactive thyroid gland or lung disease. Many people with atrial fibrillation are able to live full and active lives. What are the symptoms? Some people feel symptoms when they have episodes of atrial fibrillation. But other people don't notice any symptoms. If you have symptoms, you may feel:  · A fluttering, racing, or pounding feeling in your chest called palpitations. · Weak or tired. · Dizzy or lightheaded. · Short of breath. · Chest pain. · Confused. You may notice signs of atrial fibrillation when you check your pulse. Your pulse may seem uneven or fast.  What can you expect when you have atrial fibrillation? At first, spells of atrial fibrillation may come on suddenly and last a short time. It may go away on its own or it goes away after treatment. This is called paroxysmal atrial fibrillation. Over time, the spells may last longer and occur more often. They often don't go away on their own. How is it treated? If you do not have an account, please click on the \"Sign Up Now\" link. Current as of: December 16, 2019               Content Version: 12.6  © 2006-2020 [email or web address]. Care instructions adapted under license by [facility] ([facility]). If you have questions about a medical condition or this instruction, always ask your healthcare professional. [address] any warranty or liability for your use of this information. Patient Education        Electrophysiology Study and Catheter Ablation: Before Your Procedure  What is an electrophysiology study and catheter ablation? An electrophysiology study is a test to see if there is a problem with your heart rhythm and to find out how to fix it. It is also called an EP study. A catheter ablation procedure is sometimes done at the same time. This procedure destroys (ablates) small areas of your heart that are causing your heart rhythm problem. The doctor puts plastic tubes called catheters into blood vessels in your groin, arm, or neck. He or she then uses an X-ray machine to guide long wires through the tubes to your heart. The doctor can use these wires to record your heart's electrical signals. If the doctor thinks your problem can be fixed with ablation, he or she can use the wires to destroy a small part of your heart tissue. This is most often done with radio waves. You will probably be awake during the procedure. But you might be asleep. The doctor will give you medicines to help you feel relaxed and to numb the areas where the catheters go in. An EP study and ablation can take 2 to 6 hours. In rare cases, it can take longer. If you have an EP study only and you don't need more treatment, you may go home the same day. But if you also have ablation, you may stay overnight in the hospital. How long you stay in the hospital depends on the type of ablation you have. Do not exercise hard or lift anything heavy for a week. You may be able to go back to work and to your normal routine in 1 or 2 days. Follow-up care is a key part of your treatment and safety. Be sure to make and go to all appointments, and call your doctor if you are having problems. It's also a good idea to know your test results and keep a list of the medicines you take. How do you prepare for the procedure? Procedures can be stressful. This information will help you understand what you can expect. And it will help you safely prepare for your procedure. Preparing for the procedure    · Be sure you have someone to take you home. Anesthesia and pain medicine will make it unsafe for you to drive or get home on your own.     · Understand exactly what procedure is planned, along with the risks, benefits, and other options.     · Tell your doctor ALL the medicines, vitamins, supplements, and herbal remedies you take. Some may increase the risk of problems during your procedure. Your doctor will tell you if you should stop taking any of them before the procedure and how soon to do it.     · If you take aspirin or some other blood thinner, ask your doctor if you should stop taking it before your procedure. Make sure that you understand exactly what your doctor wants you to do. These medicines increase the risk of bleeding.     · Make sure your doctor and the hospital have a copy of your advance directive. If you don't have one, you may want to prepare one. It lets others know your health care wishes. It's a good thing to have before any type of surgery or procedure. What happens on the day of the procedure? · Follow the instructions exactly about when to stop eating and drinking. If you don't, your procedure may be canceled. If your doctor told you to take your medicines on the day of the procedure, take them with only a sip of water.   · Your doctor will tell you if and when you can restart your medicines. He or she will also give you instructions about taking any new medicines.     · If you take aspirin or some other blood thinner, ask your doctor if and when to start taking it again. Make sure that you understand exactly what your doctor wants you to do.     · Ask your doctor if you can take acetaminophen (Tylenol) for pain. Do not take aspirin for 3 days, unless your doctor says it is okay.     · Check with your doctor before you take aspirin or anti-inflammatory medicines to reduce pain and swelling. These include ibuprofen (Advil, Motrin) and naproxen (Aleve).   · Make sure you know which heart medicines to continue and which ones to stop. Ask your doctor if you aren't sure. Catheter site care    · You can remove your bandages the day after the procedure.     · You may shower 24 to 48 hours after the procedure, if your doctor okays it. Pat the incision dry.     · Do not soak the catheter site until it is healed. Don't take a bath for 1 week, or until your doctor tells you it is okay.     · Watch for bleeding from the site. A small amount of blood (up to the size of a quarter) on the bandage can be normal.     · If you are bleeding, lie down and press on the area for 15 minutes to try to make it stop. If the bleeding does not stop, call your doctor or seek immediate medical care. Follow-up care is a key part of your treatment and safety. Be sure to make and go to all appointments, and call your doctor if you are having problems. It's also a good idea to know your test results and keep a list of the medicines you take. When should you call for help? Call  911 anytime you think you may need emergency care. For example, call if:    · You passed out (lost consciousness).     · You have symptoms of a heart attack. These may include:  ? Chest pain or pressure, or a strange feeling in the chest.  ? Sweating. ? Shortness of breath. ? Nausea or vomiting. ? Pain, pressure, or a strange feeling in the back, neck, jaw, or upper belly, or in one or both shoulders or arms. ? Lightheadedness or sudden weakness. ? A fast or irregular heartbeat. After you call 911, the  may tell you to chew 1 adult-strength or 2 to 4 low-dose aspirin. Wait for an ambulance. Do not try to drive yourself.     · You have symptoms of a stroke. These may include:  ? Sudden numbness, tingling, weakness, or loss of movement in your face, arm, or leg, especially on only one side of your body. ? Sudden vision changes. ? Sudden trouble speaking. ? Sudden confusion or trouble understanding simple statements. ? Sudden problems with walking or balance. ? A sudden, severe headache that is different from past headaches. Call your doctor now or seek immediate medical care if:    · You are bleeding from the area where the catheter was put in your blood vessel.     · You have a fast-growing, painful lump at the catheter site.     · You have signs of infection, such as:  ? Increased pain, swelling, warmth, or redness. ? Red streaks leading from the catheter site. ? Pus draining from the catheter site. ? A fever.     · Your leg, arm, or hand is painful, looks blue, or feels cold, numb, or tingly. Watch closely for any changes in your health, and be sure to contact your doctor if you have any problems. Where can you learn more? Go to https://[employer]. org and sign in to your [employer] account. Enter [phone] in the [facility] box to learn more about \"Electrophysiology Study and Catheter Ablation: What to Expect at Home. \"     If you do not have an account, please click on the \"Sign Up Now\" link. Current as of: December 16, 2019               Content Version: 12.6  © 3335-1044 [employer]. Care instructions adapted under license by [facility] ([facility]). If you have questions about a medical condition or this instruction, always ask your healthcare professional. [address] any warranty or liability for your use of this information.

## 2021-02-09 NOTE — PROGRESS NOTES
[facility]   Electrophysiology Note      Date: 2/9/21  Patient Name: Ashley Amaro  YOB: 1950     Chief Complaint: Irregular Heart Beat (2/3/21 had an episode that lasted several hours) and Atrial Fibrillation    Primary Care Physician: Gillian Mansfield DO     HISTORY OF PRESENT ILLNESS: Ashley Amaro is a 65 y.o. female who presents for follow up for paroxysmal atrial fibrillation. She has a past medical history of PAF, FADI, HTN, and DM. She wore a 24 HR Cardiac Event Monitor in 8/2013 with avg HR 69 () that demonstrated SR with PAC's and PVC's. She has been taking flecainide for PAF. She briefly stopped taking flecainide in 6/2016 due to cost but restarted due to palpitations. Her primary care physician manages her coumadin and her INR. She underwent an echocardiogram on 7/21/20 that demonstrated an EF of 55% with no regional wall motion abnormalities, mild AS, AR, MR and NV, LA normal in size. At her 11/10/20 OV, she presented using a dockery and had recent hip surgery. She reported she occasionally feels palpitations that are very brief and not bothersome. She reported her coumadin is managed by her PCP and she reports she is usually therapeutic. She reported she is increasing her activity as tolerated due to her recent hip surgery. She reported she is tolerating her activity well. Her flecainide was discontinued and she was started on Cardizem 120mg nightly. Interval History Since Last Office Visit:   Patient called in 2/3/2021 to report that she felt that her AF had returned and was lasting much longer in duration. She reported that she was taking cardizem in the morning two weeks after he last visit until she read her AVS and the directions on the medication said take cardizem at night. She then started to take it at night.    Today, 2/9/2021, patient's ECG demonstrates junctional rhythm at 74 BPM. She reports on 2/3/2021 she believes she went into AF early in the morning and it lasted for several hours. She reports she took her vitals and they were 216/181 with a HR in the 80s. She reports she thinks her BP cuff gave her an inaccurate reading and is going to replace it soon. She reports her BP at home today was 118/66, pulse 70 and she was feeling well. She reports she bought stationary bicycle pedals that she plans on using for 30 minutes several times per day. She just put it together yesterday. She reports she is taking all medications as prescribed and tolerates them well. She denies bleeding or bruising issues. Patient denies current chest pain, sob, palpitations, dizziness or syncope. Past Medical History:   has a past medical history of Atrial fibrillation ([facility] [address] Diabetes mellitus ([facility] [address] Hyperlipidemia, Hypertension, Olecranon bursitis, left elbow, and FADI (obstructive sleep apnea). Past Surgical History:   has a past surgical history that includes Tubal ligation; Hysterectomy; joint replacement (Bilateral); Rotator cuff repair (Bilateral); other surgical history (11/12/2014); Finger trigger release (Right, 10/04/2017); eye surgery (Bilateral); Tonsillectomy; Colonoscopy; and Total hip arthroplasty (Left, 9/24/2020). Social History:   reports that she has never smoked. She has never used smokeless tobacco. She reports that she does not drink alcohol or use drugs. Family History: family history includes Cancer in her father and mother. Allergies:  Furosemide, Hydrocodone-acetaminophen, Sitagliptin, and Morphine    Home Medications:    Prior to Admission medications    Medication Sig Start Date End Date Taking?  Authorizing Provider   dilTIAZem (CARDIZEM CD) 120 MG extended release capsule Take 1 capsule by mouth daily 11/10/20  Yes Suly Luna MD   glipiZIDE (GLUCOTROL XL) 5 MG extended release tablet Take 5 mg by mouth daily   Yes Historical Provider, MD   Cholecalciferol (VITAMIN D-3) 25 MCG (1000 UT) CAPS Take by mouth daily   Yes Historical Provider, MD   hydrochlorothiazide (HYDRODIURIL) 25 MG tablet Take 1 tablet by mouth daily 10/2/18  Yes KIMO Rossi CNP   atorvastatin (LIPITOR) 40 MG tablet Take 40 mg by mouth nightly  17  Yes Historical Provider, MD   warfarin (COUMADIN) 2 MG tablet Take 2 tablets by mouth daily 16  Yes KIMO Rossi CNP   metFORMIN (GLUCOPHAGE) 500 MG tablet Take 1,000 mg by mouth 2 times daily (with meals)    Yes Historical Provider, MD   losartan (COZAAR) 100 MG tablet Take 100 mg by mouth daily. Yes Historical Provider, MD   amoxicillin (AMOXIL) 500 MG capsule Take 4 capsules 1 hour prior to dental procedure  Patient not taking: Reported on 20   NOBLE Pedraza   Probiotic Product (PROBIOTIC PO) Take 1 tablet by mouth daily    Historical Provider, MD       REVIEW OF SYSTEMS:      All 14-point review of systems are completed and pertinent positives are mentioned in the history of present illness. Other systems are reviewed and are negative. Physical Examination:    /60   Pulse 72   Ht 5' 2\" (1.575 m)   Wt 228 lb (103.4 kg)   BMI 41.70 kg/m²    Constitutional and General Appearance: alert, cooperative, no distress and appears stated age  [de-identified]: PERRL, no cervical lymphadenopathy. No masses palpable. Normal oral mucosa  Respiratory:  · Normal excursion and expansion without use of accessory muscles  · Resp Auscultation: Normal breath sounds without dullness or wheezing  Cardiovascular:  · The apical impulse is not displaced  · RRR S1S2 w/o M/G/R  Abdomen:  · No masses or tenderness  · Bowel sounds present  Extremities:  ·  No Cyanosis or Clubbing  ·  Lower extremity edema: No  · Skin: Warm and dry  Neurological:  · Alert and oriented.   · Moves all extremities well  · No abnormalities of mood, affect, memory, mentation, or behavior are noted    DATA:    ECG 2021: Junctional rhythm, 74 BPM.     EC/10/20: SR with HR 87 BPM    ECHO: 7/21/20:  Summary   Normal left ventricle systolic function with an estimated ejection fraction   of 55%. No regional wall motion abnormalities are seen. Normal left ventricular diastolic filling pressure. The aortic valve appears mildly thickened/calcified with decreased leaflet   mobility. Mild aortic stenosis. Mild aortic regurgitation. Mild mitral and pulmonic regurgitation. Systolic pulmonary artery pressure (SPAP) is normal and estimated at 28 mmHg   (right atrial pressure 3 mmHg). LA normal size. ECHO: 7/26/17:  Summary   Technically difficult examination. Normal left ventricle systolic function with an estimated ejection fraction   of 55%. No regional wall motion abnormalities are seen. Normal left ventricular diastolic filling pressure. MIld pulmonic regurgitation. Systolic pulmonary artery pressure (SPAP) is normal and estimated at 26 mmHg   (RA pressure 3 mmHg). IMPRESSION:    1. Paroxysmal Atrial Fibrillation ([id number] 4)  11/10/2020  Mrs. Ricki Avila is a pleasant 65year old female with a medial history significant for paroxysmal atrial fibrillation, hypertension, and diabetes mellitus tyep II who presents from home to [facility] care. According to patient she continues to intermittent palpitations that were previously thought to be secondary to PACs and is during her paroxysmal atrial fibrillation she did not note any palpitations. She has been tolerating her medicine flecainide however is concerned about the price of the medication. She is not on diltiazem per notes because of her request.  Patient does not appear to be interested in more aggressive therapy as she is not really symptomatic from her atrial fibrillation. She did know when she was off of her flecainide she did seem to feel more palpitations. I think that while being on a 1C agent you should also be on a rate control agent.   Given the cost issue and the lack of symptoms currently I think it be reasonable to stop the flecainide and start her on low-dose diltiazem at nighttime. I will have her start this and follow-up with nurse practitioner in a couple months see how she is feeling. If she is symptomatic but tolerating her diltiazem we will restart the flecainide. If her blood pressure is low we will stop her hydrochlorothiazide in favor of the diltiazem. - Stop flecainide.  - Start diltiazem. - Continue warfarin (managed by PCP). - Follow up with EP NP in 2-3 months unless/until procedure/discussion required or PRN.    2/9/2021  Patient presents for follow-up. From what I can tell her atrial fibrillation is most likely triggered by her obstructive sleep apnea. Unfortunately she is unable to tolerate CPAP therapy. She has been having more more palpitations that she attributes to atrial fibrillation. We recently discontinued her flecainide because she did not think it was effective and was not having any atrial fibrillation. She now believes that her flecainide was somewhat effective and would like to restart. We will restart her flecainide at her previous dose of 50 mg twice a day with boluses up to 100 mg every 2 hours not to exceed 300 mg a day. If this does not work we will increase her flecainide to 100 mg twice a day with an additional 100 mg as needed for breakthrough atrial fibrillation. If she needs further control we will consider class III antiarrhythmic and or ablation. Patient agrees with plan. - Restart flecainide 50 mg BID with pill in pocket strategy, not to exceed more than 300 mg in 24 hour period. If this is not effective then we will increase flecainide to 100 mg BID. If not effective we could try 150 mg BID however would prefer to switch to class III agent or ablation. Baseline  ms off flecainide.  - Continue diltiazem 120 mg QHS. - EKG in 1 week. - Once patient feels better we will ask for a monitor for 1-2 weeks, she will let us know.   - Follow up with me in 3 months. RECOMMENDATIONS:  1. Discussed at length treatment options for AF:    1. Stop diltiazem and restart flecainide at 100mg BID. Can take 100mg PRN for palpitations lasting more than 1 hour (we will call to correct this). 2. Ablation to burn the abnormal electrical activity within the heart. Discussed risks and benefits. 3. Medications for rhythm control (dronedarone, or dofetilide which requires hospital admission for three days). Discussed risks and benefits. 2. ECG next week due to restarting flecainide. 3. Call the office and let us know how you are doing. We would like for you to wear a 2 week cardiac event monitor to assess palpitations after a month of flecainide. 4. Follow up with [address] [place] [address] 200 in 3 months. QUALITY MEASURES  1. Tobacco Cessation Counseling: NA  2. Retake of BP if >140/90:   NA  3. Documentation to PCP/referring for new patient:  Sent to PCP at close of office visit  4. CAD patient on anti-platelet: NA  5. CAD patient on STATIN therapy:  Yes  6. Patient with CHF and aFib on anticoagulation:  Yes     All questions and concerns were addressed to the patient/family. Alternatives to my treatment were discussed. Dr. Keagan Steel MD  Electrophysiology  [facility]. [address]. [address]. [place], [address]  Phone: [phone]  Fax: [phone]   2/9/2021     NOTE: This report was transcribed using voice recognition software. Every effort was made to ensure accuracy, however, inadvertent computerized transcription errors may be present. This note has been scribed in the presence of Dom Arenas MD by Xander Paz RN. The scribe's documentation has been prepared under my direction and personally reviewed by me in its entirety. I confirm that the note above accurately reflects all work, physical examination, the discussion of treatments and procedures, and medical decision making performed by me.       Eri Musa MD personally performed the services described in this documentation as scribed by nurse in my presence, and is both accurate and complete.     Electronically signed by Eusebio Duque MD on 2/10/2021 at 9:16 AM

## 2021-02-10 ENCOUNTER — TELEPHONE (OUTPATIENT)
Dept: CARDIOLOGY CLINIC | Age: 71
End: 2021-02-10

## 2021-02-10 DIAGNOSIS — I48.0 PAROXYSMAL ATRIAL FIBRILLATION (HCC): ICD-10-CM

## 2021-02-10 RX ORDER — FLECAINIDE ACETATE 100 MG/1
50 TABLET ORAL 2 TIMES DAILY
Qty: 200 TABLET | Refills: 2 | Status: SHIPPED | OUTPATIENT
Start: 2021-02-10 | End: 2021-02-10

## 2021-02-10 RX ORDER — FLECAINIDE ACETATE 50 MG/1
50 TABLET ORAL 2 TIMES DAILY
Qty: 200 TABLET | Refills: 3 | OUTPATIENT
Start: 2021-02-10 | End: 2021-09-07

## 2021-02-10 NOTE — TELEPHONE ENCOUNTER
Ok sounds good. I should have looked there first! Med pending with new sig. Informed patient of change. Will call pharmacy to clarify as already e-scribed.

## 2021-02-10 NOTE — TELEPHONE ENCOUNTER
Good question, no we are restarting where she was before with  mg. If that doesn't work then will increase to 100 mg BID. Hopefully we find right dose for her. I go into more detail in her note from yesterday but I think we start there. Thanks for your help.

## 2021-02-10 NOTE — TELEPHONE ENCOUNTER
Pt saw [address] [facility] 2/9/2021. Scripts/refills not sent to pharmacy. 2/9/2021 office note:    1. Stop diltiazem and restart flecainide at 100mg BID. Can take 100mg PRN for palpitations lasting more than 1 hour.      [facility] to [facility] [address], [address] [place] [address] [phone] - F [phone]

## 2021-02-10 NOTE — TELEPHONE ENCOUNTER
Jannet Curtis.MD [place] Patient, RN   Caller: Unspecified (Today,  9:16 AM)             Good morning, please see if she would continue to take diltiazem.  Must take both medications together.  Thanks for help.        CAROLYN, spoke with patient and she is agreeable. Do you still want her to go up on flecainide (100mg BID and 100mg PRN)? She was previously on 50mg BID (no PRN dose). Previous diltiazem dose was 120mg QD. (Patient informed us that flecainide not received by pharmacy despite it being e-scribed on our end. Once dose verified will call patient and pharmacy to update/make sure med called in).

## 2021-02-24 ENCOUNTER — TELEPHONE (OUTPATIENT)
Dept: CARDIOLOGY CLINIC | Age: 71
End: 2021-02-24

## 2021-02-24 DIAGNOSIS — I48.0 PAROXYSMAL ATRIAL FIBRILLATION (HCC): Primary | ICD-10-CM

## 2021-02-26 ENCOUNTER — IMMUNIZATION (OUTPATIENT)
Dept: PRIMARY CARE CLINIC | Age: 71
End: 2021-02-26
Payer: MEDICARE

## 2021-02-26 PROCEDURE — 0001A COVID-19, PFIZER VACCINE 30MCG/0.3ML DOSE: CPT | Performed by: FAMILY MEDICINE

## 2021-02-26 PROCEDURE — 91300 COVID-19, PFIZER VACCINE 30MCG/0.3ML DOSE: CPT | Performed by: FAMILY MEDICINE

## 2021-03-19 ENCOUNTER — IMMUNIZATION (OUTPATIENT)
Dept: PRIMARY CARE CLINIC | Age: 71
End: 2021-03-19
Payer: MEDICARE

## 2021-03-19 PROCEDURE — 0002A COVID-19, PFIZER VACCINE 30MCG/0.3ML DOSE: CPT | Performed by: FAMILY MEDICINE

## 2021-03-19 PROCEDURE — 91300 COVID-19, PFIZER VACCINE 30MCG/0.3ML DOSE: CPT | Performed by: FAMILY MEDICINE

## 2021-07-01 ENCOUNTER — HOSPITAL ENCOUNTER (OUTPATIENT)
Dept: MAMMOGRAPHY | Age: 71
Discharge: HOME | End: 2021-07-01
Payer: MEDICARE

## 2021-07-01 ENCOUNTER — TELEPHONE (OUTPATIENT)
Dept: MAMMOGRAPHY | Age: 71
End: 2021-07-01

## 2021-07-01 DIAGNOSIS — Z12.31 SCREENING MAMMOGRAM, ENCOUNTER FOR: ICD-10-CM

## 2021-07-01 PROCEDURE — 77067 SCR MAMMO BI INCL CAD: CPT

## 2021-08-20 ENCOUNTER — APPOINTMENT (OUTPATIENT)
Dept: CT IMAGING | Age: 71
End: 2021-08-20
Payer: MEDICARE

## 2021-08-20 ENCOUNTER — HOSPITAL ENCOUNTER (EMERGENCY)
Age: 71
Discharge: HOME | End: 2021-08-20
Attending: EMERGENCY MEDICINE
Payer: MEDICARE

## 2021-08-20 ENCOUNTER — APPOINTMENT (OUTPATIENT)
Dept: GENERAL RADIOLOGY | Age: 71
End: 2021-08-20
Payer: MEDICARE

## 2021-08-20 VITALS
BODY MASS INDEX: 43.06 KG/M2 | DIASTOLIC BLOOD PRESSURE: 113 MMHG | OXYGEN SATURATION: 98 % | HEART RATE: 71 BPM | TEMPERATURE: 99 F | SYSTOLIC BLOOD PRESSURE: 129 MMHG | WEIGHT: 234 LBS | RESPIRATION RATE: 17 BRPM | HEIGHT: 62 IN

## 2021-08-20 DIAGNOSIS — R93.2 ABNORMAL CT SCAN, GALLBLADDER: Primary | ICD-10-CM

## 2021-08-20 LAB
A/G RATIO: 1.2 (ref 1.1–2.2)
ALBUMIN SERPL-MCNC: 4 G/DL (ref 3.4–5)
ALP BLD-CCNC: 64 U/L (ref 40–129)
ALT SERPL-CCNC: 12 U/L (ref 10–40)
ANION GAP SERPL CALCULATED.3IONS-SCNC: 16 MMOL/L (ref 3–16)
APTT: 40.4 SEC (ref 26.2–38.6)
AST SERPL-CCNC: 15 U/L (ref 15–37)
BASOPHILS ABSOLUTE: 0.1 K/UL (ref 0–0.2)
BASOPHILS RELATIVE PERCENT: 1.3 %
BILIRUB SERPL-MCNC: 1.5 MG/DL (ref 0–1)
BILIRUBIN URINE: NEGATIVE
BLOOD, URINE: ABNORMAL
BUN BLDV-MCNC: 16 MG/DL (ref 7–20)
CALCIUM SERPL-MCNC: 9.4 MG/DL (ref 8.3–10.6)
CHLORIDE BLD-SCNC: 97 MMOL/L (ref 99–110)
CLARITY: CLEAR
CO2: 24 MMOL/L (ref 21–32)
COLOR: YELLOW
CREAT SERPL-MCNC: 0.7 MG/DL (ref 0.6–1.2)
D DIMER: 422 NG/ML DDU (ref 0–229)
EOSINOPHILS ABSOLUTE: 0.1 K/UL (ref 0–0.6)
EOSINOPHILS RELATIVE PERCENT: 1.2 %
GFR AFRICAN AMERICAN: >60
GFR NON-AFRICAN AMERICAN: >60
GLOBULIN: 3.4 G/DL
GLUCOSE BLD-MCNC: 97 MG/DL (ref 70–99)
GLUCOSE URINE: NEGATIVE MG/DL
HCT VFR BLD CALC: 38.2 % (ref 36–48)
HEMOGLOBIN: 12.9 G/DL (ref 12–16)
INR BLD: 3.33 (ref 0.88–1.12)
KETONES, URINE: NEGATIVE MG/DL
LACTIC ACID, SEPSIS: 1.4 MMOL/L (ref 0.4–1.9)
LEUKOCYTE ESTERASE, URINE: NEGATIVE
LIPASE: 19 U/L (ref 13–60)
LYMPHOCYTES ABSOLUTE: 1.5 K/UL (ref 1–5.1)
LYMPHOCYTES RELATIVE PERCENT: 14 %
MAGNESIUM: 1 MG/DL (ref 1.8–2.4)
MCH RBC QN AUTO: 27.3 PG (ref 26–34)
MCHC RBC AUTO-ENTMCNC: 33.9 G/DL (ref 31–36)
MCV RBC AUTO: 80.6 FL (ref 80–100)
MICROSCOPIC EXAMINATION: YES
MONOCYTES ABSOLUTE: 0.9 K/UL (ref 0–1.3)
MONOCYTES RELATIVE PERCENT: 8.5 %
NEUTROPHILS ABSOLUTE: 8.1 K/UL (ref 1.7–7.7)
NEUTROPHILS RELATIVE PERCENT: 75 %
NITRITE, URINE: NEGATIVE
PDW BLD-RTO: 14.1 % (ref 12.4–15.4)
PH UA: 6 (ref 5–8)
PLATELET # BLD: 230 K/UL (ref 135–450)
PMV BLD AUTO: 8.5 FL (ref 5–10.5)
POTASSIUM SERPL-SCNC: 3.5 MMOL/L (ref 3.5–5.1)
PROTEIN UA: NEGATIVE MG/DL
PROTHROMBIN TIME: 39.6 SEC (ref 9.9–12.7)
RBC # BLD: 4.74 M/UL (ref 4–5.2)
RBC UA: NORMAL /HPF (ref 0–4)
SODIUM BLD-SCNC: 137 MMOL/L (ref 136–145)
SPECIFIC GRAVITY UA: 1.01 (ref 1–1.03)
SPECIMEN STATUS: NORMAL
TOTAL PROTEIN: 7.4 G/DL (ref 6.4–8.2)
TROPONIN: <0.01 NG/ML
URINE TYPE: ABNORMAL
UROBILINOGEN, URINE: 0.2 E.U./DL
WBC # BLD: 10.8 K/UL (ref 4–11)
WBC UA: NORMAL /HPF (ref 0–5)

## 2021-08-20 PROCEDURE — 74177 CT ABD & PELVIS W/CONTRAST: CPT

## 2021-08-20 PROCEDURE — 84484 ASSAY OF TROPONIN QUANT: CPT

## 2021-08-20 PROCEDURE — 71260 CT THORAX DX C+: CPT

## 2021-08-20 PROCEDURE — 93005 ELECTROCARDIOGRAM TRACING: CPT | Performed by: EMERGENCY MEDICINE

## 2021-08-20 PROCEDURE — 71045 X-RAY EXAM CHEST 1 VIEW: CPT

## 2021-08-20 PROCEDURE — 85379 FIBRIN DEGRADATION QUANT: CPT

## 2021-08-20 PROCEDURE — 96365 THER/PROPH/DIAG IV INF INIT: CPT

## 2021-08-20 PROCEDURE — 83735 ASSAY OF MAGNESIUM: CPT

## 2021-08-20 PROCEDURE — 85610 PROTHROMBIN TIME: CPT

## 2021-08-20 PROCEDURE — 80053 COMPREHEN METABOLIC PANEL: CPT

## 2021-08-20 PROCEDURE — 99284 EMERGENCY DEPT VISIT MOD MDM: CPT

## 2021-08-20 PROCEDURE — 83690 ASSAY OF LIPASE: CPT

## 2021-08-20 PROCEDURE — 83605 ASSAY OF LACTIC ACID: CPT

## 2021-08-20 PROCEDURE — 81001 URINALYSIS AUTO W/SCOPE: CPT

## 2021-08-20 PROCEDURE — 85730 THROMBOPLASTIN TIME PARTIAL: CPT

## 2021-08-20 PROCEDURE — 2580000003 HC RX 258: Performed by: EMERGENCY MEDICINE

## 2021-08-20 PROCEDURE — 6360000004 HC RX CONTRAST MEDICATION: Performed by: EMERGENCY MEDICINE

## 2021-08-20 PROCEDURE — 96366 THER/PROPH/DIAG IV INF ADDON: CPT

## 2021-08-20 PROCEDURE — 6360000002 HC RX W HCPCS: Performed by: EMERGENCY MEDICINE

## 2021-08-20 PROCEDURE — 85025 COMPLETE CBC W/AUTO DIFF WBC: CPT

## 2021-08-20 RX ORDER — HYDROCODONE BITARTRATE AND ACETAMINOPHEN 5; 325 MG/1; MG/1
1 TABLET ORAL EVERY 6 HOURS PRN
Qty: 8 TABLET | Refills: 0 | Status: SHIPPED | OUTPATIENT
Start: 2021-08-20 | End: 2021-08-23

## 2021-08-20 RX ORDER — ONDANSETRON 4 MG/1
4 TABLET, ORALLY DISINTEGRATING ORAL 3 TIMES DAILY PRN
Qty: 6 TABLET | Refills: 0 | Status: SHIPPED | OUTPATIENT
Start: 2021-08-20 | End: 2021-09-07

## 2021-08-20 RX ORDER — MAGNESIUM SULFATE IN WATER 40 MG/ML
2000 INJECTION, SOLUTION INTRAVENOUS ONCE
Status: COMPLETED | OUTPATIENT
Start: 2021-08-20 | End: 2021-08-20

## 2021-08-20 RX ORDER — 0.9 % SODIUM CHLORIDE 0.9 %
1000 INTRAVENOUS SOLUTION INTRAVENOUS ONCE
Status: COMPLETED | OUTPATIENT
Start: 2021-08-20 | End: 2021-08-20

## 2021-08-20 RX ADMIN — MAGNESIUM SULFATE HEPTAHYDRATE 2000 MG: 40 INJECTION, SOLUTION INTRAVENOUS at 16:24

## 2021-08-20 RX ADMIN — IOPAMIDOL 75 ML: 755 INJECTION, SOLUTION INTRAVENOUS at 17:53

## 2021-08-20 RX ADMIN — SODIUM CHLORIDE 1000 ML: 9 INJECTION, SOLUTION INTRAVENOUS at 16:22

## 2021-08-20 ASSESSMENT — PAIN SCALES - GENERAL: PAINLEVEL_OUTOF10: 8

## 2021-08-20 ASSESSMENT — ENCOUNTER SYMPTOMS: TACHYPNEA: 1

## 2021-08-20 NOTE — ED NOTES
Pt stat that Friday she had VFIB and had to be cardio converted after that her stomach with upset. Tuesday she started with vomiting and diarrhea. Pt state then yesterday along with that she started with abdomen pain more right sided. Pt state that she called her MD and was told to come in to the ER to r/o appy. Pt awake alert. Pt walk with a steady gait. Pt with brisk cap refill. Pt MMM/PK . Pt skin dry. Pt stat that she drove herself here to the hospital. Pt with monitor in place and family at bedside. Pt with no abd guarding.       Na Vargas RN  08/20/21 [id number]

## 2021-08-20 NOTE — ED PROVIDER NOTES
[address] [facility]  ED      CHIEF COMPLAINT  Abdominal Pain (t max 99.6 pt state that she had a cardioversion done last thursday afib ), Emesis, and Diarrhea       HISTORY OF PRESENT ILLNESS  Nakia Yang is a 66 y.o. female male with history of diabetes, hypertension, hyperlipidemia, and atrial fibrillation on warfarin who presents to the emergency department for evaluation of nausea, vomiting, diarrhea, and abdominal pain. Patient reports having persistent atrial fibrillation that was not controlled with medications and was cardioverted last Thursday. Reports on Tuesday, she started having nausea, vomiting, and diarrhea. Also reports having some decreased appetite and had a glucose reading of 60 at home. Says she tried to drink orange juice at home. She has not seek care until today when she was seen by her PCP. Says she felt miserable on Tuesday and had multiple episodes of vomiting and diarrhea. However, reports her symptoms appear to be getting better on Wednesday and Thursday which is why she waited until she could be seen by her PCP today. She has been having right-sided abdominal pain since Tuesday. Her PCP was concerned and thought she needed a CT of her abdomen and sent her in for evaluation. Reports having some shortness of breath, but reports this could be normal for her. Also says her temperature was elevated at 99.6, but no temperature above 100.4. Denies having nausea currently. Reports having one loose stool today. Says her warfarin was normal last time she got it checked on Thursday before the cardioversion. Denies any known sick contacts. No other complaints, modifying factors or associated symptoms. I have reviewed the following from the nursing documentation.     Past Medical History:   Diagnosis Date    [facility] fibrillation [facility] [address]     Diabetes mellitus ([facility] [address]     Hyperlipidemia     Hypertension     Olecranon bursitis, left elbow 4/13/2017    FADI (obstructive sleep Friends and Family:     Attends Sikhism Services:     Active Member of Clubs or Organizations:     Attends Club or Organization Meetings:     Marital Status:    Intimate Partner Violence:     Fear of Current or Ex-Partner:     Emotionally Abused:     Physically Abused:     Sexually Abused:      No current facility-administered medications for this encounter. Current Outpatient Medications   Medication Sig Dispense Refill    Probiotic Product (PROBIOTIC-10 PO) Take by mouth      ondansetron (ZOFRAN-ODT) 4 MG disintegrating tablet Take 1 tablet by mouth 3 times daily as needed for Nausea or Vomiting 6 tablet 0    HYDROcodone-acetaminophen (NORCO) 5-325 MG per tablet Take 1 tablet by mouth every 6 hours as needed for Pain for up to 3 days. Intended supply: 3 days. Take lowest dose possible to manage pain 8 tablet 0    flecainide (TAMBOCOR) 50 MG tablet Take 1 tablet by mouth 2 times daily May take additional 100mg PRN for palpitations lasting >1 hour. Do not exceed 300mg in 24 hour period. 200 tablet 3    amoxicillin (AMOXIL) 500 MG capsule Take 4 capsules 1 hour prior to dental procedure (Patient not taking: Reported on 2/9/2021) 12 capsule 0    dilTIAZem (CARDIZEM CD) 120 MG extended release capsule Take 1 capsule by mouth daily 90 capsule 3    glipiZIDE (GLUCOTROL XL) 5 MG extended release tablet Take 5 mg by mouth daily      Cholecalciferol (VITAMIN D-3) 25 MCG (1000 UT) CAPS Take by mouth daily      hydrochlorothiazide (HYDRODIURIL) 25 MG tablet Take 1 tablet by mouth daily 90 tablet 3    atorvastatin (LIPITOR) 40 MG tablet Take 40 mg by mouth nightly       warfarin (COUMADIN) 2 MG tablet Take 2 tablets by mouth daily 60 tablet 0    metFORMIN (GLUCOPHAGE) 500 MG tablet Take 1,000 mg by mouth 2 times daily (with meals)       losartan (COZAAR) 100 MG tablet Take 100 mg by mouth daily.        Allergies   Allergen Reactions    Furosemide      Other reaction(s): Dizziness    Hydrocodone-Acetaminophen Nausea Only    Sitagliptin Diarrhea    Morphine Nausea And Vomiting       REVIEW OF SYSTEMS  10 systems reviewed, pertinent positives per HPI otherwise noted to be negative. PHYSICAL EXAM  BP (!) 129/113   Pulse 71   Temp 99 °F (37.2 °C)   Resp 17   Ht 5' 2\" (1.575 m)   Wt 234 lb (106.1 kg)   SpO2 98%   BMI 42.80 kg/m²    GENERAL APPEARANCE: Awake and alert. Clinically non toxic appearing   HENT: Normocephalic. Atraumatic. PERRL. EOMI. No facial droop. HEART/CHEST: RRR. LUNGS: Respirations unlabored. Speaking comfortably in full sentences. ABDOMEN: Soft, non-distended abdomen. Mild tenderness to palpation over the right upper quadrant. No guarding. No rebound. EXTREMITIES: No gross deformities. Moving all extremities. SKIN: Warm and dry. No acute rashes. NEUROLOGICAL: Alert and oriented. No gross facial drooping. Answering questions appropriately. Moving all extremities. PSYCHIATRIC: Pleasant. Normal mood and affect.     LABS  Results for orders placed or performed during the hospital encounter of 08/20/21   Comprehensive Metabolic Panel   Result Value Ref Range    Sodium 137 136 - 145 mmol/L    Potassium 3.5 3.5 - 5.1 mmol/L    Chloride 97 (L) 99 - 110 mmol/L    CO2 24 21 - 32 mmol/L    Anion Gap 16 3 - 16    Glucose 97 70 - 99 mg/dL    BUN 16 7 - 20 mg/dL    CREATININE 0.7 0.6 - 1.2 mg/dL    GFR Non-African American >60 >60    GFR African American >60 >60    Calcium 9.4 8.3 - 10.6 mg/dL    Total Protein 7.4 6.4 - 8.2 g/dL    Albumin 4.0 3.4 - 5.0 g/dL    Albumin/Globulin Ratio 1.2 1.1 - 2.2    Total Bilirubin 1.5 (H) 0.0 - 1.0 mg/dL    Alkaline Phosphatase 64 40 - 129 U/L    ALT 12 10 - 40 U/L    AST 15 15 - 37 U/L    Globulin 3.4 g/dL   CBC Auto Differential   Result Value Ref Range    WBC 10.8 4.0 - 11.0 K/uL    RBC 4.74 4.00 - 5.20 M/uL    Hemoglobin 12.9 12.0 - 16.0 g/dL    Hematocrit 38.2 36.0 - 48.0 %    MCV 80.6 80.0 - 100.0 fL    MCH 27.3 26.0 - 34.0 pg    MCHC 33.9 31.0 - 36.0 g/dL    RDW 14.1 12.4 - 15.4 %    Platelets 708 524 - 033 K/uL    MPV 8.5 5.0 - 10.5 fL    Neutrophils % 75.0 %    Lymphocytes % 14.0 %    Monocytes % 8.5 %    Eosinophils % 1.2 %    Basophils % 1.3 %    Neutrophils Absolute 8.1 (H) 1.7 - 7.7 K/uL    Lymphocytes Absolute 1.5 1.0 - 5.1 K/uL    Monocytes Absolute 0.9 0.0 - 1.3 K/uL    Eosinophils Absolute 0.1 0.0 - 0.6 K/uL    Basophils Absolute 0.1 0.0 - 0.2 K/uL   Troponin   Result Value Ref Range    Troponin <0.01 <0.01 ng/mL   Lipase   Result Value Ref Range    Lipase 19.0 13.0 - 60.0 U/L   Magnesium   Result Value Ref Range    Magnesium 1.00 (L) 1.80 - 2.40 mg/dL   Lactate, Sepsis   Result Value Ref Range    Lactic Acid, Sepsis 1.4 0.4 - 1.9 mmol/L   D-Dimer, Quantitative   Result Value Ref Range    D-Dimer, Quant 422 (H) 0 - 229 ng/mL DDU   APTT   Result Value Ref Range    aPTT 40.4 (H) 26.2 - 38.6 sec   Protime-INR   Result Value Ref Range    Protime 39.6 (H) 9.9 - 12.7 sec    INR 3.33 (H) 0.88 - 1.12   Sample possible blood bank testing   Result Value Ref Range    Specimen Status KATHLEEN    Urinalysis, reflex to microscopic   Result Value Ref Range    Color, UA Yellow Straw/Yellow    Clarity, UA Clear Clear    Glucose, Ur Negative Negative mg/dL    Bilirubin Urine Negative Negative    Ketones, Urine Negative Negative mg/dL    Specific Gravity, UA 1.010 1.005 - 1.030    Blood, Urine TRACE-INTACT (A) Negative    pH, UA 6.0 5.0 - 8.0    Protein, UA Negative Negative mg/dL    Urobilinogen, Urine 0.2 <2.0 E.U./dL    Nitrite, Urine Negative Negative    Leukocyte Esterase, Urine Negative Negative    Microscopic Examination YES     Urine Type NotGiven    Microscopic Urinalysis   Result Value Ref Range    WBC, UA 0-2 0 - 5 /HPF    RBC, UA 0-2 0 - 4 /HPF   EKG 12 Lead   Result Value Ref Range    Ventricular Rate 92 BPM    Atrial Rate 92 BPM    P-R Interval 140 ms    QRS Duration 96 ms    Q-T Interval 360 ms    QTc Calculation (Bazett) 445 ms    P Axis 14 degrees    R Axis 42 degrees    T Axis 47 degrees    Diagnosis       Normal sinus rhythmNormal ECGWhen compared with ECG of 10-SEP-2020 07:17,Sinus rhythm has replaced Ectopic atrial rhythm       I have reviewed all labs for this visit. ECG  The Ekg interpreted by me shows  Normal sinus rhythm with a rate of 92  Axis is normal  QTc is normal  Intervals and Durations are unremarkable. ST Segments: Nonspecific ST changes  No significant change from prior EKG dated February 24, 2021    RADIOLOGY  CT ABDOMEN PELVIS W IV CONTRAST Additional Contrast? None    Result Date: 8/20/2021  EXAMINATION: CTA OF THE CHEST; CT OF THE ABDOMEN AND PELVIS WITH CONTRAST 8/20/2021 5:03 pm TECHNIQUE: CTA of the chest was performed after the administration of intravenous contrast.  Multiplanar reformatted images are provided for review. MIP images are provided for review. Dose modulation, iterative reconstruction, and/or weight based adjustment of the mA/kV was utilized to reduce the radiation dose to as low as reasonably achievable.; CT of the abdomen and pelvis was performed with the administration of intravenous contrast. Multiplanar reformatted images are provided for review. Dose modulation, iterative reconstruction, and/or weight based adjustment of the mA/kV was utilized to reduce the radiation dose to as low as reasonably achievable. COMPARISON: None HISTORY: ORDERING SYSTEM PROVIDED HISTORY:  Recent cardioversion. SOA and abdominal pain. V/D. TECHNOLOGIST PROVIDED HISTORY: Reason for Exam:  Recent cardioversion. SOA and abdominal pain. V/D. Decision Support Exception - unselect if not a suspected or confirmed emergency medical condition->Emergency Medical Condition (MA) Reason for Exam:  Patient states pain in LRQ and has had n/v/d for 2 days. Acuity:  Acute Type of Exam: Initial ORDERING SYSTEM PROVIDED HISTORY:  Recent cardioversion. SOA and abdominal pain.   V/D. TECHNOLOGIST PROVIDED HISTORY: Reason for Exam: a small amount of free pelvic fluid, simple in appearance. No inguinal or pelvic sidewall adenopathy. Peritoneum/Retroperitoneum: Atherosclerotic plaque is noted in the aorta and its branch vessels. No retroperitoneal adenopathy. Periumbilical hernia containing fat. No other areas of abnormal soft tissue swelling are identified. Bones/Soft Tissues: Multifocal degenerative changes are noted in the spine. No fracture or osseous destructive lesion. 1. Findings concerning for acute cholecystitis. 2. No pulmonary emboli or parenchymal lung infiltrates. 3. Mosaic perfusion in the lungs suggesting small airway disease in the (asthma, bronchiolitis, etc.). 4. Small amount of free pelvic fluid, likely reactive, simple in appearance. 5. Cortical thinning in the kidneys which may represent sequela of remote infection or vascular insult. 6. Atherosclerotic disease. XR CHEST PORTABLE    Result Date: 8/20/2021  EXAMINATION: ONE XRAY VIEW OF THE CHEST 8/20/2021 3:06 pm COMPARISON: Chest radiograph performed 09/25/2020. HISTORY: ORDERING SYSTEM PROVIDED HISTORY: other TECHNOLOGIST PROVIDED HISTORY: Reason for exam:->other Reason for Exam: sob Acuity: Acute Type of Exam: Initial FINDINGS: There is no acute consolidation or effusion. There is no pneumothorax. The mediastinal structures are unremarkable. The upper abdomen is unremarkable. The extrathoracic soft tissues are unremarkable. There is no acute osseous abnormality. No acute cardiopulmonary process. CT CHEST PULMONARY EMBOLISM W CONTRAST    Result Date: 8/20/2021  EXAMINATION: CTA OF THE CHEST; CT OF THE ABDOMEN AND PELVIS WITH CONTRAST 8/20/2021 5:03 pm TECHNIQUE: CTA of the chest was performed after the administration of intravenous contrast.  Multiplanar reformatted images are provided for review. MIP images are provided for review.  Dose modulation, iterative reconstruction, and/or weight based adjustment of the mA/kV was utilized to reduce the radiation dose to as low as reasonably achievable.; CT of the abdomen and pelvis was performed with the administration of intravenous contrast. Multiplanar reformatted images are provided for review. Dose modulation, iterative reconstruction, and/or weight based adjustment of the mA/kV was utilized to reduce the radiation dose to as low as reasonably achievable. COMPARISON: None HISTORY: ORDERING SYSTEM PROVIDED HISTORY:  Recent cardioversion. SOA and abdominal pain. V/D. TECHNOLOGIST PROVIDED HISTORY: Reason for Exam:  Recent cardioversion. SOA and abdominal pain. V/D. Decision Support Exception - unselect if not a suspected or confirmed emergency medical condition->Emergency Medical Condition (MA) Reason for Exam:  Patient states pain in LRQ and has had n/v/d for 2 days. Acuity:  Acute Type of Exam: Initial ORDERING SYSTEM PROVIDED HISTORY:  Recent cardioversion. SOA and abdominal pain. V/D. TECHNOLOGIST PROVIDED HISTORY: Reason for Exam:  Recent cardioversion. SOA and abdominal pain. V/D. Additional Contrast?  None Decision Support Exception - unselect if not a suspected or confirmed emergency medical condition->Emergency Medical Condition (MA) Reason for Exam:  Patient states pain in LRQ and has had n/v/d for 2 days. Acuity:  Acute Type of Exam:  Initial FINDINGS: CTA CHEST: PULMONARY ARTERIES: There no pulmonary emboli. Mediastinum: The heart size is normal.  Atherosclerotic plaque is noted along the aorta and its branch vessels. Thyroid gland is normal in appearance. No mediastinal adenopathy. No pericardial effusion. Lungs/Pleura: Mosaic perfusion is noted in the lungs suggesting underlying small airway disease (asthma, bronchiolitis, etc.). Atelectasis is noted in the lungs with no focal lung infiltrate. No pleural effusion or pneumothorax. Soft Tissues/Bones: Asymmetric degenerative changes are noted in the right glenohumeral joint.  There are heterotopic ossifications along the left 1st of 99.1, tachycardic to 110, and satting greater than 90% on room air. She had no significant leg swelling or calf tenderness. She did have tenderness to palpation throughout all 4 quadrants without guarding or rebound. Given her recent cardioversion, concern for possible PE and/or mesenteric embolus. Given this, CBC, CMP, D-dimer, troponin, urinalysis obtained. D-dimer is elevated. Although PT is within therapeutic level, as patient reports having nausea, vomiting, shortness of breath, and abdominal pain that is not normal for her, will obtain CT PE and CT abdomen pelvis. Reports having decreased p.o. intake since Monday due to decreased appetite. She was offered pain medication which she declined. She was given 1 L IV fluid bolus. Patient does have positive Lechuga sign. Magnesium 1 and patient was given repletion in the emergency department. Troponin negative. Lipase within normal limits. Lactate was normal at 1.4. No transaminitis present. Total bilirubin is elevated at 1.5. No leukocytosis present. CT concerning for acute cholecystitis. General surgery consulted. I spoke with Dr. Mi Christensen who recommends close follow-up as outpatient in clinic given no fever, no leukocytosis, and patient not requiring any pain medication. Discussed anticoagulation and Dr. Mi Christensen recommends continuing the anticoagulation until follow-up in clinic and they can determine if patient needs surgery. This was discussed with patient. Discussed the supratherapeutic INR. Patient was instructed to take half her warfarin dose tonight and to call her PCP tomorrow to inquire about warfarin adjustment. Patient agrees with plan. Patient remained stable during her stay in the emergency department. She was offered admission and patient declines and is comfortable with plan to be discharged home. Patient verbalized understanding to follow-up with the clinic on Monday or Tuesday.   She was given prescription of Norco and Zofran. She was discharged home with strict return precautions. Pt was seen during the [place] 19 pandemic. Appropriate PPE worn by ME during patient encounters. Pt seen during a time with constrained hospital bed capacity and other potential inpatient and outpatient resources were constrained due to the viral pandemic. During the patient's ED course, the patient was given:  Medications   0.9 % sodium chloride bolus (0 mLs Intravenous Stopped 8/20/21 2005)   magnesium sulfate 2000 mg in 50 mL IVPB premix (0 mg Intravenous Stopped 8/20/21 1939)   iopamidol (ISOVUE-370) 76 % injection 75 mL (75 mLs Intravenous Given 8/20/21 1753)        CLINICAL IMPRESSION  1. Abnormal CT scan, gallbladder        Blood pressure (!) 129/113, pulse 71, temperature 99 °F (37.2 °C), resp. rate 17, height 5' 2\" (1.575 m), weight 234 lb (106.1 kg), SpO2 98 %, not currently breastfeeding. DISPOSITION  [facility] was discharged home in stable condition. Patient was given scripts for the following medications. I counseled patient how to take these medications. Discharge Medication List as of 8/20/2021  7:42 PM      START taking these medications    Details   ondansetron (ZOFRAN-ODT) 4 MG disintegrating tablet Take 1 tablet by mouth 3 times daily as needed for Nausea or Vomiting, Disp-6 tablet, R-0Print      HYDROcodone-acetaminophen (NORCO) 5-325 MG per tablet Take 1 tablet by mouth every 6 hours as needed for Pain for up to 3 days. Intended supply: 3 days. Take lowest dose possible to manage pain, Disp-8 tablet, R-0Print             Follow-up with:  Glenetta Cockayne, MD  [facility]: [address] [facility] [address]  [phone]    In 3 days        DISCLAIMER: This chart was created using Dragon dictation software. Efforts were made by me to ensure accuracy, however some errors may be present due to limitations of this technology and occasionally words are not transcribed correctly.         Jena Amin MD  08/23/21 6049

## 2021-08-20 NOTE — ED NOTES
Patient identified as a positive fall risk on the ED triage fall screening. Patient placed in fall precautions which includes:  yellow fall risk bracelet on wrist,patient wearing shoes, \"Be Safe\" sign placed on patient's door, and bed alarm placed under patient/alarm turned on. Patient instructed on importance of not getting out of bed or ambulating without assistance for safety.              Aneudy Story RN  08/20/21 7799

## 2021-08-21 NOTE — ED NOTES
Pt able to eat most of ham sandwich and cristy crackers. Pt not feeling nauseous.        Reynaldo Almodovar RN  08/20/21 2005

## 2021-08-22 LAB
EKG ATRIAL RATE: 92 BPM
EKG DIAGNOSIS: NORMAL
EKG P AXIS: 14 DEGREES
EKG P-R INTERVAL: 140 MS
EKG Q-T INTERVAL: 360 MS
EKG QRS DURATION: 96 MS
EKG QTC CALCULATION (BAZETT): 445 MS
EKG R AXIS: 42 DEGREES
EKG T AXIS: 47 DEGREES
EKG VENTRICULAR RATE: 92 BPM

## 2021-08-23 ENCOUNTER — INITIAL CONSULT (OUTPATIENT)
Dept: SURGERY | Age: 71
End: 2021-08-23
Payer: MEDICARE

## 2021-08-23 VITALS
BODY MASS INDEX: 43.06 KG/M2 | WEIGHT: 234 LBS | SYSTOLIC BLOOD PRESSURE: 122 MMHG | DIASTOLIC BLOOD PRESSURE: 80 MMHG | HEIGHT: 62 IN

## 2021-08-23 DIAGNOSIS — R10.11 RUQ PAIN: Primary | ICD-10-CM

## 2021-08-23 PROCEDURE — 99204 OFFICE O/P NEW MOD 45 MIN: CPT | Performed by: SURGERY

## 2021-08-23 RX ORDER — AMOXICILLIN AND CLAVULANATE POTASSIUM 875; 125 MG/1; MG/1
1 TABLET, FILM COATED ORAL 2 TIMES DAILY
Qty: 14 TABLET | Refills: 0 | Status: SHIPPED | OUTPATIENT
Start: 2021-08-23 | End: 2021-08-30

## 2021-08-23 NOTE — PROGRESS NOTES
New Patient Via Parminder Miguel MD    [address], [address] [place] [address]  [place]  [phone]    Rosa Oscar   YOB: 1950    Date of Visit:  8/23/2021    Андрей Webb DO    Chief Complaint: Abdominal pain    HPI: Patient presents for evaluation of right upper quadrant abdominal pain. She states that this started earlier in the week. She has had associated nausea and some vomiting as well as diarrhea. The pain is a dull ache in her right midabdomen. It radiates around to her back. She denies fever but did have some chills. There is no apparent association with eating. She has never had pain like this before. Her pain has improved since she was seen in the emergency room    Allergies   Allergen Reactions    Furosemide      Other reaction(s): Dizziness    Hydrocodone-Acetaminophen Nausea Only    Sitagliptin Diarrhea    Morphine Nausea And Vomiting     Outpatient Medications Marked as Taking for the 8/23/21 encounter (Initial consult) with Balaji Villeda MD   Medication Sig Dispense Refill    amoxicillin-clavulanate (AUGMENTIN) 875-125 MG per tablet Take 1 tablet by mouth 2 times daily for 7 days 14 tablet 0    Probiotic Product (PROBIOTIC-10 PO) Take by mouth      ondansetron (ZOFRAN-ODT) 4 MG disintegrating tablet Take 1 tablet by mouth 3 times daily as needed for Nausea or Vomiting 6 tablet 0    HYDROcodone-acetaminophen (NORCO) 5-325 MG per tablet Take 1 tablet by mouth every 6 hours as needed for Pain for up to 3 days. Intended supply: 3 days.  Take lowest dose possible to manage pain 8 tablet 0    amoxicillin (AMOXIL) 500 MG capsule Take 4 capsules 1 hour prior to dental procedure 12 capsule 0    dilTIAZem (CARDIZEM CD) 120 MG extended release capsule Take 1 capsule by mouth daily 90 capsule 3    glipiZIDE (GLUCOTROL XL) 5 MG extended release tablet Take 5 mg by mouth daily      Cholecalciferol (VITAMIN D-3) 25 MCG (1000 UT) CAPS Take by mouth daily      hydrochlorothiazide (HYDRODIURIL) 25 MG tablet Take 1 tablet by mouth daily 90 tablet 3    atorvastatin (LIPITOR) 40 MG tablet Take 40 mg by mouth nightly       warfarin (COUMADIN) 2 MG tablet Take 2 tablets by mouth daily 60 tablet 0    metFORMIN (GLUCOPHAGE) 500 MG tablet Take 1,000 mg by mouth 2 times daily (with meals)       losartan (COZAAR) 100 MG tablet Take 100 mg by mouth daily.          Past Medical History:   Diagnosis Date    Atrial fibrillation ([facility]     Diabetes mellitus ([facility]     Hyperlipidemia     Hypertension     Olecranon bursitis, left elbow 4/13/2017    FADI (obstructive sleep apnea)     CAN'T TOLERATE DEVICE     Past Surgical History:   Procedure Laterality Date    COLONOSCOPY      EYE SURGERY Bilateral     cataracts    FINGER TRIGGER RELEASE Right 10/04/2017    long    HYSTERECTOMY      JOINT REPLACEMENT Bilateral     bilateral knee replacements    OTHER SURGICAL HISTORY  11/12/2014    phacoemulsification of cataract with intraocular lens implant right eye    ROTATOR CUFF REPAIR Bilateral     bilateral rotator cuff repair    TONSILLECTOMY      TOTAL HIP ARTHROPLASTY Left 9/24/2020    LEFT TOTAL HIP REPLACEMENT WITH Inocencio Bile & NEPHEW performed by Macho Cabrera MD at [address]       Family History   Problem Relation Age of Onset    Cancer Mother         lung    Cancer Father         lung     Social History     Socioeconomic History    Marital status:      Spouse name: Not on file    Number of children: Not on file    Years of education: Not on file    Highest education level: Not on file   Occupational History    Occupation: Retired   Tobacco Use    Smoking status: Never Smoker    Smokeless tobacco: Never Used   Vaping Use    Vaping Use: Never used   Substance and Sexual Activity    Alcohol use: No     Alcohol/week: 0.0 standard drinks    Drug use: No    Sexual activity: Not on file   Other Topics Concern    Not on file   Social History Narrative    Not on file     Social Determinants of Health     Financial Resource Strain:     Difficulty of Paying Living Expenses:    Food Insecurity:     Worried About Running Out of Food in the Last Year:     [address] in the Last Year:    Transportation Needs:     Lack of Transportation (Medical):  Lack of Transportation (Non-Medical):    Physical Activity:     Days of Exercise per Week:     Minutes of Exercise per Session:    Stress:     Feeling of Stress :    Social Connections:     Frequency of Communication with Friends and Family:     Frequency of Social Gatherings with Friends and Family:     Attends Gnosticism Services:     Active Member of Clubs or Organizations:     Attends Club or Organization Meetings:     Marital Status:    Intimate Partner Violence:     Fear of Current or Ex-Partner:     Emotionally Abused:     Physically Abused:     Sexually Abused:           Vitals:    08/23/21 1021   BP: 122/80   Site: Left Upper Arm   Position: Sitting   Cuff Size: Large Adult   Weight: 234 lb (106.1 kg)   Height: 5' 2\" (1.575 m)     Body mass index is 42.8 kg/m². Wt Readings from Last 3 Encounters:   08/23/21 234 lb (106.1 kg)   08/20/21 234 lb (106.1 kg)   02/09/21 228 lb (103.4 kg)     BP Readings from Last 3 Encounters:   08/23/21 122/80   08/20/21 (!) 129/113   02/09/21 118/60        REVIEW OF SYSTEMS:  CONSTITUTIONAL:  positive for  chills  HEENT:  Negative  RESPIRATORY:  negative  CARDIOVASCULAR:  positive for  palpitations  GASTROINTESTINAL:  positive for nausea, vomiting, diarrhea and abdominal pain  GENITOURINARY:  negative  HEMATOLOGIC/LYMPHATIC:  negative  ENDOCRINE:  Negative  NEUROLOGICAL:  Negative  * All other ROS reviewed and negative.      PE:  Constitutional:  Well developed, well nourished, no acute distress, non-toxic appearance   Eyes:  PERRL, conjunctiva normal   HENT:  Atraumatic, external ears normal, nose normal. Neck- normal range of motion, no tenderness, supple   Respiratory:  No respiratory distress, normal breath sounds, no rales, no wheezing   Cardiovascular:  Normal rate, normal rhythm  GI: Bowel sounds positive, soft, mild tenderness in the right midabdomen  :  No costovertebral angle tenderness   Integument:  Well hydrated, no rash   Lymphatic:  No lymphadenopathy noted   Neurologic:  Alert & oriented x 3, no focal deficits noted   Psychiatric:  Speech and behavior appropriate       DATA:  CBC with Differential:    Lab Results   Component Value Date    WBC 10.8 08/20/2021    RBC 4.74 08/20/2021    HGB 12.9 08/20/2021    HCT 38.2 08/20/2021     08/20/2021    MCV 80.6 08/20/2021    MCH 27.3 08/20/2021    MCHC 33.9 08/20/2021    RDW 14.1 08/20/2021    LYMPHOPCT 14.0 08/20/2021    MONOPCT 8.5 08/20/2021    BASOPCT 1.3 08/20/2021    MONOSABS 0.9 08/20/2021    LYMPHSABS 1.5 08/20/2021    EOSABS 0.1 08/20/2021    BASOSABS 0.1 08/20/2021     CMP:    Lab Results   Component Value Date     08/20/2021    K 3.5 08/20/2021    K 4.6 09/25/2020    CL 97 08/20/2021    CO2 24 08/20/2021    BUN 16 08/20/2021    CREATININE 0.7 08/20/2021    GFRAA >60 08/20/2021    AGRATIO 1.2 08/20/2021    LABGLOM >60 08/20/2021    GLUCOSE 97 08/20/2021    PROT 7.4 08/20/2021    LABALBU 4.0 08/20/2021    CALCIUM 9.4 08/20/2021    BILITOT 1.5 08/20/2021    ALKPHOS 64 08/20/2021    AST 15 08/20/2021    ALT 12 08/20/2021     Radiology Review: CT images reviewed and show some inflammatory changes at the hepatic flexure around the gallbladder      Assessment:  1. RUQ pain        Plan: The etiology of her pain is unclear. It could be cholecystitis or colitis. She was not treated with antibiotics. I have sent in a prescription to her pharmacy for Augmentin. We will get her set up for a gallbladder ultrasound. Further treatment plan will based on the results of the imaging.  If she does not meeting surgery, this will likely be delayed as her cardiologist did not want her off her anticoagulation for at least 6 weeks after her cardioversion that was done last week

## 2021-08-24 ENCOUNTER — HOSPITAL ENCOUNTER (OUTPATIENT)
Dept: ULTRASOUND IMAGING | Age: 71
Discharge: HOME | End: 2021-08-24
Payer: MEDICARE

## 2021-08-24 DIAGNOSIS — R10.11 RUQ PAIN: ICD-10-CM

## 2021-08-24 PROCEDURE — 76705 ECHO EXAM OF ABDOMEN: CPT

## 2021-08-26 ENCOUNTER — TELEPHONE (OUTPATIENT)
Dept: SURGERY | Age: 71
End: 2021-08-26

## 2021-08-26 NOTE — TELEPHONE ENCOUNTER
----- Message from Vangie Tabares MD sent at 8/26/2021 12:11 PM EDT -----  Needs her GB out.  Please check with her cardiologist to find out how long we have to wait after her recent cardioversion to hold her anticoagulation

## 2021-08-26 NOTE — TELEPHONE ENCOUNTER
I called and spoke with pt advised we will need to get her scheduled for Gallbladder removal, will contact her cardiologist Dr. Rachel Alonso, [phone] for instructions.

## 2021-08-31 ENCOUNTER — TELEPHONE (OUTPATIENT)
Dept: SURGERY | Age: 71
End: 2021-08-31

## 2021-09-02 ENCOUNTER — TELEPHONE (OUTPATIENT)
Dept: SURGERY | Age: 71
End: 2021-09-02

## 2021-09-02 NOTE — TELEPHONE ENCOUNTER
I called and spoke with pt, advised surgery 9/10/21 9:30 arrival time for 11:30 case, NPO, hold coumadin for 5 days prior per cardiologist instructions. I reviewed her chart, allergies and medications, daily meds with a small sip of water. Pt will have covid test, and she will have a  directions given.

## 2021-09-03 DIAGNOSIS — Z01.818 PRE-OP TESTING: Primary | ICD-10-CM

## 2021-09-06 ENCOUNTER — HOSPITAL ENCOUNTER (OUTPATIENT)
Age: 71
Discharge: HOME | End: 2021-09-06
Payer: MEDICARE

## 2021-09-06 PROCEDURE — U0005 INFEC AGEN DETEC AMPLI PROBE: HCPCS

## 2021-09-06 PROCEDURE — U0003 INFECTIOUS AGENT DETECTION BY NUCLEIC ACID (DNA OR RNA); SEVERE ACUTE RESPIRATORY SYNDROME CORONAVIRUS 2 (SARS-COV-2) (CORONAVIRUS DISEASE [COVID-19]), AMPLIFIED PROBE TECHNIQUE, MAKING USE OF HIGH THROUGHPUT TECHNOLOGIES AS DESCRIBED BY CMS-2020-01-R: HCPCS

## 2021-09-07 LAB — SARS-COV-2, PCR: NOT DETECTED

## 2021-09-07 NOTE — PROGRESS NOTES
PAT completed with patient no orders with chart or in Epic will check with MD RASCON. Adia Finley RN

## 2021-09-07 NOTE — PROGRESS NOTES
[facility] Preoperative Screening for Elective Surgery/Invasive Procedures While COVID-19 present in the community     Have you had any of the following symptoms? o Fever, chills  o Cough  o Shortness of breath  o Muscle aches/pain  o Diarrhea  o Abdominal pain, nausea, vomiting  o Loss or decrease in taste and / or smell   Risk of Exposure  o Have you recently been hospitalized for COVID-19 or flu-like illness, if so when?  o Recently diagnosed with COVID-19, if so when?  o Recently tested for COVID-19, if so when?  o Have you been in close contact with a person or family member who currently has or recently had COVID-19? If yes, when and in what context?  o Do you live with anybody who in the last 14 days has had fever, chills, shortness of breath, muscle aches, flu-like illness?  o Do you have any close contacts or family members who are currently in the hospital for COVID-19 or flu-like illness? If yes, assess recent close contact with this person. Indicate if the patient has a positive screen by answering yes to one or more of the above questions. Patients who test positive or screen positive prior to surgery or on the day of surgery should be evaluated in conjunction with the surgeon/proceduralist/anesthesiologist to determine the urgency of the procedure.

## 2021-09-07 NOTE — PROGRESS NOTES

## 2021-09-09 ENCOUNTER — ANESTHESIA EVENT (OUTPATIENT)
Dept: OPERATING ROOM | Age: 71
End: 2021-09-09
Payer: MEDICARE

## 2021-09-09 NOTE — PROGRESS NOTES
Notified pt to be here at [address] [place] [address] Rd., [address] on 9/10/21 for surgery.

## 2021-09-10 ENCOUNTER — ANESTHESIA (OUTPATIENT)
Dept: OPERATING ROOM | Age: 71
End: 2021-09-10
Payer: MEDICARE

## 2021-09-10 ENCOUNTER — HOSPITAL ENCOUNTER (OUTPATIENT)
Age: 71
Setting detail: OUTPATIENT SURGERY
Discharge: HOME | End: 2021-09-10
Attending: SURGERY | Admitting: SURGERY
Payer: MEDICARE

## 2021-09-10 VITALS
WEIGHT: 226 LBS | SYSTOLIC BLOOD PRESSURE: 153 MMHG | OXYGEN SATURATION: 98 % | HEIGHT: 61 IN | DIASTOLIC BLOOD PRESSURE: 62 MMHG | BODY MASS INDEX: 42.67 KG/M2 | HEART RATE: 69 BPM | RESPIRATION RATE: 12 BRPM | TEMPERATURE: 97.2 F

## 2021-09-10 VITALS — TEMPERATURE: 97 F | OXYGEN SATURATION: 93 % | DIASTOLIC BLOOD PRESSURE: 68 MMHG | SYSTOLIC BLOOD PRESSURE: 152 MMHG

## 2021-09-10 DIAGNOSIS — K81.9 CHOLECYSTITIS: Primary | ICD-10-CM

## 2021-09-10 LAB
APTT: 35.6 SEC (ref 26.2–38.6)
GLUCOSE BLD-MCNC: 137 MG/DL (ref 70–99)
GLUCOSE BLD-MCNC: 181 MG/DL (ref 70–99)
INR BLD: 1.26 (ref 0.88–1.12)
PERFORMED ON: ABNORMAL
PERFORMED ON: ABNORMAL
PROTHROMBIN TIME: 14.4 SEC (ref 9.9–12.7)

## 2021-09-10 PROCEDURE — 85730 THROMBOPLASTIN TIME PARTIAL: CPT

## 2021-09-10 PROCEDURE — 2580000003 HC RX 258: Performed by: SURGERY

## 2021-09-10 PROCEDURE — 2709999900 HC NON-CHARGEABLE SUPPLY: Performed by: SURGERY

## 2021-09-10 PROCEDURE — 3700000001 HC ADD 15 MINUTES (ANESTHESIA): Performed by: SURGERY

## 2021-09-10 PROCEDURE — 3700000000 HC ANESTHESIA ATTENDED CARE: Performed by: SURGERY

## 2021-09-10 PROCEDURE — 6360000002 HC RX W HCPCS: Performed by: SURGERY

## 2021-09-10 PROCEDURE — 47562 LAPAROSCOPIC CHOLECYSTECTOMY: CPT | Performed by: SURGERY

## 2021-09-10 PROCEDURE — 2500000003 HC RX 250 WO HCPCS: Performed by: ANESTHESIOLOGY

## 2021-09-10 PROCEDURE — 85610 PROTHROMBIN TIME: CPT

## 2021-09-10 PROCEDURE — 2580000003 HC RX 258: Performed by: ANESTHESIOLOGY

## 2021-09-10 PROCEDURE — 2500000003 HC RX 250 WO HCPCS: Performed by: SURGERY

## 2021-09-10 PROCEDURE — 2500000003 HC RX 250 WO HCPCS

## 2021-09-10 PROCEDURE — 3600000009 HC SURGERY ROBOT BASE: Performed by: SURGERY

## 2021-09-10 PROCEDURE — 7100000000 HC PACU RECOVERY - FIRST 15 MIN: Performed by: SURGERY

## 2021-09-10 PROCEDURE — 7100000011 HC PHASE II RECOVERY - ADDTL 15 MIN: Performed by: SURGERY

## 2021-09-10 PROCEDURE — 6360000002 HC RX W HCPCS

## 2021-09-10 PROCEDURE — 3600000019 HC SURGERY ROBOT ADDTL 15MIN: Performed by: SURGERY

## 2021-09-10 PROCEDURE — 88304 TISSUE EXAM BY PATHOLOGIST: CPT

## 2021-09-10 PROCEDURE — 36415 COLL VENOUS BLD VENIPUNCTURE: CPT

## 2021-09-10 PROCEDURE — 6360000002 HC RX W HCPCS: Performed by: ANESTHESIOLOGY

## 2021-09-10 PROCEDURE — 7100000001 HC PACU RECOVERY - ADDTL 15 MIN: Performed by: SURGERY

## 2021-09-10 PROCEDURE — S2900 ROBOTIC SURGICAL SYSTEM: HCPCS | Performed by: SURGERY

## 2021-09-10 PROCEDURE — 7100000010 HC PHASE II RECOVERY - FIRST 15 MIN: Performed by: SURGERY

## 2021-09-10 RX ORDER — LIDOCAINE HYDROCHLORIDE 20 MG/ML
INJECTION, SOLUTION EPIDURAL; INFILTRATION; INTRACAUDAL; PERINEURAL PRN
Status: DISCONTINUED | OUTPATIENT
Start: 2021-09-10 | End: 2021-09-10 | Stop reason: SDUPTHER

## 2021-09-10 RX ORDER — INDOCYANINE GREEN AND WATER 25 MG
KIT INJECTION
Status: COMPLETED
Start: 2021-09-10 | End: 2021-09-10

## 2021-09-10 RX ORDER — APREPITANT 40 MG/1
40 CAPSULE ORAL ONCE
Status: COMPLETED | OUTPATIENT
Start: 2021-09-10 | End: 2021-09-10

## 2021-09-10 RX ORDER — LABETALOL HYDROCHLORIDE 5 MG/ML
5 INJECTION, SOLUTION INTRAVENOUS EVERY 10 MIN PRN
Status: DISCONTINUED | OUTPATIENT
Start: 2021-09-10 | End: 2021-09-10 | Stop reason: HOSPADM

## 2021-09-10 RX ORDER — LIDOCAINE HYDROCHLORIDE 10 MG/ML
1 INJECTION, SOLUTION EPIDURAL; INFILTRATION; INTRACAUDAL; PERINEURAL
Status: COMPLETED | OUTPATIENT
Start: 2021-09-10 | End: 2021-09-10

## 2021-09-10 RX ORDER — HYDROMORPHONE HCL 110MG/55ML
PATIENT CONTROLLED ANALGESIA SYRINGE INTRAVENOUS PRN
Status: DISCONTINUED | OUTPATIENT
Start: 2021-09-10 | End: 2021-09-10 | Stop reason: SDUPTHER

## 2021-09-10 RX ORDER — HYDRALAZINE HYDROCHLORIDE 20 MG/ML
5 INJECTION INTRAMUSCULAR; INTRAVENOUS EVERY 10 MIN PRN
Status: DISCONTINUED | OUTPATIENT
Start: 2021-09-10 | End: 2021-09-10 | Stop reason: HOSPADM

## 2021-09-10 RX ORDER — KETOROLAC TROMETHAMINE 30 MG/ML
INJECTION, SOLUTION INTRAMUSCULAR; INTRAVENOUS PRN
Status: DISCONTINUED | OUTPATIENT
Start: 2021-09-10 | End: 2021-09-10 | Stop reason: SDUPTHER

## 2021-09-10 RX ORDER — PHENYLEPHRINE HCL IN 0.9% NACL 1 MG/10 ML
SYRINGE (ML) INTRAVENOUS PRN
Status: DISCONTINUED | OUTPATIENT
Start: 2021-09-10 | End: 2021-09-10 | Stop reason: SDUPTHER

## 2021-09-10 RX ORDER — SODIUM CHLORIDE, SODIUM LACTATE, POTASSIUM CHLORIDE, AND CALCIUM CHLORIDE .6; .31; .03; .02 G/100ML; G/100ML; G/100ML; G/100ML
IRRIGANT IRRIGATION PRN
Status: DISCONTINUED | OUTPATIENT
Start: 2021-09-10 | End: 2021-09-10 | Stop reason: ALTCHOICE

## 2021-09-10 RX ORDER — BUPIVACAINE HYDROCHLORIDE 5 MG/ML
INJECTION, SOLUTION EPIDURAL; INTRACAUDAL PRN
Status: DISCONTINUED | OUTPATIENT
Start: 2021-09-10 | End: 2021-09-10 | Stop reason: ALTCHOICE

## 2021-09-10 RX ORDER — SODIUM CHLORIDE 0.9 % (FLUSH) 0.9 %
10 SYRINGE (ML) INJECTION EVERY 12 HOURS SCHEDULED
Status: DISCONTINUED | OUTPATIENT
Start: 2021-09-10 | End: 2021-09-10 | Stop reason: HOSPADM

## 2021-09-10 RX ORDER — FENTANYL CITRATE 50 UG/ML
INJECTION, SOLUTION INTRAMUSCULAR; INTRAVENOUS PRN
Status: DISCONTINUED | OUTPATIENT
Start: 2021-09-10 | End: 2021-09-10 | Stop reason: SDUPTHER

## 2021-09-10 RX ORDER — INDOCYANINE GREEN AND WATER 25 MG
5 KIT INJECTION ONCE
Status: COMPLETED | OUTPATIENT
Start: 2021-09-10 | End: 2021-09-10

## 2021-09-10 RX ORDER — MORPHINE SULFATE 2 MG/ML
1 INJECTION, SOLUTION INTRAMUSCULAR; INTRAVENOUS EVERY 5 MIN PRN
Status: DISCONTINUED | OUTPATIENT
Start: 2021-09-10 | End: 2021-09-10 | Stop reason: HOSPADM

## 2021-09-10 RX ORDER — PROPOFOL 10 MG/ML
INJECTION, EMULSION INTRAVENOUS PRN
Status: DISCONTINUED | OUTPATIENT
Start: 2021-09-10 | End: 2021-09-10 | Stop reason: SDUPTHER

## 2021-09-10 RX ORDER — SODIUM CHLORIDE 9 MG/ML
25 INJECTION, SOLUTION INTRAVENOUS PRN
Status: DISCONTINUED | OUTPATIENT
Start: 2021-09-10 | End: 2021-09-10 | Stop reason: HOSPADM

## 2021-09-10 RX ORDER — PROMETHAZINE HYDROCHLORIDE 25 MG/ML
6.25 INJECTION, SOLUTION INTRAMUSCULAR; INTRAVENOUS
Status: DISCONTINUED | OUTPATIENT
Start: 2021-09-10 | End: 2021-09-10 | Stop reason: HOSPADM

## 2021-09-10 RX ORDER — ONDANSETRON 4 MG/1
4 TABLET, FILM COATED ORAL 3 TIMES DAILY PRN
Qty: 15 TABLET | Refills: 0 | Status: SHIPPED | OUTPATIENT
Start: 2021-09-10

## 2021-09-10 RX ORDER — HEPARIN SODIUM 5000 [USP'U]/ML
5000 INJECTION, SOLUTION INTRAVENOUS; SUBCUTANEOUS ONCE
Status: COMPLETED | OUTPATIENT
Start: 2021-09-10 | End: 2021-09-10

## 2021-09-10 RX ORDER — SODIUM CHLORIDE, SODIUM LACTATE, POTASSIUM CHLORIDE, CALCIUM CHLORIDE 600; 310; 30; 20 MG/100ML; MG/100ML; MG/100ML; MG/100ML
INJECTION, SOLUTION INTRAVENOUS CONTINUOUS
Status: DISCONTINUED | OUTPATIENT
Start: 2021-09-10 | End: 2021-09-10 | Stop reason: HOSPADM

## 2021-09-10 RX ORDER — ROCURONIUM BROMIDE 10 MG/ML
INJECTION, SOLUTION INTRAVENOUS PRN
Status: DISCONTINUED | OUTPATIENT
Start: 2021-09-10 | End: 2021-09-10 | Stop reason: SDUPTHER

## 2021-09-10 RX ORDER — MEPERIDINE HYDROCHLORIDE 25 MG/ML
12.5 INJECTION INTRAMUSCULAR; INTRAVENOUS; SUBCUTANEOUS EVERY 5 MIN PRN
Status: DISCONTINUED | OUTPATIENT
Start: 2021-09-10 | End: 2021-09-10 | Stop reason: HOSPADM

## 2021-09-10 RX ORDER — OXYCODONE HYDROCHLORIDE AND ACETAMINOPHEN 5; 325 MG/1; MG/1
1 TABLET ORAL PRN
Status: DISCONTINUED | OUTPATIENT
Start: 2021-09-10 | End: 2021-09-10 | Stop reason: HOSPADM

## 2021-09-10 RX ORDER — SODIUM CHLORIDE 0.9 % (FLUSH) 0.9 %
10 SYRINGE (ML) INJECTION PRN
Status: DISCONTINUED | OUTPATIENT
Start: 2021-09-10 | End: 2021-09-10 | Stop reason: HOSPADM

## 2021-09-10 RX ORDER — DIPHENHYDRAMINE HYDROCHLORIDE 50 MG/ML
12.5 INJECTION INTRAMUSCULAR; INTRAVENOUS
Status: DISCONTINUED | OUTPATIENT
Start: 2021-09-10 | End: 2021-09-10 | Stop reason: HOSPADM

## 2021-09-10 RX ORDER — ONDANSETRON 2 MG/ML
4 INJECTION INTRAMUSCULAR; INTRAVENOUS
Status: COMPLETED | OUTPATIENT
Start: 2021-09-10 | End: 2021-09-10

## 2021-09-10 RX ORDER — ONDANSETRON 2 MG/ML
INJECTION INTRAMUSCULAR; INTRAVENOUS PRN
Status: DISCONTINUED | OUTPATIENT
Start: 2021-09-10 | End: 2021-09-10 | Stop reason: SDUPTHER

## 2021-09-10 RX ORDER — OXYCODONE HYDROCHLORIDE AND ACETAMINOPHEN 5; 325 MG/1; MG/1
2 TABLET ORAL PRN
Status: DISCONTINUED | OUTPATIENT
Start: 2021-09-10 | End: 2021-09-10 | Stop reason: HOSPADM

## 2021-09-10 RX ORDER — MORPHINE SULFATE 2 MG/ML
2 INJECTION, SOLUTION INTRAMUSCULAR; INTRAVENOUS EVERY 5 MIN PRN
Status: DISCONTINUED | OUTPATIENT
Start: 2021-09-10 | End: 2021-09-10 | Stop reason: HOSPADM

## 2021-09-10 RX ORDER — MAGNESIUM HYDROXIDE 1200 MG/15ML
LIQUID ORAL CONTINUOUS PRN
Status: COMPLETED | OUTPATIENT
Start: 2021-09-10 | End: 2021-09-10

## 2021-09-10 RX ORDER — DEXAMETHASONE SODIUM PHOSPHATE 4 MG/ML
INJECTION, SOLUTION INTRA-ARTICULAR; INTRALESIONAL; INTRAMUSCULAR; INTRAVENOUS; SOFT TISSUE PRN
Status: DISCONTINUED | OUTPATIENT
Start: 2021-09-10 | End: 2021-09-10 | Stop reason: SDUPTHER

## 2021-09-10 RX ORDER — DIPHENHYDRAMINE HYDROCHLORIDE 50 MG/ML
INJECTION INTRAMUSCULAR; INTRAVENOUS PRN
Status: DISCONTINUED | OUTPATIENT
Start: 2021-09-10 | End: 2021-09-10 | Stop reason: SDUPTHER

## 2021-09-10 RX ORDER — OXYCODONE HYDROCHLORIDE AND ACETAMINOPHEN 5; 325 MG/1; MG/1
1 TABLET ORAL EVERY 6 HOURS PRN
Qty: 20 TABLET | Refills: 0 | Status: SHIPPED | OUTPATIENT
Start: 2021-09-10 | End: 2021-09-15

## 2021-09-10 RX ADMIN — DIPHENHYDRAMINE HYDROCHLORIDE 12.5 MG: 50 INJECTION, SOLUTION INTRAMUSCULAR; INTRAVENOUS at 08:39

## 2021-09-10 RX ADMIN — ROCURONIUM BROMIDE 60 MG: 10 INJECTION, SOLUTION INTRAVENOUS at 08:19

## 2021-09-10 RX ADMIN — SODIUM CHLORIDE, POTASSIUM CHLORIDE, SODIUM LACTATE AND CALCIUM CHLORIDE: 600; 310; 30; 20 INJECTION, SOLUTION INTRAVENOUS at 07:10

## 2021-09-10 RX ADMIN — HEPARIN SODIUM 5000 UNITS: 5000 INJECTION INTRAVENOUS; SUBCUTANEOUS at 07:30

## 2021-09-10 RX ADMIN — DEXAMETHASONE SODIUM PHOSPHATE 4 MG: 4 INJECTION, SOLUTION INTRAMUSCULAR; INTRAVENOUS at 08:39

## 2021-09-10 RX ADMIN — KETOROLAC TROMETHAMINE 30 MG: 30 INJECTION, SOLUTION INTRAMUSCULAR at 09:02

## 2021-09-10 RX ADMIN — APREPITANT 40 MG: 40 CAPSULE ORAL at 07:12

## 2021-09-10 RX ADMIN — SUGAMMADEX 300 MG: 100 INJECTION, SOLUTION INTRAVENOUS at 09:06

## 2021-09-10 RX ADMIN — ONDANSETRON HYDROCHLORIDE 4 MG: 2 INJECTION, SOLUTION INTRAMUSCULAR; INTRAVENOUS at 10:49

## 2021-09-10 RX ADMIN — HYDROMORPHONE HYDROCHLORIDE 0.4 MG: 2 INJECTION, SOLUTION INTRAMUSCULAR; INTRAVENOUS; SUBCUTANEOUS at 09:14

## 2021-09-10 RX ADMIN — PROPOFOL 200 MG: 10 INJECTION, EMULSION INTRAVENOUS at 08:19

## 2021-09-10 RX ADMIN — SODIUM CHLORIDE, POTASSIUM CHLORIDE, SODIUM LACTATE AND CALCIUM CHLORIDE: 600; 310; 30; 20 INJECTION, SOLUTION INTRAVENOUS at 09:13

## 2021-09-10 RX ADMIN — INDOCYANINE GREEN AND WATER 5 MG: KIT at 07:30

## 2021-09-10 RX ADMIN — Medication 2000 MG: at 08:02

## 2021-09-10 RX ADMIN — ONDANSETRON 4 MG: 2 INJECTION, SOLUTION INTRAMUSCULAR; INTRAVENOUS at 08:39

## 2021-09-10 RX ADMIN — HYDROMORPHONE HYDROCHLORIDE 0.4 MG: 2 INJECTION, SOLUTION INTRAMUSCULAR; INTRAVENOUS; SUBCUTANEOUS at 09:18

## 2021-09-10 RX ADMIN — LIDOCAINE HYDROCHLORIDE 1 ML: 10 INJECTION, SOLUTION EPIDURAL; INFILTRATION; INTRACAUDAL; PERINEURAL at 07:10

## 2021-09-10 RX ADMIN — LIDOCAINE HYDROCHLORIDE 80 MG: 20 INJECTION, SOLUTION EPIDURAL; INFILTRATION; INTRACAUDAL; PERINEURAL at 08:18

## 2021-09-10 RX ADMIN — HYDROMORPHONE HYDROCHLORIDE 0.6 MG: 2 INJECTION, SOLUTION INTRAMUSCULAR; INTRAVENOUS; SUBCUTANEOUS at 08:44

## 2021-09-10 RX ADMIN — Medication 200 MCG: at 08:27

## 2021-09-10 RX ADMIN — FENTANYL CITRATE 100 MCG: 50 INJECTION INTRAMUSCULAR; INTRAVENOUS at 08:18

## 2021-09-10 ASSESSMENT — PULMONARY FUNCTION TESTS
PIF_VALUE: 21
PIF_VALUE: 32
PIF_VALUE: 31
PIF_VALUE: 3
PIF_VALUE: 32
PIF_VALUE: 25
PIF_VALUE: 20
PIF_VALUE: 32
PIF_VALUE: 28
PIF_VALUE: 30
PIF_VALUE: 1
PIF_VALUE: 2
PIF_VALUE: 24
PIF_VALUE: 31
PIF_VALUE: 2
PIF_VALUE: 24
PIF_VALUE: 31
PIF_VALUE: 32
PIF_VALUE: 31
PIF_VALUE: 2
PIF_VALUE: 31
PIF_VALUE: 32
PIF_VALUE: 23
PIF_VALUE: 1
PIF_VALUE: 11
PIF_VALUE: 2
PIF_VALUE: 2
PIF_VALUE: 0
PIF_VALUE: 31
PIF_VALUE: 32
PIF_VALUE: 24
PIF_VALUE: 23
PIF_VALUE: 23
PIF_VALUE: 32
PIF_VALUE: 31
PIF_VALUE: 23
PIF_VALUE: 23
PIF_VALUE: 1
PIF_VALUE: 1
PIF_VALUE: 4
PIF_VALUE: 1
PIF_VALUE: 23
PIF_VALUE: 25
PIF_VALUE: 24
PIF_VALUE: 23
PIF_VALUE: 6
PIF_VALUE: 24
PIF_VALUE: 4
PIF_VALUE: 1
PIF_VALUE: 24
PIF_VALUE: 32
PIF_VALUE: 31
PIF_VALUE: 31
PIF_VALUE: 21
PIF_VALUE: 24
PIF_VALUE: 1
PIF_VALUE: 0
PIF_VALUE: 31
PIF_VALUE: 4
PIF_VALUE: 3
PIF_VALUE: 0
PIF_VALUE: 32
PIF_VALUE: 1
PIF_VALUE: 29
PIF_VALUE: 31
PIF_VALUE: 2
PIF_VALUE: 33
PIF_VALUE: 31
PIF_VALUE: 23
PIF_VALUE: 32
PIF_VALUE: 23
PIF_VALUE: 25
PIF_VALUE: 2
PIF_VALUE: 31
PIF_VALUE: 32

## 2021-09-10 ASSESSMENT — PAIN - FUNCTIONAL ASSESSMENT
PAIN_FUNCTIONAL_ASSESSMENT: 0-10
PAIN_FUNCTIONAL_ASSESSMENT: 0-10

## 2021-09-10 NOTE — ANESTHESIA PRE PROCEDURE
Department of Anesthesiology  Preprocedure Note       Name:  Salbador Reich   Age:  66 y.o.  :  1950                                          MRN:  [id number]         Date:  9/10/2021      Surgeon: Cynthia Dee):  Bryant New MD    Procedure: Procedure(s):  ROBOTIC CHOLECYSTECTOMY, POSSIBLE OPEN PROCEDURE    Medications prior to admission:   Prior to Admission medications    Medication Sig Start Date End Date Taking? Authorizing Provider   Probiotic Product (PROBIOTIC-10 PO) Take by mouth   Yes Historical Provider, MD   glipiZIDE (GLUCOTROL XL) 5 MG extended release tablet Take 5 mg by mouth daily   Yes Historical Provider, MD   Cholecalciferol (VITAMIN D-3) 25 MCG (1000 UT) CAPS Take by mouth daily   Yes Historical Provider, MD   hydrochlorothiazide (HYDRODIURIL) 25 MG tablet Take 1 tablet by mouth daily 10/2/18  Yes KIMO Nelson CNP   atorvastatin (LIPITOR) 40 MG tablet Take 40 mg by mouth nightly  17  Yes Historical Provider, MD   metFORMIN (GLUCOPHAGE) 500 MG tablet Take 1,000 mg by mouth 2 times daily (with meals)    Yes Historical Provider, MD   losartan (COZAAR) 100 MG tablet Take 100 mg by mouth daily.    Yes Historical Provider, MD   warfarin (COUMADIN) 2 MG tablet Take 2 tablets by mouth daily 16   KIMO Nelson CNP       Current medications:    Current Facility-Administered Medications   Medication Dose Route Frequency Provider Last Rate Last Admin    lactated ringers infusion   IntraVENous Continuous Adrianne Gonzalez  mL/hr at 09/10/21 0710 New Bag at 09/10/21 0710    sodium chloride flush 0.9 % injection 10 mL  10 mL IntraVENous 2 times per day Adrianne Gonzalez MD        sodium chloride flush 0.9 % injection 10 mL  10 mL IntraVENous PRN Adrianne Gonzalez MD        0.9 % sodium chloride infusion  25 mL IntraVENous PRN Adrianne Gonzalez MD        ceFAZolin (ANCEF) 2000 mg in sterile water 20 mL IV syringe  2,000 mg IntraVENous Once Bryant New MD Allergies:     Allergies   Allergen Reactions    Furosemide      Other reaction(s): Dizziness    Hydrocodone-Acetaminophen Nausea Only    Sitagliptin Diarrhea    Morphine Nausea And Vomiting       Problem List:    Patient Active Problem List   Diagnosis Code    Obesity, Class III, BMI 40-49.9 (morbid obesity) ([facility]) E66.01    Diabetes ([facility] E11.9    Essential hypertension I10    Paroxysmal atrial fibrillation ([facility]) I48.0    Trigger finger, right middle finger M65.331    Osteoarthritis of finger of right hand M19.041    History of total left hip replacement Z96.642    History of pressure ulcer Z87.2       Past Medical History:        Diagnosis Date    Atrial fibrillation ([facility]     Diabetes mellitus ([facility]     Hyperlipidemia     Hypertension     Olecranon bursitis, left elbow 4/13/2017    FADI (obstructive sleep apnea)     CAN'T TOLERATE DEVICE       Past Surgical History:        Procedure Laterality Date    COLONOSCOPY      EYE SURGERY Bilateral     cataracts    FINGER TRIGGER RELEASE Right 10/04/2017    long    HYSTERECTOMY      JOINT REPLACEMENT Bilateral     bilateral knee replacements    OTHER SURGICAL HISTORY  11/12/2014    phacoemulsification of cataract with intraocular lens implant right eye    ROTATOR CUFF REPAIR Bilateral     bilateral rotator cuff repair    TONSILLECTOMY      TOTAL HIP ARTHROPLASTY Left 9/24/2020    LEFT TOTAL HIP REPLACEMENT WITH Fredick Shown & NEPHEW performed by Arley Lyons MD at [address] [place] [address] History:    Social History     Tobacco Use    Smoking status: Never Smoker    Smokeless tobacco: Never Used   Substance Use Topics    Alcohol use: No     Alcohol/week: 0.0 standard drinks                                Counseling given: Not Answered      Vital Signs (Current):   Vitals:    09/07/21 1541 09/10/21 0657   BP:  137/68   Pulse:  73   Resp:  14   Temp:  97.2 °F (36.2 °C)   TempSrc:  Infrared   SpO2:  96%   Weight: 226 lb (102.5 kg)    Height: 5' 1\" (1.549 m)                                               BP Readings from Last 3 Encounters:   09/10/21 137/68   08/23/21 122/80   08/20/21 (!) 129/113       NPO Status: Time of last liquid consumption: 2100                        Time of last solid consumption: 1900                        Date of last liquid consumption: 09/09/21                        Date of last solid food consumption: 09/09/21    BMI:   Wt Readings from Last 3 Encounters:   09/07/21 226 lb (102.5 kg)   08/23/21 234 lb (106.1 kg)   08/20/21 234 lb (106.1 kg)     Body mass index is 42.7 kg/m².     CBC:   Lab Results   Component Value Date    WBC 10.8 08/20/2021    RBC 4.74 08/20/2021    HGB 12.9 08/20/2021    HCT 38.2 08/20/2021    MCV 80.6 08/20/2021    RDW 14.1 08/20/2021     08/20/2021       CMP:   Lab Results   Component Value Date     08/20/2021    K 3.5 08/20/2021    K 4.6 09/25/2020    CL 97 08/20/2021    CO2 24 08/20/2021    BUN 16 08/20/2021    CREATININE 0.7 08/20/2021    GFRAA >60 08/20/2021    AGRATIO 1.2 08/20/2021    LABGLOM >60 08/20/2021    GLUCOSE 97 08/20/2021    PROT 7.4 08/20/2021    CALCIUM 9.4 08/20/2021    BILITOT 1.5 08/20/2021    ALKPHOS 64 08/20/2021    AST 15 08/20/2021    ALT 12 08/20/2021       POC Tests:   Recent Labs     09/10/21  0708   POCGLU 137*       Coags:   Lab Results   Component Value Date    PROTIME 14.4 09/10/2021    INR 1.26 09/10/2021    APTT 35.6 09/10/2021       HCG (If Applicable): No results found for: PREGTESTUR, PREGSERUM, HCG, HCGQUANT     ABGs: No results found for: PHART, PO2ART, CIG5GTV, PUF8TLH, BEART, N8SUQSPJ     Type & Screen (If Applicable):  No results found for: LABABO, LABRH    Drug/Infectious Status (If Applicable):  No results found for: HIV, HEPCAB    COVID-19 Screening (If Applicable):   Lab Results   Component Value Date    COVID19 Not Detected 09/06/2021           Anesthesia Evaluation     history of anesthetic complications: PONV.  Airway: Mallampati: III  TM distance: <3 FB   Neck ROM: limited  Mouth opening: > = 3 FB Dental: normal exam         Pulmonary:   (+) sleep apnea: on CPAP and noncompliant,                             Cardiovascular:    (+) hypertension:, dysrhythmias: atrial fibrillation,                   Neuro/Psych:   Negative Neuro/Psych ROS              GI/Hepatic/Renal:   (+) morbid obesity         ROS comment: cholecystitis. Endo/Other:    (+) DiabetesType II DM, , : arthritis: OA., .                 Abdominal:             Vascular: negative vascular ROS. Other Findings:             Anesthesia Plan      general     ASA 3     (Pt agrees to risks, benefits and alternatives of GETA. Questions answered. Willing to proceed with plan.)  Induction: intravenous. Anesthetic plan and risks discussed with patient and spouse.                       Nikki Carreon MD   9/10/2021

## 2021-09-10 NOTE — ANESTHESIA POSTPROCEDURE EVALUATION
Department of Anesthesiology  Postprocedure Note    Patient: Bryanna Parson  MRN: [id number]  YOB: 1950  Date of evaluation: 9/10/2021  Time:  4:04 PM     Procedure Summary     Date: 09/10/21 Room / Location: [address] [place] [address]    Anesthesia Start: 3282 Anesthesia Stop: 1688    Procedure: ROBOTIC CHOLECYSTECTOMY (N/A Abdomen) Diagnosis: (GALLSTONES)    Surgeons: Marbin Terrazas MD Responsible Provider: Adelfo Watson MD    Anesthesia Type: general ASA Status: 3          Anesthesia Type: general    Charleen Phase I: Charleen Score: 10    Charleen Phase II: Charleen Score: 10    Last vitals: Reviewed and per EMR flowsheets. Anesthesia Post Evaluation    Patient location during evaluation: PACU  Patient participation: complete - patient participated  Level of consciousness: awake and alert  Airway patency: patent  Nausea & Vomiting: no nausea and no vomiting  Complications: no  Cardiovascular status: blood pressure returned to baseline  Respiratory status: acceptable  Hydration status: euvolemic  Comments: VSS on transfer to phase 2 recovery. No anesthetic complications.

## 2021-09-10 NOTE — PROGRESS NOTES
.Patient pre-op admission complete see flow sheet. IV started. No orders with chart or in Epic will check with MD. Safety checks complete. Call light and family at bedside.

## 2021-09-10 NOTE — PROGRESS NOTES
Received report from [place], CRNA and [place], [facility]. VSS with Sp02 98% on 2 L NC. Eyes open but very lethargic. Trocar sites x 3 drsgs c,d, &I. Will continue to monitor.

## 2021-09-10 NOTE — BRIEF OP NOTE
Brief Postoperative Note      Patient: Maggy Carter  YOB: 1950  MRN: [id number]    Date of Procedure: 9/10/2021    Pre-Op Diagnosis: GALLSTONES    Post-Op Diagnosis: CALCULOUS CHOLECYSTITIS       Procedure(s):  ROBOTIC CHOLECYSTECTOMY    Surgeon(s):  Alexandre Mckinnon MD    Assistant:  Surgical Assistant: Suhas Viveros    Anesthesia: General    Estimated Blood Loss (mL): less than 50     Complications: None    Specimens:   ID Type Source Tests Collected by Time Destination   A : GALLBLADDER AND CONTENTS Tissue Gallbladder SURGICAL PATHOLOGY Alexandre Mckinnon MD 9/10/2021 4002        Implants:  * No implants in log *      Drains: * No LDAs found *    Findings: as above    Electronically signed by Amanda Carreno MD on 9/10/2021 at 9:06 AM

## 2021-09-10 NOTE — OP NOTE
[address]                 [address] [address]                                OPERATIVE REPORT    PATIENT NAME: Monae Ellis                     :        1950  MED REC NO:   [id number]                          ROOM:  ACCOUNT NO:   [de-identified]                           ADMIT DATE: 09/10/2021  PROVIDER:     Candance Noa. Shiff, MD    DATE OF PROCEDURE:  09/10/2021    PREOPERATIVE DIAGNOSIS:  Gallstones. POSTOPERATIVE DIAGNOSIS:  Calculous cholecystitis. PROCEDURE:  Robotic cholecystectomy. SURGEON:  Larisa Hanson MD    ANESTHESIA:  General.    ESTIMATED BLOOD LOSS:  Less than 50 mL. INDICATIONS:  The patient is a 77-year-old woman who presented with  abdominal pain. CAT scan showed some inflammation around the hepatic  flexure, but no gallbladder issue. Ultrasound subsequently showed  gallstones. OPERATIVE SUMMARY:  After preoperative evaluation, the patient was  brought in the operating suite and placed in a comfortable supine  position on the operating room table. Monitoring equipment was attached  and general anesthesia was induced. Her abdomen was sterilely prepped  and draped and a small incision was made at the superior aspect of the  umbilicus. This was dissected down to the fascia, and a suture of 0  Ethibond was placed on either side of the midline. The midline fascia  was opened, and the peritoneal cavity was entered directly. A  figure-of-eight suture was placed across the defect for later closure. A Unique trocar was inserted, and the abdomen was insufflated to a  pressure of 15 mmHg. The remaining ports were placed under direct  internal visualization. She was placed in reverse Trendelenburg and  rotated to the left and the robot was docked. The gallbladder was  grasped and elevated cephalad over the liver edge. There were some  adhesions up to the gallbladder that were taken down bluntly. The  infundibular structures were then dissected out, and the cystic duct and  artery were identified. Anatomic identification was facilitated with  the use of Firefly imaging. The cystic duct was triply clipped and  divided. The cystic artery was likewise clipped and divided. The  gallbladder was then dissected free of liver bed using electrocautery  and set aside. The gallbladder fossa was examined. A few small  bleeding areas were cauterized. Following this, the right upper  quadrant was irrigated and fluid was evacuated. There was no evidence  of further bleeding. There was no bile leakage either by direct  visualization or Firefly imaging. The clips were intact in the proximal  structures. The robot was therefore undocked and the gallbladder was  placed in an Endo retrieval pouch. The trocars were removed and the  gallbladder was withdrawn through the umbilical trocar site in the  pouch. The umbilical fascial defect was closed with the previously  placed figure-of-eight suture of 0 Ethibond. Wounds were injected with  Marcaine, and the skin incisions were closed with subcuticular sutures  of 4-0 Vicryl followed by Benzoin, Steri-Strips, and dry sterile  dressings. All sponge, needle, and instrument counts were correct at  the end of the case. The patient tolerated the procedure well. She was  taken to recovery area in stable condition.         Padmaja Cruz MD    D: 09/10/2021 9:24:32       T: 09/10/2021 9:27:44     [email or web address]  Job#: [id number]     Doc#: [id number]    CC:

## 2021-09-10 NOTE — H&P
I have reviewed the progress note serving as history and physical dated August/23/ 2021 and examined the patient and find no relevant changes. I have reviewed with the patient and/or family the risks, benefits, and alternatives to the procedure.

## 2021-09-10 NOTE — PROGRESS NOTES
Discharge instructions reviewed with patient and family member. Patient and family verbalized understanding. All home medications have been reviewed, questions answered and patient voiced understanding. Given prescriptions, discharge instructions, and appointment time.

## 2021-09-20 ENCOUNTER — OFFICE VISIT (OUTPATIENT)
Dept: SURGERY | Age: 71
End: 2021-09-20

## 2021-09-20 VITALS
WEIGHT: 229 LBS | BODY MASS INDEX: 43.23 KG/M2 | SYSTOLIC BLOOD PRESSURE: 126 MMHG | DIASTOLIC BLOOD PRESSURE: 78 MMHG | HEIGHT: 61 IN

## 2021-09-20 DIAGNOSIS — Z09 POSTOP CHECK: Primary | ICD-10-CM

## 2021-09-20 PROCEDURE — 99024 POSTOP FOLLOW-UP VISIT: CPT | Performed by: SURGERY

## 2021-09-20 NOTE — PROGRESS NOTES
[facility] GENERAL SURGERY      S:   Patient presents s/p robotic cholecystectomy. She reports doing well. O:   Comfortable         Incision sites healing well. A:   S/P robotic cholecystectomy    P:   Follow up as needed.

## 2022-05-13 ENCOUNTER — HOSPITAL ENCOUNTER (OUTPATIENT)
Dept: GENERAL RADIOLOGY | Age: 72
Discharge: HOME | End: 2022-05-13
Payer: MEDICARE

## 2022-05-13 DIAGNOSIS — M54.16 LUMBAR RADICULOPATHY: ICD-10-CM

## 2022-05-13 DIAGNOSIS — S32.050A CLOSED WEDGE COMPRESSION FRACTURE OF L5 VERTEBRA, INITIAL ENCOUNTER (HCC): ICD-10-CM

## 2022-05-13 DIAGNOSIS — M43.16 SPONDYLOLISTHESIS OF LUMBAR REGION: ICD-10-CM

## 2022-05-13 DIAGNOSIS — M54.50 CHRONIC LOW BACK PAIN, UNSPECIFIED BACK PAIN LATERALITY, UNSPECIFIED WHETHER SCIATICA PRESENT: ICD-10-CM

## 2022-05-13 DIAGNOSIS — G89.29 CHRONIC LOW BACK PAIN, UNSPECIFIED BACK PAIN LATERALITY, UNSPECIFIED WHETHER SCIATICA PRESENT: ICD-10-CM

## 2022-05-13 PROCEDURE — 77080 DXA BONE DENSITY AXIAL: CPT

## 2022-07-07 ENCOUNTER — TELEPHONE (OUTPATIENT)
Dept: MAMMOGRAPHY | Age: 72
End: 2022-07-07

## 2022-07-07 ENCOUNTER — HOSPITAL ENCOUNTER (OUTPATIENT)
Dept: MAMMOGRAPHY | Age: 72
Discharge: HOME | End: 2022-07-07
Payer: MEDICARE

## 2022-07-07 DIAGNOSIS — Z12.31 SCREENING MAMMOGRAM, ENCOUNTER FOR: ICD-10-CM

## 2022-07-07 PROCEDURE — 77067 SCR MAMMO BI INCL CAD: CPT

## 2022-11-22 ENCOUNTER — HOSPITAL ENCOUNTER (EMERGENCY)
Age: 72
Discharge: HOME | End: 2022-11-22
Payer: MEDICARE

## 2022-11-22 ENCOUNTER — APPOINTMENT (OUTPATIENT)
Dept: CT IMAGING | Age: 72
End: 2022-11-22
Payer: MEDICARE

## 2022-11-22 VITALS
OXYGEN SATURATION: 98 % | DIASTOLIC BLOOD PRESSURE: 64 MMHG | HEIGHT: 61 IN | WEIGHT: 228 LBS | SYSTOLIC BLOOD PRESSURE: 148 MMHG | HEART RATE: 88 BPM | TEMPERATURE: 98.7 F | BODY MASS INDEX: 43.05 KG/M2 | RESPIRATION RATE: 16 BRPM

## 2022-11-22 DIAGNOSIS — Z79.01 ON ANTICOAGULANT THERAPY: ICD-10-CM
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
## 2022-11-22 DIAGNOSIS — S09.90XA INJURY OF HEAD, INITIAL ENCOUNTER: ICD-10-CM

## 2022-11-22 LAB
A/G RATIO: 1.5 (ref 1.1–2.2)
ALBUMIN SERPL-MCNC: 4 G/DL (ref 3.4–5)
ALP BLD-CCNC: 75 U/L (ref 40–129)
ALT SERPL-CCNC: 21 U/L (ref 10–40)
ANION GAP SERPL CALCULATED.3IONS-SCNC: 15 MMOL/L (ref 3–16)
AST SERPL-CCNC: 20 U/L (ref 15–37)
BASOPHILS ABSOLUTE: 0.1 K/UL (ref 0–0.2)
BASOPHILS RELATIVE PERCENT: 0.8 %
BILIRUB SERPL-MCNC: 0.9 MG/DL (ref 0–1)
BUN BLDV-MCNC: 19 MG/DL (ref 7–20)
CALCIUM SERPL-MCNC: 9.6 MG/DL (ref 8.3–10.6)
CHLORIDE BLD-SCNC: 95 MMOL/L (ref 99–110)
CO2: 25 MMOL/L (ref 21–32)
CREAT SERPL-MCNC: 0.7 MG/DL (ref 0.6–1.2)
EOSINOPHILS ABSOLUTE: 0.1 K/UL (ref 0–0.6)
EOSINOPHILS RELATIVE PERCENT: 0.7 %
GFR SERPL CREATININE-BSD FRML MDRD: >60 ML/MIN/{1.73_M2}
GLUCOSE BLD-MCNC: 170 MG/DL (ref 70–99)
HCT VFR BLD CALC: 42.8 % (ref 36–48)
HEMOGLOBIN: 14.2 G/DL (ref 12–16)
INR BLD: 2.44 (ref 0.87–1.14)
LYMPHOCYTES ABSOLUTE: 1.4 K/UL (ref 1–5.1)
LYMPHOCYTES RELATIVE PERCENT: 14.6 %
MCH RBC QN AUTO: 27.6 PG (ref 26–34)
MCHC RBC AUTO-ENTMCNC: 33.2 G/DL (ref 31–36)
MCV RBC AUTO: 83.3 FL (ref 80–100)
MONOCYTES ABSOLUTE: 0.6 K/UL (ref 0–1.3)
MONOCYTES RELATIVE PERCENT: 6.6 %
NEUTROPHILS ABSOLUTE: 7.3 K/UL (ref 1.7–7.7)
NEUTROPHILS RELATIVE PERCENT: 77.3 %
PDW BLD-RTO: 14.5 % (ref 12.4–15.4)
PLATELET # BLD: 218 K/UL (ref 135–450)
PMV BLD AUTO: 7.3 FL (ref 5–10.5)
POTASSIUM REFLEX MAGNESIUM: 4 MMOL/L (ref 3.5–5.1)
PROTHROMBIN TIME: 26.6 SEC (ref 11.7–14.5)
RBC # BLD: 5.14 M/UL (ref 4–5.2)
SODIUM BLD-SCNC: 135 MMOL/L (ref 136–145)
TOTAL PROTEIN: 6.6 G/DL (ref 6.4–8.2)
WBC # BLD: 9.5 K/UL (ref 4–11)

## 2022-11-22 PROCEDURE — 85610 PROTHROMBIN TIME: CPT

## 2022-11-22 PROCEDURE — 99284 EMERGENCY DEPT VISIT MOD MDM: CPT

## 2022-11-22 PROCEDURE — 72125 CT NECK SPINE W/O DYE: CPT

## 2022-11-22 PROCEDURE — 70450 CT HEAD/BRAIN W/O DYE: CPT

## 2022-11-22 PROCEDURE — 80053 COMPREHEN METABOLIC PANEL: CPT

## 2022-11-22 PROCEDURE — 85025 COMPLETE CBC W/AUTO DIFF WBC: CPT

## 2022-11-22 PROCEDURE — 71250 CT THORAX DX C-: CPT

## 2022-11-22 RX ORDER — LANOLIN ALCOHOL/MO/W.PET/CERES
1000 CREAM (GRAM) TOPICAL DAILY
COMMUNITY

## 2022-11-22 RX ORDER — TRIMETHOPRIM 100 MG/1
100 TABLET ORAL 2 TIMES DAILY
COMMUNITY

## 2022-11-22 ASSESSMENT — ENCOUNTER SYMPTOMS
CHEST TIGHTNESS: 0
COUGH: 0
SHORTNESS OF BREATH: 0
BACK PAIN: 0
VOMITING: 0
DIARRHEA: 0
ABDOMINAL PAIN: 0
SORE THROAT: 0
NAUSEA: 0

## 2022-11-22 ASSESSMENT — PAIN SCALES - GENERAL
PAINLEVEL_OUTOF10: 7
PAINLEVEL_OUTOF10: 7

## 2022-11-22 ASSESSMENT — PAIN - FUNCTIONAL ASSESSMENT
PAIN_FUNCTIONAL_ASSESSMENT: 0-10
PAIN_FUNCTIONAL_ASSESSMENT: 0-10

## 2022-11-22 NOTE — ED PROVIDER NOTES
**ADVANCED PRACTICE PROVIDER, I HAVE EVALUATED THIS [facility]  ED  EMERGENCY DEPARTMENT ENCOUNTER      Pt Name: Ruchi HOY:[id number]  [place] 1950  Date of evaluation: 11/22/2022  Provider: NOBLE Vann  Note Started: 12:48 PM EST 11/22/2022        Chief Complaint:    Chief Complaint   Patient presents with    Fall     Per pt carrying object today slipped fell struck front of head on bench/no LOC/I feel nauseated/I have left side back pain too         Nursing Notes, Past Medical Hx, Past Surgical Hx, Social Hx, Allergies, and Family Hx were all reviewed and agreed with or any disagreements were addressed in the HPI.    HPI: (Location, Duration, Timing, Severity, Quality, Assoc Sx, Context, Modifying factors)    Chief Complaint of head injury after a fall. This is a  74 y.o. female who presents via private vehicle after a fall. Patient has past medical history of diabetes, hypertension, hyperlipidemia, atrial fibrillation on Coumadin. She states she was getting her [employer] decorations out when she tripped walking into the house. She hit her head on a bench in her entry way. She denies any loss of consciousness. She had her INR checked yesterday and it was 2.4. She does have a very mild headache. She has mild bruising to her left hand, left arm and right arm from the fall. She is also complaining of back pain. She states it hurts to take a deep breath. The pain is primarily lower ribs in the back. She denies any abdominal pain. Currently rates pain as 7/10.   Denies any chest pain or cough    PastMedical/Surgical History:      Diagnosis Date    Atrial fibrillation (HCC)     Diabetes mellitus ([facility] [address]     Hyperlipidemia     Hypertension     Olecranon bursitis, left elbow 4/13/2017    FADI (obstructive sleep apnea)     CAN'T TOLERATE DEVICE         Procedure Laterality Date    CHOLECYSTECTOMY, LAPAROSCOPIC N/A 9/10/2021    ROBOTIC CHOLECYSTECTOMY performed by Nemesio Buck MD at [address] Bilateral     cataracts    [address] Right 10/04/2017    long    HYSTERECTOMY (CERVIX STATUS UNKNOWN)      JOINT REPLACEMENT Bilateral     bilateral knee replacements    OTHER SURGICAL HISTORY  11/12/2014    phacoemulsification of cataract with intraocular lens implant right eye    OTHER SURGICAL HISTORY  09/10/2021    ROBOTIC CHOLECYSTECTOMY    OVARY REMOVAL      ROTATOR CUFF REPAIR Bilateral     bilateral rotator cuff repair    TONSILLECTOMY      TOTAL HIP ARTHROPLASTY Left 9/24/2020    LEFT TOTAL HIP REPLACEMENT WITH Brandy Night & NEPHEW performed by Claudene Huge, MD at [address]         Medications:  Discharge Medication List as of 11/22/2022  2:19 PM        CONTINUE these medications which have NOT CHANGED    Details   trimethoprim (TRIMPEX) 100 MG tablet Take 100 mg by mouth 2 times dailyHistorical Med      vitamin B-12 (CYANOCOBALAMIN) 1000 MCG tablet Take 1,000 mcg by mouth dailyHistorical Med      ondansetron (ZOFRAN) 4 MG tablet Take 1 tablet by mouth 3 times daily as needed for Nausea or Vomiting, Disp-15 tablet, R-0Normal      Probiotic Product (PROBIOTIC-10 PO) Take by mouthHistorical Med      glipiZIDE (GLUCOTROL XL) 5 MG extended release tablet Take 5 mg by mouth dailyHistorical Med      Cholecalciferol (VITAMIN D-3) 25 MCG (1000 UT) CAPS Take by mouth dailyHistorical Med      hydrochlorothiazide (HYDRODIURIL) 25 MG tablet Take 1 tablet by mouth daily, Disp-90 tablet, R-3Adjust Sig      atorvastatin (LIPITOR) 40 MG tablet Take 40 mg by mouth nightly Historical Med      warfarin (COUMADIN) 2 MG tablet Take 2 tablets by mouth daily, Disp-60 tablet, R-0      metFORMIN (GLUCOPHAGE) 500 MG tablet Take 1,000 mg by mouth 2 times daily (with meals)       losartan (COZAAR) 100 MG tablet Take 100 mg by mouth daily.                Review of Systems:  (2-9 systems needed)  Review of Systems   Constitutional: Negative for chills and fever. HENT:  Negative for congestion, nosebleeds and sore throat. Eyes:  Negative for visual disturbance. Respiratory:  Negative for cough, chest tightness and shortness of breath. Cardiovascular:  Negative for chest pain and palpitations. Gastrointestinal:  Negative for abdominal pain, diarrhea, nausea and vomiting. Genitourinary:  Negative for dysuria, frequency, hematuria and urgency. Musculoskeletal:  Negative for back pain and neck pain. Skin:  Negative for rash. Neurological:  Positive for headaches. Negative for dizziness, weakness and light-headedness. \"Positives and Pertinent negatives as per HPI\"    Physical Exam:  Physical Exam  Vitals and nursing note reviewed. Constitutional:       Appearance: Normal appearance. She is not diaphoretic. HENT:      Head: Normocephalic and atraumatic. Nose: Nose normal.      Mouth/Throat:      Mouth: Mucous membranes are moist.   Eyes:      General:         Right eye: No discharge. Left eye: No discharge. Extraocular Movements: Extraocular movements intact. Pupils: Pupils are equal, round, and reactive to light. Cardiovascular:      Rate and Rhythm: Normal rate and regular rhythm. Pulses: Normal pulses. Heart sounds: Normal heart sounds. No murmur heard. No friction rub. No gallop. Pulmonary:      Effort: Pulmonary effort is normal. No respiratory distress. Breath sounds: Normal breath sounds. No stridor. No wheezing, rhonchi or rales. Abdominal:      General: Abdomen is flat. Palpations: Abdomen is soft. Tenderness: There is no abdominal tenderness. There is no guarding or rebound. Musculoskeletal:         General: Normal range of motion. Cervical back: Normal range of motion and neck supple. Skin:     General: Skin is warm and dry. Coloration: Skin is not pale. Neurological:      General: No focal deficit present.       Mental Status: She is alert and oriented to person, place, and time. Psychiatric:         Mood and Affect: Mood normal.         Behavior: Behavior normal.       MEDICAL DECISION MAKING    Vitals:    Vitals:    11/22/22 1214 11/22/22 1351   BP: (!) 173/97 (!) 148/64   Pulse: 94 88   Resp: 16 16   Temp: 98.7 °F (37.1 °C)    TempSrc: Oral    SpO2: 98% 98%   Weight: 228 lb (103.4 kg)    Height: 5' 1\" (1.549 m)        LABS:  Labs Reviewed   PROTIME-INR - Abnormal; Notable for the following components:       Result Value    Protime 26.6 (*)     INR 2.44 (*)     All other components within normal limits   COMPREHENSIVE METABOLIC PANEL W/ REFLEX TO MG FOR LOW K - Abnormal; Notable for the following components:    Sodium 135 (*)     Chloride 95 (*)     Glucose 170 (*)     All other components within normal limits   CBC WITH AUTO DIFFERENTIAL        Remainder of labs reviewed and were negative at this time or not returned at the time of this note. RADIOLOGY:   Non-plain film images such as CT, Ultrasound and MRI are read by the radiologist. Merton Litten, PA have directly visualized the radiologic plain film image(s) with the below findings:      Interpretation per the Radiologist below, if available at the time of this note:    CT HEAD WO CONTRAST   Preliminary Result   No evidence of acute intracranial abnormality. CT CERVICAL SPINE WO CONTRAST   Final Result   No acute cervical spine fracture. Spinal degenerative changes as described with at least moderate central canal   narrowing from C3-C4 through C6-C7. Kyphosis of the spine which could be related to patient positioning,   degenerative changes or muscle spasm. CT CHEST WO CONTRAST   Final Result   Negative noncontrast CT of the chest.                  MEDICAL DECISION MAKING / ED COURSE:      PROCEDURES:   Procedures    None    Patient was given:  Medications - No data to display    Patient presents to the emergency department today after a mechanical fall. Patient is well-appearing on exam.  Vital signs are stable. The patient does not require anything for pain or nausea while in the emergency department. Work-up results include:  CBC without evidence of leukocytosis or acute anemia  CMP without acute electrolyte abnormality maintain renal function. INR is 2.44    CT head shows no evidence of acute intracranial abnormality. CT cervical spine without acute cervical fracture. She is noted to have degenerative changes and kyphosis. CT chest without evidence of rib fracture or acute abnormality. A discussion was had with the patient regarding all lab and imaging results. Her work-up is overall very reassuring. Given very reassuring work-up I do feel the patient is appropriate for discharge home. The patient is comfortable with this plan. We discussed return precautions. She will follow-up with her primary care physician in the next 72 hours for reevaluation. The patient tolerated their visit well. I evaluated the patient. The physician was available for consultation as needed. The patient and / or the family were informed of the results of any tests, a time was given to answer questions, a plan was proposed and they agreed with plan. I am the Primary Clinician of Record. CLINICAL IMPRESSION:  1. Fall, initial encounter    2. Injury of head, initial encounter    3.  On anticoagulant therapy      Results for orders placed or performed during the hospital encounter of 11/22/22   Protime-INR   Result Value Ref Range    Protime 26.6 (H) 11.7 - 14.5 sec    INR 2.44 (H) 0.87 - 1.14   CBC with Auto Differential   Result Value Ref Range    WBC 9.5 4.0 - 11.0 K/uL    RBC 5.14 4.00 - 5.20 M/uL    Hemoglobin 14.2 12.0 - 16.0 g/dL    Hematocrit 42.8 36.0 - 48.0 %    MCV 83.3 80.0 - 100.0 fL    MCH 27.6 26.0 - 34.0 pg    MCHC 33.2 31.0 - 36.0 g/dL    RDW 14.5 12.4 - 15.4 %    Platelets 694 076 - 157 K/uL    MPV 7.3 5.0 - 10.5 fL    Neutrophils % 77.3 % Lymphocytes % 14.6 %    Monocytes % 6.6 %    Eosinophils % 0.7 %    Basophils % 0.8 %    Neutrophils Absolute 7.3 1.7 - 7.7 K/uL    Lymphocytes Absolute 1.4 1.0 - 5.1 K/uL    Monocytes Absolute 0.6 0.0 - 1.3 K/uL    Eosinophils Absolute 0.1 0.0 - 0.6 K/uL    Basophils Absolute 0.1 0.0 - 0.2 K/uL   CMP w/ Reflex to MG   Result Value Ref Range    Sodium 135 (L) 136 - 145 mmol/L    Potassium reflex Magnesium 4.0 3.5 - 5.1 mmol/L    Chloride 95 (L) 99 - 110 mmol/L    CO2 25 21 - 32 mmol/L    Anion Gap 15 3 - 16    Glucose 170 (H) 70 - 99 mg/dL    BUN 19 7 - 20 mg/dL    Creatinine 0.7 0.6 - 1.2 mg/dL    Est, Glom Filt Rate >60 >60    Calcium 9.6 8.3 - 10.6 mg/dL    Total Protein 6.6 6.4 - 8.2 g/dL    Albumin 4.0 3.4 - 5.0 g/dL    Albumin/Globulin Ratio 1.5 1.1 - 2.2    Total Bilirubin 0.9 0.0 - 1.0 mg/dL    Alkaline Phosphatase 75 40 - 129 U/L    ALT 21 10 - 40 U/L    AST 20 15 - 37 U/L       I estimate there is LOW risk for SUBARACHNOID HEMORRHAGE, MENINGITIS, INTRACRANIAL HEMORRHAGE, SUBDURAL HEMATOMA, OR STROKE, thus I consider the discharge disposition reasonable. Prudence Mood and I have discussed the diagnosis and risks, and we agree with discharging home to follow-up with their primary doctor. We also discussed returning to the Emergency Department immediately if new or worsening symptoms occur. We have discussed the symptoms which are most concerning (e.g., changing or worsening pain, weakness, vomiting, fever) that necessitate immediate return. Final Impression    1. Fall, initial encounter    2. Injury of head, initial encounter    3. On anticoagulant therapy        Discharge Vital Signs:  Blood pressure (!) 148/64, pulse 88, temperature 98.7 °F (37.1 °C), temperature source Oral, resp. rate 16, height 5' 1\" (1.549 m), weight 228 lb (103.4 kg), SpO2 98 %, not currently breastfeeding.       DISPOSITION Decision To Discharge 11/22/2022 02:02:49 PM      PATIENT REFERRED TO:  [facility]  [phone] [address] [place], [place] [id number] [phone]  Go to   If symptoms worsen    Mary Alice Mahmood, [address] [id number] [address]          DISCHARGE MEDICATIONS:  Discharge Medication List as of 11/22/2022  2:19 PM          DISCONTINUED MEDICATIONS:  Discharge Medication List as of 11/22/2022  2:19 PM                 (Please note the MDM and HPI sections of this note were completed with a voice recognition program.  Efforts were made to edit the dictations but occasionally words are mis-transcribed.)    Electronically signed, Dedrick Millard, [id number],          Dedrick Millard, [id number]  11/22/22 0970

## 2022-11-22 NOTE — ED NOTES
Pt instructed to follow up with PCP. Assessed per Anastacio DICKEY.      All Gerber, COLIN  97/51/59 2025

## 2022-11-26 ENCOUNTER — APPOINTMENT (OUTPATIENT)
Dept: GENERAL RADIOLOGY | Age: 72
DRG: 194 | End: 2022-11-26
Payer: MEDICARE

## 2022-11-26 ENCOUNTER — HOSPITAL ENCOUNTER (INPATIENT)
Age: 72
LOS: 2 days | Discharge: HOME HEALTH | DRG: 194 | End: 2022-11-29
Attending: EMERGENCY MEDICINE | Admitting: INTERNAL MEDICINE
Payer: MEDICARE

## 2022-11-26 DIAGNOSIS — J10.1 INFLUENZA A: Primary | ICD-10-CM

## 2022-11-26 DIAGNOSIS — E83.42 HYPOMAGNESEMIA: ICD-10-CM

## 2022-11-26 DIAGNOSIS — E87.6 HYPOKALEMIA: ICD-10-CM

## 2022-11-26 DIAGNOSIS — R53.1 GENERALIZED WEAKNESS: ICD-10-CM

## 2022-11-26 DIAGNOSIS — R94.31 ABNORMAL EKG: ICD-10-CM

## 2022-11-26 DIAGNOSIS — E86.0 DEHYDRATION: ICD-10-CM

## 2022-11-26 LAB
BASE EXCESS VENOUS: 1.3 MMOL/L (ref -3–3)
BASOPHILS ABSOLUTE: 0 K/UL (ref 0–0.2)
BASOPHILS RELATIVE PERCENT: 0.5 %
BILIRUBIN URINE: NEGATIVE
BLOOD, URINE: NEGATIVE
CARBOXYHEMOGLOBIN: 1.6 % (ref 0–1.5)
CLARITY: CLEAR
COLOR: YELLOW
EOSINOPHILS ABSOLUTE: 0 K/UL (ref 0–0.6)
EOSINOPHILS RELATIVE PERCENT: 0.5 %
GLUCOSE URINE: 500 MG/DL
HCO3 VENOUS: 24.4 MMOL/L (ref 23–29)
HCT VFR BLD CALC: 39.7 % (ref 36–48)
HEMOGLOBIN: 13.3 G/DL (ref 12–16)
INR BLD: 2.49 (ref 0.87–1.14)
KETONES, URINE: NEGATIVE MG/DL
LACTIC ACID: 3.7 MMOL/L (ref 0.4–2)
LEUKOCYTE ESTERASE, URINE: NEGATIVE
LYMPHOCYTES ABSOLUTE: 0.5 K/UL (ref 1–5.1)
LYMPHOCYTES RELATIVE PERCENT: 6.3 %
MCH RBC QN AUTO: 27.5 PG (ref 26–34)
MCHC RBC AUTO-ENTMCNC: 33.5 G/DL (ref 31–36)
MCV RBC AUTO: 82 FL (ref 80–100)
METHEMOGLOBIN VENOUS: 0.6 %
MICROSCOPIC EXAMINATION: ABNORMAL
MONOCYTES ABSOLUTE: 0.8 K/UL (ref 0–1.3)
MONOCYTES RELATIVE PERCENT: 10.6 %
NEUTROPHILS ABSOLUTE: 6.2 K/UL (ref 1.7–7.7)
NEUTROPHILS RELATIVE PERCENT: 82.1 %
NITRITE, URINE: NEGATIVE
O2 SAT, VEN: 98 %
O2 THERAPY: ABNORMAL
PCO2, VEN: 34.1 MMHG (ref 40–50)
PDW BLD-RTO: 14.6 % (ref 12.4–15.4)
PH UA: 6 (ref 5–8)
PH VENOUS: 7.47 (ref 7.35–7.45)
PLATELET # BLD: 200 K/UL (ref 135–450)
PMV BLD AUTO: 7.5 FL (ref 5–10.5)
PO2, VEN: 144.5 MMHG (ref 25–40)
PROTEIN UA: NEGATIVE MG/DL
PROTHROMBIN TIME: 27 SEC (ref 11.7–14.5)
RBC # BLD: 4.84 M/UL (ref 4–5.2)
SPECIFIC GRAVITY UA: 1.02 (ref 1–1.03)
TCO2 CALC VENOUS: 25 MMOL/L
URINE REFLEX TO CULTURE: ABNORMAL
URINE TYPE: ABNORMAL
UROBILINOGEN, URINE: 0.2 E.U./DL
WBC # BLD: 7.6 K/UL (ref 4–11)

## 2022-11-26 PROCEDURE — 93005 ELECTROCARDIOGRAM TRACING: CPT | Performed by: EMERGENCY MEDICINE

## 2022-11-26 PROCEDURE — 85610 PROTHROMBIN TIME: CPT

## 2022-11-26 PROCEDURE — 96375 TX/PRO/DX INJ NEW DRUG ADDON: CPT

## 2022-11-26 PROCEDURE — 86140 C-REACTIVE PROTEIN: CPT

## 2022-11-26 PROCEDURE — 84484 ASSAY OF TROPONIN QUANT: CPT

## 2022-11-26 PROCEDURE — 83690 ASSAY OF LIPASE: CPT

## 2022-11-26 PROCEDURE — 83605 ASSAY OF LACTIC ACID: CPT

## 2022-11-26 PROCEDURE — 2580000003 HC RX 258: Performed by: EMERGENCY MEDICINE

## 2022-11-26 PROCEDURE — 87636 SARSCOV2 & INF A&B AMP PRB: CPT

## 2022-11-26 PROCEDURE — 96361 HYDRATE IV INFUSION ADD-ON: CPT

## 2022-11-26 PROCEDURE — 83735 ASSAY OF MAGNESIUM: CPT

## 2022-11-26 PROCEDURE — 81003 URINALYSIS AUTO W/O SCOPE: CPT

## 2022-11-26 PROCEDURE — 82550 ASSAY OF CK (CPK): CPT

## 2022-11-26 PROCEDURE — 85652 RBC SED RATE AUTOMATED: CPT

## 2022-11-26 PROCEDURE — 6360000002 HC RX W HCPCS: Performed by: EMERGENCY MEDICINE

## 2022-11-26 PROCEDURE — 80053 COMPREHEN METABOLIC PANEL: CPT

## 2022-11-26 PROCEDURE — 85025 COMPLETE CBC W/AUTO DIFF WBC: CPT

## 2022-11-26 PROCEDURE — 82803 BLOOD GASES ANY COMBINATION: CPT

## 2022-11-26 PROCEDURE — 99285 EMERGENCY DEPT VISIT HI MDM: CPT

## 2022-11-26 RX ORDER — ONDANSETRON 2 MG/ML
4 INJECTION INTRAMUSCULAR; INTRAVENOUS ONCE
Status: COMPLETED | OUTPATIENT
Start: 2022-11-26 | End: 2022-11-26

## 2022-11-26 RX ORDER — 0.9 % SODIUM CHLORIDE 0.9 %
1000 INTRAVENOUS SOLUTION INTRAVENOUS ONCE
Status: COMPLETED | OUTPATIENT
Start: 2022-11-26 | End: 2022-11-27

## 2022-11-26 RX ADMIN — SODIUM CHLORIDE 1000 ML: 9 INJECTION, SOLUTION INTRAVENOUS at 23:33

## 2022-11-26 RX ADMIN — ONDANSETRON 4 MG: 2 INJECTION INTRAMUSCULAR; INTRAVENOUS at 23:34

## 2022-11-26 ASSESSMENT — PAIN - FUNCTIONAL ASSESSMENT: PAIN_FUNCTIONAL_ASSESSMENT: NONE - DENIES PAIN

## 2022-11-27 ENCOUNTER — APPOINTMENT (OUTPATIENT)
Dept: GENERAL RADIOLOGY | Age: 72
DRG: 194 | End: 2022-11-27
Payer: MEDICARE

## 2022-11-27 PROBLEM — A41.9 SEVERE SEPSIS (HCC): Status: ACTIVE | Noted: 2022-11-27

## 2022-11-27 PROBLEM — R79.89 ELEVATED LACTIC ACID LEVEL: Status: ACTIVE | Noted: 2022-11-27

## 2022-11-27 PROBLEM — R65.20 SEVERE SEPSIS (HCC): Status: ACTIVE | Noted: 2022-11-27

## 2022-11-27 PROBLEM — J10.1 INFLUENZA A: Status: ACTIVE | Noted: 2022-11-27

## 2022-11-27 PROBLEM — R65.20 SEVERE SEPSIS (HCC): Status: RESOLVED | Noted: 2022-11-27 | Resolved: 2022-11-27

## 2022-11-27 PROBLEM — E83.42 HYPOMAGNESEMIA: Status: ACTIVE | Noted: 2022-11-27

## 2022-11-27 PROBLEM — A41.9 SEVERE SEPSIS (HCC): Status: RESOLVED | Noted: 2022-11-27 | Resolved: 2022-11-27

## 2022-11-27 PROBLEM — E11.65 HYPERGLYCEMIA DUE TO TYPE 2 DIABETES MELLITUS (HCC): Status: ACTIVE | Noted: 2022-11-27

## 2022-11-27 LAB
A/G RATIO: 1.5 (ref 1.1–2.2)
ALBUMIN SERPL-MCNC: 4.1 G/DL (ref 3.4–5)
ALP BLD-CCNC: 67 U/L (ref 40–129)
ALT SERPL-CCNC: 19 U/L (ref 10–40)
ANION GAP SERPL CALCULATED.3IONS-SCNC: 11 MMOL/L (ref 3–16)
ANION GAP SERPL CALCULATED.3IONS-SCNC: 11 MMOL/L (ref 3–16)
ANION GAP SERPL CALCULATED.3IONS-SCNC: 14 MMOL/L (ref 3–16)
AST SERPL-CCNC: 20 U/L (ref 15–37)
BILIRUB SERPL-MCNC: 0.9 MG/DL (ref 0–1)
BUN BLDV-MCNC: 13 MG/DL (ref 7–20)
BUN BLDV-MCNC: 15 MG/DL (ref 7–20)
BUN BLDV-MCNC: 18 MG/DL (ref 7–20)
C-REACTIVE PROTEIN: 43.3 MG/L (ref 0–5.1)
CALCIUM SERPL-MCNC: 8.2 MG/DL (ref 8.3–10.6)
CALCIUM SERPL-MCNC: 9 MG/DL (ref 8.3–10.6)
CALCIUM SERPL-MCNC: 9.2 MG/DL (ref 8.3–10.6)
CHLORIDE BLD-SCNC: 100 MMOL/L (ref 99–110)
CHLORIDE BLD-SCNC: 96 MMOL/L (ref 99–110)
CHLORIDE BLD-SCNC: 97 MMOL/L (ref 99–110)
CO2: 24 MMOL/L (ref 21–32)
CO2: 25 MMOL/L (ref 21–32)
CO2: 27 MMOL/L (ref 21–32)
CREAT SERPL-MCNC: 0.6 MG/DL (ref 0.6–1.2)
CREAT SERPL-MCNC: 0.7 MG/DL (ref 0.6–1.2)
CREAT SERPL-MCNC: 0.8 MG/DL (ref 0.6–1.2)
EKG ATRIAL RATE: 100 BPM
EKG ATRIAL RATE: 103 BPM
EKG ATRIAL RATE: 110 BPM
EKG DIAGNOSIS: NORMAL
EKG P AXIS: 66 DEGREES
EKG P-R INTERVAL: 136 MS
EKG P-R INTERVAL: 148 MS
EKG Q-T INTERVAL: 328 MS
EKG Q-T INTERVAL: 338 MS
EKG Q-T INTERVAL: 406 MS
EKG QRS DURATION: 100 MS
EKG QRS DURATION: 88 MS
EKG QRS DURATION: 92 MS
EKG QTC CALCULATION (BAZETT): 442 MS
EKG QTC CALCULATION (BAZETT): 443 MS
EKG QTC CALCULATION (BAZETT): 526 MS
EKG R AXIS: 30 DEGREES
EKG R AXIS: 39 DEGREES
EKG R AXIS: 47 DEGREES
EKG T AXIS: -57 DEGREES
EKG T AXIS: 41 DEGREES
EKG T AXIS: 44 DEGREES
EKG VENTRICULAR RATE: 101 BPM
EKG VENTRICULAR RATE: 103 BPM
EKG VENTRICULAR RATE: 110 BPM
GFR SERPL CREATININE-BSD FRML MDRD: >60 ML/MIN/{1.73_M2}
GLUCOSE BLD-MCNC: 142 MG/DL (ref 70–99)
GLUCOSE BLD-MCNC: 148 MG/DL (ref 70–99)
GLUCOSE BLD-MCNC: 158 MG/DL (ref 70–99)
GLUCOSE BLD-MCNC: 161 MG/DL (ref 70–99)
GLUCOSE BLD-MCNC: 186 MG/DL (ref 70–99)
GLUCOSE BLD-MCNC: 187 MG/DL (ref 70–99)
GLUCOSE BLD-MCNC: 198 MG/DL (ref 70–99)
GLUCOSE BLD-MCNC: 220 MG/DL (ref 70–99)
INFLUENZA A: DETECTED
INFLUENZA B: NOT DETECTED
LACTIC ACID, SEPSIS: 2.2 MMOL/L (ref 0.4–1.9)
LACTIC ACID: 1.3 MMOL/L (ref 0.4–2)
LACTIC ACID: 3.1 MMOL/L (ref 0.4–2)
LIPASE: 18 U/L (ref 13–60)
MAGNESIUM: 0.8 MG/DL (ref 1.8–2.4)
MAGNESIUM: 1.3 MG/DL (ref 1.8–2.4)
MAGNESIUM: 1.6 MG/DL (ref 1.8–2.4)
PERFORMED ON: ABNORMAL
POTASSIUM REFLEX MAGNESIUM: 3.9 MMOL/L (ref 3.5–5.1)
POTASSIUM REFLEX MAGNESIUM: 4 MMOL/L (ref 3.5–5.1)
POTASSIUM SERPL-SCNC: 3.2 MMOL/L (ref 3.5–5.1)
SARS-COV-2 RNA, RT PCR: NOT DETECTED
SEDIMENTATION RATE, ERYTHROCYTE: 36 MM/HR (ref 0–30)
SODIUM BLD-SCNC: 134 MMOL/L (ref 136–145)
SODIUM BLD-SCNC: 135 MMOL/L (ref 136–145)
SODIUM BLD-SCNC: 136 MMOL/L (ref 136–145)
TOTAL CK: 40 U/L (ref 26–192)
TOTAL PROTEIN: 6.9 G/DL (ref 6.4–8.2)
TROPONIN: <0.01 NG/ML
TROPONIN: <0.01 NG/ML
TSH REFLEX: 1.67 UIU/ML (ref 0.27–4.2)

## 2022-11-27 PROCEDURE — 83605 ASSAY OF LACTIC ACID: CPT

## 2022-11-27 PROCEDURE — 6370000000 HC RX 637 (ALT 250 FOR IP): Performed by: NURSE PRACTITIONER

## 2022-11-27 PROCEDURE — 84484 ASSAY OF TROPONIN QUANT: CPT

## 2022-11-27 PROCEDURE — 83735 ASSAY OF MAGNESIUM: CPT

## 2022-11-27 PROCEDURE — 2060000000 HC ICU INTERMEDIATE R&B

## 2022-11-27 PROCEDURE — 93010 ELECTROCARDIOGRAM REPORT: CPT | Performed by: INTERNAL MEDICINE

## 2022-11-27 PROCEDURE — 71045 X-RAY EXAM CHEST 1 VIEW: CPT

## 2022-11-27 PROCEDURE — 84443 ASSAY THYROID STIM HORMONE: CPT

## 2022-11-27 PROCEDURE — 2580000003 HC RX 258: Performed by: NURSE PRACTITIONER

## 2022-11-27 PROCEDURE — 6360000002 HC RX W HCPCS: Performed by: EMERGENCY MEDICINE

## 2022-11-27 PROCEDURE — 74018 RADEX ABDOMEN 1 VIEW: CPT

## 2022-11-27 PROCEDURE — 6370000000 HC RX 637 (ALT 250 FOR IP): Performed by: EMERGENCY MEDICINE

## 2022-11-27 PROCEDURE — 80048 BASIC METABOLIC PNL TOTAL CA: CPT

## 2022-11-27 PROCEDURE — 93005 ELECTROCARDIOGRAM TRACING: CPT | Performed by: NURSE PRACTITIONER

## 2022-11-27 PROCEDURE — 6360000002 HC RX W HCPCS: Performed by: INTERNAL MEDICINE

## 2022-11-27 PROCEDURE — 83036 HEMOGLOBIN GLYCOSYLATED A1C: CPT

## 2022-11-27 PROCEDURE — 96365 THER/PROPH/DIAG IV INF INIT: CPT

## 2022-11-27 PROCEDURE — 87040 BLOOD CULTURE FOR BACTERIA: CPT

## 2022-11-27 PROCEDURE — 36415 COLL VENOUS BLD VENIPUNCTURE: CPT

## 2022-11-27 RX ORDER — INSULIN LISPRO 100 [IU]/ML
0-4 INJECTION, SOLUTION INTRAVENOUS; SUBCUTANEOUS NIGHTLY
Status: DISCONTINUED | OUTPATIENT
Start: 2022-11-27 | End: 2022-11-29 | Stop reason: HOSPADM

## 2022-11-27 RX ORDER — DEXTROSE MONOHYDRATE 100 MG/ML
INJECTION, SOLUTION INTRAVENOUS CONTINUOUS PRN
Status: DISCONTINUED | OUTPATIENT
Start: 2022-11-27 | End: 2022-11-29 | Stop reason: HOSPADM

## 2022-11-27 RX ORDER — POTASSIUM CHLORIDE 20 MEQ/1
40 TABLET, EXTENDED RELEASE ORAL ONCE
Status: COMPLETED | OUTPATIENT
Start: 2022-11-27 | End: 2022-11-27

## 2022-11-27 RX ORDER — OSELTAMIVIR PHOSPHATE 75 MG/1
75 CAPSULE ORAL 2 TIMES DAILY
Status: DISCONTINUED | OUTPATIENT
Start: 2022-11-27 | End: 2022-11-29 | Stop reason: HOSPADM

## 2022-11-27 RX ORDER — SODIUM CHLORIDE 0.9 % (FLUSH) 0.9 %
5-40 SYRINGE (ML) INJECTION EVERY 12 HOURS SCHEDULED
Status: DISCONTINUED | OUTPATIENT
Start: 2022-11-27 | End: 2022-11-29 | Stop reason: HOSPADM

## 2022-11-27 RX ORDER — SODIUM CHLORIDE 9 MG/ML
INJECTION, SOLUTION INTRAVENOUS CONTINUOUS
Status: ACTIVE | OUTPATIENT
Start: 2022-11-27 | End: 2022-11-28

## 2022-11-27 RX ORDER — ATORVASTATIN CALCIUM 40 MG/1
40 TABLET, FILM COATED ORAL NIGHTLY
Status: DISCONTINUED | OUTPATIENT
Start: 2022-11-27 | End: 2022-11-29 | Stop reason: HOSPADM

## 2022-11-27 RX ORDER — SODIUM CHLORIDE 9 MG/ML
INJECTION, SOLUTION INTRAVENOUS PRN
Status: DISCONTINUED | OUTPATIENT
Start: 2022-11-27 | End: 2022-11-29 | Stop reason: HOSPADM

## 2022-11-27 RX ORDER — SODIUM CHLORIDE 0.9 % (FLUSH) 0.9 %
5-40 SYRINGE (ML) INJECTION PRN
Status: DISCONTINUED | OUTPATIENT
Start: 2022-11-27 | End: 2022-11-29 | Stop reason: HOSPADM

## 2022-11-27 RX ORDER — 0.9 % SODIUM CHLORIDE 0.9 %
30 INTRAVENOUS SOLUTION INTRAVENOUS ONCE
Status: COMPLETED | OUTPATIENT
Start: 2022-11-27 | End: 2022-11-27

## 2022-11-27 RX ORDER — ACETAMINOPHEN 325 MG/1
650 TABLET ORAL EVERY 6 HOURS PRN
Status: DISCONTINUED | OUTPATIENT
Start: 2022-11-27 | End: 2022-11-29 | Stop reason: HOSPADM

## 2022-11-27 RX ORDER — ENOXAPARIN SODIUM 100 MG/ML
30 INJECTION SUBCUTANEOUS 2 TIMES DAILY
Status: DISCONTINUED | OUTPATIENT
Start: 2022-11-27 | End: 2022-11-28 | Stop reason: ALTCHOICE

## 2022-11-27 RX ORDER — VITAMIN B COMPLEX
1000 TABLET ORAL DAILY
Status: DISCONTINUED | OUTPATIENT
Start: 2022-11-27 | End: 2022-11-29 | Stop reason: HOSPADM

## 2022-11-27 RX ORDER — INSULIN LISPRO 100 [IU]/ML
0-4 INJECTION, SOLUTION INTRAVENOUS; SUBCUTANEOUS
Status: DISCONTINUED | OUTPATIENT
Start: 2022-11-27 | End: 2022-11-29 | Stop reason: HOSPADM

## 2022-11-27 RX ORDER — MAGNESIUM SULFATE IN WATER 40 MG/ML
2000 INJECTION, SOLUTION INTRAVENOUS ONCE
Status: COMPLETED | OUTPATIENT
Start: 2022-11-27 | End: 2022-11-27

## 2022-11-27 RX ORDER — ACETAMINOPHEN 650 MG/1
650 SUPPOSITORY RECTAL EVERY 6 HOURS PRN
Status: DISCONTINUED | OUTPATIENT
Start: 2022-11-27 | End: 2022-11-29 | Stop reason: HOSPADM

## 2022-11-27 RX ORDER — LOSARTAN POTASSIUM 100 MG/1
100 TABLET ORAL DAILY
Status: DISCONTINUED | OUTPATIENT
Start: 2022-11-27 | End: 2022-11-29 | Stop reason: HOSPADM

## 2022-11-27 RX ORDER — OSELTAMIVIR PHOSPHATE 75 MG/1
75 CAPSULE ORAL ONCE
Status: COMPLETED | OUTPATIENT
Start: 2022-11-27 | End: 2022-11-27

## 2022-11-27 RX ORDER — INSULIN GLARGINE 100 [IU]/ML
0.15 INJECTION, SOLUTION SUBCUTANEOUS NIGHTLY
Status: DISCONTINUED | OUTPATIENT
Start: 2022-11-27 | End: 2022-11-29 | Stop reason: HOSPADM

## 2022-11-27 RX ADMIN — SODIUM CHLORIDE, PRESERVATIVE FREE 10 ML: 5 INJECTION INTRAVENOUS at 20:31

## 2022-11-27 RX ADMIN — POTASSIUM CHLORIDE 40 MEQ: 20 TABLET, EXTENDED RELEASE ORAL at 01:11

## 2022-11-27 RX ADMIN — INSULIN GLARGINE 16 UNITS: 100 INJECTION, SOLUTION SUBCUTANEOUS at 05:05

## 2022-11-27 RX ADMIN — MAGNESIUM SULFATE IN WATER 2000 MG: 40 INJECTION, SOLUTION INTRAVENOUS at 08:27

## 2022-11-27 RX ADMIN — ACETAMINOPHEN 650 MG: 325 TABLET ORAL at 05:05

## 2022-11-27 RX ADMIN — OSELTAMIVIR PHOSPHATE 75 MG: 75 CAPSULE ORAL at 20:31

## 2022-11-27 RX ADMIN — OSELTAMIVIR PHOSPHATE 75 MG: 75 CAPSULE ORAL at 01:11

## 2022-11-27 RX ADMIN — INSULIN GLARGINE 16 UNITS: 100 INJECTION, SOLUTION SUBCUTANEOUS at 20:43

## 2022-11-27 RX ADMIN — OSELTAMIVIR PHOSPHATE 75 MG: 75 CAPSULE ORAL at 08:05

## 2022-11-27 RX ADMIN — ATORVASTATIN CALCIUM 40 MG: 40 TABLET, FILM COATED ORAL at 20:31

## 2022-11-27 RX ADMIN — MAGNESIUM SULFATE HEPTAHYDRATE 2000 MG: 40 INJECTION, SOLUTION INTRAVENOUS at 00:40

## 2022-11-27 RX ADMIN — LOSARTAN POTASSIUM 100 MG: 100 TABLET, FILM COATED ORAL at 08:04

## 2022-11-27 RX ADMIN — SODIUM CHLORIDE: 9 INJECTION, SOLUTION INTRAVENOUS at 17:32

## 2022-11-27 RX ADMIN — SODIUM CHLORIDE 2100 ML: 9 INJECTION, SOLUTION INTRAVENOUS at 05:03

## 2022-11-27 RX ADMIN — Medication 1000 UNITS: at 08:05

## 2022-11-27 ASSESSMENT — LIFESTYLE VARIABLES
HOW MANY STANDARD DRINKS CONTAINING ALCOHOL DO YOU HAVE ON A TYPICAL DAY: PATIENT DOES NOT DRINK
HOW OFTEN DO YOU HAVE A DRINK CONTAINING ALCOHOL: NEVER

## 2022-11-27 ASSESSMENT — ENCOUNTER SYMPTOMS
DIARRHEA: 1
COUGH: 1
EYE PAIN: 0
VOMITING: 1
COLOR CHANGE: 0
CHEST TIGHTNESS: 0
SHORTNESS OF BREATH: 1
SORE THROAT: 0
NAUSEA: 1
ABDOMINAL PAIN: 0

## 2022-11-27 ASSESSMENT — PAIN SCALES - GENERAL
PAINLEVEL_OUTOF10: 0
PAINLEVEL_OUTOF10: 6
PAINLEVEL_OUTOF10: 4
PAINLEVEL_OUTOF10: 3

## 2022-11-27 ASSESSMENT — PAIN DESCRIPTION - DESCRIPTORS
DESCRIPTORS: ACHING
DESCRIPTORS: ACHING

## 2022-11-27 ASSESSMENT — PAIN - FUNCTIONAL ASSESSMENT: PAIN_FUNCTIONAL_ASSESSMENT: ACTIVITIES ARE NOT PREVENTED

## 2022-11-27 ASSESSMENT — PAIN DESCRIPTION - FREQUENCY: FREQUENCY: INTERMITTENT

## 2022-11-27 ASSESSMENT — PAIN DESCRIPTION - ORIENTATION
ORIENTATION: MID
ORIENTATION: MID

## 2022-11-27 ASSESSMENT — PAIN DESCRIPTION - LOCATION
LOCATION: HEAD
LOCATION: BACK

## 2022-11-27 ASSESSMENT — PAIN DESCRIPTION - PAIN TYPE: TYPE: ACUTE PAIN

## 2022-11-27 NOTE — PROGRESS NOTES
Pt arrived to unit and ambulated from stretcher to bed. Pt is A&Ox4 on RA, very pleasant. Vitals assessment, and 4eyes completed and uploded into flowsheets. Pt with no wounds. Pt does have scattered bruising she says is d/t recent fall. Pt made up to date on plan of care with no questions for RN at this time. Pt's daughter Zelda Johnston at bedside.

## 2022-11-27 NOTE — CONSULTS
Debbie 55  Per   RE chest pain, possible STEMI   returned page @[phone]  Per , no cath lab needed at this time

## 2022-11-27 NOTE — PLAN OF CARE
Problem: Discharge Planning  Goal: Discharge to home or other facility with appropriate resources  Outcome: Progressing  Flowsheets  Taken 11/27/2022 0419  Discharge to home or other facility with appropriate resources: Refer to discharge planning if patient needs post-hospital services based on physician order or complex needs related to functional status, cognitive ability or social support system  Taken 11/27/2022 0402  Discharge to home or other facility with appropriate resources: Refer to discharge planning if patient needs post-hospital services based on physician order or complex needs related to functional status, cognitive ability or social support system     Problem: Pain  Goal: Verbalizes/displays adequate comfort level or baseline comfort level  Outcome: Progressing     Problem: Safety - Adult  Goal: Free from fall injury  Outcome: Progressing     Problem: ABCDS Injury Assessment  Goal: Absence of physical injury  Outcome: Progressing

## 2022-11-27 NOTE — ED PROVIDER NOTES
EMERGENCY DEPARTMENT ENCOUNTER        Pt Name: Suzy Quintanilla  MRN: [id number]  [birth year]reeti 1950  Date of evaluation: 11/26/2022  Provider: Zeenat Pérez MD  PCP: Alli Castillo DO, DO      CHIEF COMPLAINT       Chief Complaint   Patient presents with    Fall    Emesis    Extremity Weakness     States beginning last night c/o cough, emesis and extremity weakness. HISTORY OFPRESENT ILLNESS   (Location/Symptom, Timing/Onset, Context/Setting, Quality, Duration, Modifying Factors,Severity)  Note limiting factors. Suzy Quintanilla is a 74 y.o. female presenting today due to concern for starting to feel ill yesterday associated with cough, fever, nausea, along with generalized weakness. She did have some vomiting today as well. She does have some slight shortness of breath but denies any chest pain. She did have a mild headache today. She does report that trying to get in the bed today she was too weak to get into it and ultimately slid down although states it was a very controlled fall and denies hitting her head or passing out. She did feel lightheaded at the time. She denies any abdominal pain. She is on Coumadin due to history of atrial fibrillation. She denies any unilateral numbness or weakness and feels weak all over. Due to progressively worsening weakness since yesterday, she came to the emergency department with her daughter for further evaluation. Any exertion made her very tired and worsened her symptoms. REVIEW OF SYSTEMS    (2-9 systems for level 4, 10 or more for level 5)     Review of Systems   Constitutional:  Positive for activity change, appetite change, chills, fatigue and fever. Negative for diaphoresis. HENT:  Positive for congestion. Negative for sore throat. Eyes:  Negative for pain. Respiratory:  Positive for cough and shortness of breath. Negative for chest tightness. Cardiovascular:  Negative for chest pain.    Gastrointestinal:  Positive for diarrhea, nausea and vomiting. Negative for abdominal pain. Genitourinary:  Negative for difficulty urinating, dysuria and pelvic pain. Musculoskeletal:  Negative for neck pain and neck stiffness. Skin:  Negative for color change and wound. Neurological:  Positive for weakness (generalized), light-headedness and headaches (mild, no change since the controlled fall). Negative for dizziness, syncope and numbness. Hematological:  Bruises/bleeds easily (on Coumadin). Psychiatric/Behavioral:  Negative for confusion. The patient is not nervous/anxious. Positives and Pertinent negatives as per HPI.       PASTMEDICAL HISTORY     Past Medical History:   Diagnosis Date    Atrial fibrillation (HCC)     Diabetes mellitus ([facility] [address]     Hyperlipidemia     Hypertension     Olecranon bursitis, left elbow 04/13/2017    FADI (obstructive sleep apnea)     CAN'T TOLERATE DEVICE         SURGICAL HISTORY       Past Surgical History:   Procedure Laterality Date    CHOLECYSTECTOMY, LAPAROSCOPIC N/A 9/10/2021    ROBOTIC CHOLECYSTECTOMY performed by Cruz Herrmann MD at [address] Bilateral     cataracts    FINGER TRIGGER RELEASE Right 10/04/2017    long    HYSTERECTOMY (CERVIX STATUS UNKNOWN)      JOINT REPLACEMENT Bilateral     bilateral knee replacements    OTHER SURGICAL HISTORY  11/12/2014    phacoemulsification of cataract with intraocular lens implant right eye    OTHER SURGICAL HISTORY  09/10/2021    ROBOTIC CHOLECYSTECTOMY    OVARY REMOVAL      ROTATOR CUFF REPAIR Bilateral     bilateral rotator cuff repair    TONSILLECTOMY      TOTAL HIP ARTHROPLASTY Left 9/24/2020    LEFT TOTAL HIP REPLACEMENT WITH [facility] performed by Spenser Bradford MD at [place] [address]       Current Discharge Medication List        CONTINUE these medications which have NOT CHANGED    Details   trimethoprim (TRIMPEX) 100 MG tablet Take 100 mg by mouth 2 times daily      vitamin B-12 (CYANOCOBALAMIN) 1000 MCG tablet Take 1,000 mcg by mouth daily      ondansetron (ZOFRAN) 4 MG tablet Take 1 tablet by mouth 3 times daily as needed for Nausea or Vomiting  Qty: 15 tablet, Refills: 0      Probiotic Product (PROBIOTIC-10 PO) Take by mouth      glipiZIDE (GLUCOTROL XL) 5 MG extended release tablet Take 5 mg by mouth daily      Cholecalciferol (VITAMIN D-3) 25 MCG (1000 UT) CAPS Take by mouth daily      hydrochlorothiazide (HYDRODIURIL) 25 MG tablet Take 1 tablet by mouth daily  Qty: 90 tablet, Refills: 3      atorvastatin (LIPITOR) 40 MG tablet Take 40 mg by mouth nightly       warfarin (COUMADIN) 2 MG tablet Take 2 tablets by mouth daily  Qty: 60 tablet, Refills: 0      metFORMIN (GLUCOPHAGE) 500 MG tablet Take 1,000 mg by mouth 2 times daily (with meals)       losartan (COZAAR) 100 MG tablet Take 100 mg by mouth daily. ALLERGIES     Furosemide, Hydrocodone-acetaminophen, Sitagliptin, and Morphine    FAMILY HISTORY       Family History   Problem Relation Age of Onset    Cancer Mother         lung    Cancer Father         lung          SOCIAL HISTORY       Social History     Socioeconomic History    Marital status:       Spouse name: None    Number of children: None    Years of education: None    Highest education level: None   Occupational History    Occupation: Retired   Tobacco Use    Smoking status: Never    Smokeless tobacco: Never   Vaping Use    Vaping Use: Never used   Substance and Sexual Activity    Alcohol use: No     Alcohol/week: 0.0 standard drinks    Drug use: No       SCREENINGS    Og Coma Scale  Eye Opening: Spontaneous  Best Verbal Response: Oriented  Best Motor Response: Obeys commands  Holden Coma Scale Score: 15           PHYSICAL EXAM    (up to 7 for level 4, 8 or more for level 5)     ED Triage Vitals   BP Temp Temp src Pulse Resp SpO2 Height Weight   -- -- -- -- -- -- -- --       Physical Exam  Vitals and nursing note reviewed. Constitutional:       General: She is awake. She is not in acute distress. Appearance: She is well-developed and well-groomed. She is morbidly obese. She is ill-appearing. She is not toxic-appearing or diaphoretic. Interventions: She is not intubated. HENT:      Head: Normocephalic and atraumatic. Right Ear: External ear normal.      Left Ear: External ear normal.      Nose: Nose normal.      Mouth/Throat:      Mouth: Mucous membranes are dry. Eyes:      General: No scleral icterus. Right eye: No discharge. Left eye: No discharge. Conjunctiva/sclera: Conjunctivae normal.      Pupils: Pupils are equal, round, and reactive to light. Neck:      Trachea: Trachea and phonation normal. No tracheal deviation. Cardiovascular:      Rate and Rhythm: Normal rate and regular rhythm. Pulses: Normal pulses. Pulmonary:      Effort: Pulmonary effort is normal. No tachypnea, bradypnea, accessory muscle usage, prolonged expiration, respiratory distress or retractions. She is not intubated. Breath sounds: Normal air entry. No stridor. Rhonchi present. No decreased breath sounds, wheezing or rales. Chest:      Chest wall: No tenderness. Abdominal:      General: Abdomen is flat. Bowel sounds are normal. There is no distension. Palpations: Abdomen is soft. Abdomen is not rigid. Tenderness: There is no abdominal tenderness. There is no guarding or rebound. Negative signs include Lechuga's sign and McBurney's sign. Musculoskeletal:         General: No tenderness, deformity or signs of injury. Normal range of motion. Cervical back: Full passive range of motion without pain, normal range of motion and neck supple. No edema, erythema, signs of trauma, rigidity, torticollis, tenderness or crepitus. No pain with movement, spinous process tenderness or muscular tenderness. Normal range of motion. Skin:     General: Skin is warm and dry.       Coloration: Skin is not jaundiced or pale. Findings: No erythema or rash. Neurological:      General: No focal deficit present. Mental Status: She is alert and oriented to person, place, and time. Mental status is at baseline. GCS: GCS eye subscore is 4. GCS verbal subscore is 5. GCS motor subscore is 6. Cranial Nerves: No dysarthria or facial asymmetry. Sensory: Sensation is intact. No sensory deficit. Motor: Motor function is intact. No weakness (normal leg extension and arm extension bilaterally), tremor, atrophy, abnormal muscle tone or seizure activity. Psychiatric:         Attention and Perception: Attention normal.         Mood and Affect: Affect normal. Mood is depressed. Speech: Speech normal. Speech is not delayed or slurred. Behavior: Behavior normal. Behavior is cooperative.            DIAGNOSTIC RESULTS   :    Labs Reviewed   COVID-19 & INFLUENZA COMBO - Abnormal; Notable for the following components:       Result Value    INFLUENZA A DETECTED (*)     All other components within normal limits   CBC WITH AUTO DIFFERENTIAL - Abnormal; Notable for the following components:    Lymphocytes Absolute 0.5 (*)     All other components within normal limits   COMPREHENSIVE METABOLIC PANEL - Abnormal; Notable for the following components:    Sodium 134 (*)     Potassium 3.2 (*)     Chloride 96 (*)     Glucose 220 (*)     All other components within normal limits    Narrative:     Arnaldo Garcia tel. [phone],  Chemistry results called to and read back by LIZETH Silveira, 11/27/2022  00:20, by [address] ACID - Abnormal; Notable for the following components:    Lactic Acid 3.7 (*)     All other components within normal limits   PROTIME-INR - Abnormal; Notable for the following components:    Protime 27.0 (*)     INR 2.49 (*)     All other components within normal limits   MAGNESIUM - Abnormal; Notable for the following components:    Magnesium 0.80 (*)     All other components within normal limits    Narrative:     Harley Viramontes tel. [phone],  Chemistry results called to and read back by LIZETH Cruz, 11/27/2022  00:20, by [id number], VENOUS - Abnormal; Notable for the following components:    pH, Mushtaq 7.472 (*)     pCO2, Mushtaq 34.1 (*)     pO2, Mushtaq 144.5 (*)     Carboxyhemoglobin 1.6 (*)     All other components within normal limits   URINALYSIS WITH REFLEX TO CULTURE - Abnormal; Notable for the following components:    Glucose, Ur 500 (*)     All other components within normal limits   SEDIMENTATION RATE - Abnormal; Notable for the following components:    Sed Rate 36 (*)     All other components within normal limits   C-REACTIVE PROTEIN - Abnormal; Notable for the following components:    CRP 43.3 (*)     All other components within normal limits    Narrative:     DEVANTE MURRAY tel. [phone],  Chemistry results called to and read back by LIZETH Cruz, 11/27/2022  00:20, by [facility]   LACTIC ACID - Abnormal; Notable for the following components:    Lactic Acid 3.1 (*)     All other components within normal limits   BASIC METABOLIC PANEL W/ REFLEX TO MG FOR LOW K - Abnormal; Notable for the following components:    Sodium 135 (*)     Chloride 97 (*)     Glucose 198 (*)     All other components within normal limits   MAGNESIUM - Abnormal; Notable for the following components:    Magnesium 1.30 (*)     All other components within normal limits   LACTATE, SEPSIS - Abnormal; Notable for the following components:    Lactic Acid, Sepsis 2.2 (*)     All other components within normal limits   BASIC METABOLIC PANEL W/ REFLEX TO MG FOR LOW K - Abnormal; Notable for the following components:    Glucose 161 (*)     Calcium 8.2 (*)     All other components within normal limits   MAGNESIUM - Abnormal; Notable for the following components:    Magnesium 1.60 (*)     All other components within normal limits   POCT GLUCOSE - Abnormal; Notable for the following components:    POC Glucose 187 (*)     All other components within normal limits   POCT GLUCOSE - Abnormal; Notable for the following components:    POC Glucose 158 (*)     All other components within normal limits   POCT GLUCOSE - Abnormal; Notable for the following components:    POC Glucose 186 (*)     All other components within normal limits   POCT GLUCOSE - Abnormal; Notable for the following components:    POC Glucose 148 (*)     All other components within normal limits   CULTURE, BLOOD 1   CULTURE, BLOOD 2   C DIFF TOXIN/ANTIGEN   CULTURE, BLOOD 2   TROPONIN    Narrative:     Hardin Boxer tel. [phone],  Chemistry results called to and read back by LIZETH Clements, 11/27/2022  00:20, by [address] [place] [address]    Narrative:     Evrony Gut [phone],  Chemistry results called to and read back by LIZETH Clements, 11/27/2022  00:20, by [facility]   CK    Narrative:     Nithin Simpsonjanessa tel. [phone],  Chemistry results called to and read back by LIZETH Clements, 11/27/2022  00:20, by [facility]   TSH WITH REFLEX   TROPONIN   LACTIC ACID   HEMOGLOBIN A1C   POCT GLUCOSE   POCT GLUCOSE   POCT GLUCOSE   POCT GLUCOSE       All other labs were within normal range or not returned asof this dictation. EKG: All EKG's are interpreted by the Emergency Department Physician who either signs or Co-signs this chart in the absence of a cardiologist.    The Ekg interpreted by me shows  sinus tachycardia, oqyn=020    Axis is   Normal  QTc is  normal  Intervals and Durations are unremarkable.       ST Segments: nonspecific changes and elevation in  aVf  No significant change from prior EKG dated - 8/20/21  Possible STEMI but poor baseline and plan to repeat in 15 minutes since no current CP or SOB  Repeat EKG still with ST changes with elevations noted, spoke with Dr. Eliot Gilliland and he stated no STEMI but concern for electrolyte abnormalities and and her magnesium did come back critically low which would explain this EKG and initial troponin was negative, therefore he did not recommend heparin at this time        RADIOLOGY:   Non-plain film images such as CT, Ultrasound and MRI are read by the radiologist. Ming Wing images are visualized and preliminarily interpreted by the  ED Provider with the belowfindings:        Interpretation per the Radiologist below, if available at the time of this note:    XR CHEST 1 VIEW   Final Result   1. No active pulmonary disease. 2. Stable cardiomegaly without overt failure. XR ABDOMEN (KUB) (SINGLE AP VIEW)   Final Result   1. Nonspecific and nonobstructive bowel gas pattern. PROCEDURES   Unless otherwise noted below, none     Procedures    CRITICAL CARE TIME   Critical Care Time:    I personally saw the patient and independently provided 40 minutes of non-concurrent critical care out of the total shared critical care time provided. Includes repeat examinations, speaking with consultants, lab interpretation, charting, treating for critically low magnesium level causing EKG changes requiring IV replacement along with IV hydration due to concern for significant dehydration  Excludes separate billable procedures. Patient at risk for serious decompensation if not treated for this life-threatening condition. CONSULTS: Spoke with Dr. Blu Dockery about EKG. Not a STEMI. Paged hospitalist for admission.    IP CONSULT TO HOSPITALIST    EMERGENCY DEPARTMENT COURSE and DIFFERENTIAL DIAGNOSIS/MDM:   Vitals:    Vitals:    11/27/22 0203 11/27/22 0402 11/27/22 0415 11/27/22 0820   BP: 131/63 (!) 146/85  132/61   Pulse: 96 (!) 110  98   Resp: 27 18  20   Temp:  98 °F (36.7 °C)  98.1 °F (36.7 °C)   TempSrc:  Oral  Oral   SpO2: 92% 94%  93%   Weight:   232 lb 4.8 oz (105.4 kg)    Height:   5' 2\" (1.575 m)        Patient was given the following medications:  Medications   atorvastatin (LIPITOR) tablet 40 mg (has no administration in time range)   Vitamin D (CHOLECALCIFEROL) tablet 1,000 Units (1,000 Units Oral Given 11/27/22 0805)   losartan (COZAAR) tablet 100 mg (100 mg Oral Given 11/27/22 0804)   sodium chloride flush 0.9 % injection 5-40 mL (5 mLs IntraVENous Not Given 11/27/22 0820)   sodium chloride flush 0.9 % injection 5-40 mL (has no administration in time range)   0.9 % sodium chloride infusion (has no administration in time range)   enoxaparin Sodium (LOVENOX) injection 30 mg (30 mg SubCUTAneous Not Given 11/27/22 0819)   acetaminophen (TYLENOL) tablet 650 mg (650 mg Oral Given 11/27/22 0505)     Or   acetaminophen (TYLENOL) suppository 650 mg ( Rectal See Alternative 11/27/22 0505)   oseltamivir (TAMIFLU) capsule 75 mg (75 mg Oral Given 11/27/22 0805)   glucose chewable tablet 16 g (has no administration in time range)   dextrose bolus 10% 125 mL (has no administration in time range)     Or   dextrose bolus 10% 250 mL (has no administration in time range)   glucagon (rDNA) injection 1 mg (has no administration in time range)   dextrose 10 % infusion (has no administration in time range)   insulin glargine (LANTUS) injection vial 16 Units (16 Units SubCUTAneous Given 11/27/22 0505)   insulin lispro (HUMALOG) injection vial 0-4 Units (0 Units SubCUTAneous Not Given 11/27/22 1718)   insulin lispro (HUMALOG) injection vial 0-4 Units (0 Units SubCUTAneous Not Given 11/27/22 0452)   0.9 % sodium chloride infusion ( IntraVENous New Bag 11/27/22 1732)   dextromethorphan-guaiFENesin (MUCINEX DM)  MG per extended release tablet 1 tablet (has no administration in time range)   0.9 % sodium chloride bolus (0 mLs IntraVENous Stopped 11/27/22 0039)   ondansetron (ZOFRAN) injection 4 mg (4 mg IntraVENous Given 11/26/22 2334)   magnesium sulfate 2000 mg in 50 mL IVPB premix (0 mg IntraVENous Stopped 11/27/22 0252)   potassium chloride (KLOR-CON M) extended release tablet 40 mEq (40 mEq Oral Given 11/27/22 0111)   oseltamivir (TAMIFLU) capsule 75 mg (75 mg Oral Given 11/27/22 0111) 0.9 % sodium chloride bolus (0 mLs IntraVENous Stopped 11/27/22 0957)   magnesium sulfate 2000 mg in 50 mL IVPB premix (0 mg IntraVENous Stopped 11/27/22 8615)     Patient was evaluated due to concern for not feeling well since yesterday although gradually worsening throughout the day associated with vomiting, diarrhea, cough and overall not feeling well. She did have a controlled fall at home but denies hitting her head and has no new headache or neck pain from the incident. She denies any new pain in the arms or legs or back. Abdominal exam was benign and I do not suspect any intra-abdominal pathology at this time. Influenza did come back positive which would explain her symptoms. She did have significant EKG changes related to low magnesium and potassium levels and therefore I did replace the magnesium with IV replacement and also gave potassium along with IV fluids. Initial troponin was negative and story not suggestive of acute coronary syndrome. She will need further evaluation in the hospital with hydration and electrolyte replacement along with PT/OT evaluation. She denied any pain with breathing and story not suggestive of pulmonary embolism and on top of that she is on Coumadin. She is in no acute distress at time of admission and agreed with this plan. Exclusion criteria - the patient is NOT to be included for SEP-1 Core Measure due to:  Viral etiology found or highly suspected (including COVID-19) without concomitant bacterial infection     I was the primary provider for the patient. The patient tolerated their visit well. The patient and / or the family were informed of the results of any tests, a time was given to answer questions. FINAL IMPRESSION      1. Influenza A    2. Hypomagnesemia    3. Hypokalemia    4. Abnormal EKG    5. Generalized weakness    6.  Dehydration          DISPOSITION/PLAN   DISPOSITION Admitted 11/27/2022 01:35:52 AM      PATIENT REFERRED TO:  No follow-up provider specified.     DISCHARGEMEDICATIONS:  Current Discharge Medication List          DISCONTINUED MEDICATIONS:  Current Discharge Medication List                 (Please note that portions of this note were completed with a voicerecognition program.  Efforts were made to edit the dictations but occasionally words are mis-transcribed.)    Randell Mcpherson MD (electronically signed)            Randell Mcpherson MD  11/27/22 [address] [facility] [address], MD  11/27/22 2028

## 2022-11-27 NOTE — H&P
Hospital Medicine History & Physical      PCP: Mai Galvin DO,     Date of Admission: 11/26/2022    Date of Service: Pt seen/examined on 11/27/2022 and Admitted to Inpatient with expected LOS greater than two midnights due to medical therapy. Chief Complaint:     Fall    Emesis    Extremity Weakness       States beginning last night c/o cough, emesis and extremity weakness. History Of Present Illness:  74 y.o. female with past medical history of diabetes, hypertension, hyperlipidemia, atrial fibrillation on Coumadin who presented to [facility] with generalized weakness, cough, emesis. She was found to be febrile tachycardic No hypoxia. Labs revealed hypokalemia and hypomagnesemia as well as she was Influenza A +. No WBC elevation or anemia. CXR w/o active pulm disease  ECG non acute.      She was admitted for further eval and treatment     On my exam she is lying in bed in NAD, daughter at bedside   Past Medical History:          Diagnosis Date    Atrial fibrillation ([address]     Diabetes mellitus ([address]     Hyperlipidemia     Hypertension     Olecranon bursitis, left elbow 04/13/2017    FADI (obstructive sleep apnea)     CAN'T TOLERATE DEVICE       Past Surgical History:          Procedure Laterality Date    CHOLECYSTECTOMY, LAPAROSCOPIC N/A 9/10/2021    ROBOTIC CHOLECYSTECTOMY performed by Dayanara Le MD at [address] Bilateral     cataracts    [address] Right 10/04/2017    long    HYSTERECTOMY (CERVIX STATUS UNKNOWN)      JOINT REPLACEMENT Bilateral     bilateral knee replacements    OTHER SURGICAL HISTORY  11/12/2014    phacoemulsification of cataract with intraocular lens implant right eye    OTHER SURGICAL HISTORY  09/10/2021    ROBOTIC CHOLECYSTECTOMY    OVARY REMOVAL      ROTATOR CUFF REPAIR Bilateral     bilateral rotator cuff repair    TONSILLECTOMY      TOTAL HIP ARTHROPLASTY Left 9/24/2020    LEFT TOTAL HIP REPLACEMENT WITH [place] Friday [employer] performed by Morgan Runner, MD at [address]         Medications Prior to Admission:      Prior to Admission medications    Medication Sig Start Date End Date Taking? Authorizing Provider   trimethoprim (TRIMPEX) 100 MG tablet Take 100 mg by mouth 2 times daily    Historical Provider, MD   vitamin B-12 (CYANOCOBALAMIN) 1000 MCG tablet Take 1,000 mcg by mouth daily    Historical Provider, MD   ondansetron (ZOFRAN) 4 MG tablet Take 1 tablet by mouth 3 times daily as needed for Nausea or Vomiting 9/10/21   Dawood Garcia MD   Probiotic Product (PROBIOTIC-10 PO) Take by mouth    Historical Provider, MD   glipiZIDE (GLUCOTROL XL) 5 MG extended release tablet Take 5 mg by mouth daily  Patient not taking: Reported on 11/22/2022    Historical Provider, MD   Cholecalciferol (VITAMIN D-3) 25 MCG (1000 UT) CAPS Take by mouth daily    Historical Provider, MD   hydrochlorothiazide (HYDRODIURIL) 25 MG tablet Take 1 tablet by mouth daily 10/2/18   KIMO Lima CNP   atorvastatin (LIPITOR) 40 MG tablet Take 40 mg by mouth nightly  4/20/17   Historical Provider, MD   warfarin (COUMADIN) 2 MG tablet Take 2 tablets by mouth daily 7/13/16   KIMO Lima CNP   metFORMIN (GLUCOPHAGE) 500 MG tablet Take 1,000 mg by mouth 2 times daily (with meals)     Historical Provider, MD   losartan (COZAAR) 100 MG tablet Take 100 mg by mouth daily. Historical Provider, MD       Allergies:  Furosemide, Hydrocodone-acetaminophen, Sitagliptin, and Morphine    Social History:        TOBACCO:   reports that she has never smoked. She has never used smokeless tobacco.  ETOH:   reports no history of alcohol use. E-cigarette/Vaping       Questions Responses    E-cigarette/Vaping Use Never User    Start Date     Passive Exposure     Quit Date     Counseling Given     Comments               Family History:       Reviewed and negative in regards to presenting illness/complaint.         Problem Relation Age of Onset    Cancer Mother         lung    Cancer Father         lung       REVIEW OF SYSTEMS COMPLETED:   Pertinent positives as noted in the HPI. All other systems reviewed and negative. PHYSICAL EXAM PERFORMED:    /61   Pulse 98   Temp 98.1 °F (36.7 °C) (Oral)   Resp 20   Ht 5' 2\" (1.575 m)   Wt 232 lb 4.8 oz (105.4 kg)   SpO2 93%   BMI 42.49 kg/m²     General appearance:  No apparent distress, appears stated age and cooperative. HEENT:  Normal cephalic, atraumatic without obvious deformity. Pupils equal, round, and reactive to light. Extra ocular muscles intact. Conjunctivae/corneas clear. Neck: Supple, with full range of motion. No jugular venous distention. Trachea midline. Respiratory:  Normal respiratory effort. LLL with coarse breath sounds, wet cough   Cardiovascular:  Regular rate and rhythm with normal S1/S2 without murmurs, rubs or gallops. Abdomen: Soft, non-tender, non-distended with normal bowel sounds. Musculoskeletal:  No clubbing, cyanosis or edema bilaterally. Full range of motion without deformity. Skin: Skin color, texture, turgor normal.  No rashes or lesions. Neurologic:  Neurovascularly intact without any focal sensory/motor deficits.  Cranial nerves: II-XII intact, grossly non-focal.  Psychiatric:  Alert and oriented, thought content appropriate, normal insight  Capillary Refill: Brisk,3 seconds, normal  Peripheral Pulses: +2 palpable, equal bilaterally       Labs:     Recent Labs     11/26/22 2330   WBC 7.6   HGB 13.3   HCT 39.7        Recent Labs     11/26/22 2330 11/27/22 0449   * 135*   K 3.2* 3.9   CL 96* 97*   CO2 24 27   BUN 18 15   CREATININE 0.8 0.7   CALCIUM 9.2 9.0     Recent Labs     11/26/22 2330   AST 20   ALT 19   BILITOT 0.9   ALKPHOS 67     Recent Labs     11/26/22 2330   INR 2.49*     Recent Labs     11/26/22 2330 11/27/22 0449   CKTOTAL 40  --    TROPONINI <0.01 <0.01       Urinalysis:      Lab Results   Component Value Date/Time    NITRU Negative 11/26/2022 11:41 PM    WBCUA 0-2 08/20/2021 04:30 PM    BACTERIA 2+ 09/10/2020 07:25 AM    RBCUA 0-2 08/20/2021 04:30 PM    BLOODU Negative 11/26/2022 11:41 PM    SPECGRAV 1.025 11/26/2022 11:41 PM    GLUCOSEU 500 11/26/2022 11:41 PM       Radiology:       EKG:  Sinus tachycardiaLow voltage QRSNonspecific ST abnormalityAbnormal ECGWhen compared with ECG of 26-NOV-2022 23:58,Sinus rhythm has replaced Junctional rhythmNonspecific T wave abnormality no longer evident in Inferior leadsT wave inversion no longer evident in Anterior leadsQT has shortened  XR CHEST 1 VIEW   Final Result   1. No active pulmonary disease. 2. Stable cardiomegaly without overt failure. XR ABDOMEN (KUB) (SINGLE AP VIEW)   Final Result   1. Nonspecific and nonobstructive bowel gas pattern.              Consults:    IP CONSULT TO HOSPITALIST    ASSESSMENT:    Active Hospital Problems    Diagnosis Date Noted    Influenza A [J10.1] 11/27/2022     Priority: Medium    Elevated lactic acid level [R79.89] 11/27/2022     Priority: Medium    Hyperglycemia due to type 2 diabetes mellitus ([facility] [E11.65] 11/27/2022     Priority: Medium    Hypomagnesemia [E83.42] 11/27/2022     Priority: Medium    Paroxysmal atrial fibrillation ([facility] [I48.0] 10/09/2015    Obesity, Class III, BMI 40-49.9 (morbid obesity) ([facility] [E66.01] 08/25/2014    Diabetes ([facility] [E11.9] 08/25/2014    Essential hypertension [I10] 08/25/2014         PLAN:  75 yo female presented with generalized weakness, cough and fatigue found to have electrolyte abnormalities weith influenza A     Influenza A - symptoms started Friday, POA with fever 99 mild tachycardia stable BP   - supportive care, IVF, tamiflu     Sepsis R/O as lactic acidosis likely from dehydration and poor PO intake     Lactic acidosis: POA with 3.1  - suspect from dehydration  - continue with IVF and serial lactic, hold diuretics     Hypomag   - profound 0.8 on arrival recheck 1.3  - replaced, repeat this afternoon   - cont tele    Hypokalemia:   - POA 3.2 replaced and monitor     DM type 2 - well controlled   - last A1C was 6.2   - hold orals while IP, FSBS SSI     HTN: controlled   - cont home losrtan     PAF- currently SR   - cont to monitor   - AX with [facility] to dose INR 2-3             DVT Prophylaxis: Warfarin   Diet: ADULT DIET; Regular; 4 carb choices (60 gm/meal); Low Fat/Low Chol/High Fiber/2 gm Na  Code Status: Full Code    PT/OT Eval Status: ordered     Dispo - 1-2 days        KIMO Jones - CNP    Thank you Mai Galvin DO, DO for the opportunity to be involved in this patient's care. If you have any questions or concerns please feel free to contact me at [phone].

## 2022-11-27 NOTE — PROGRESS NOTES
4 Eyes Skin Assessment     The patient is being assess for  Admission    I agree that 2 RN's have performed a thorough Head to Toe Skin Assessment on the patient. ALL assessment sites listed below have been assessed. Areas assessed by both nurses:   [x]   Head, Face, and Ears   [x]   Shoulders, Back, and Chest  [x]   Arms, Elbows, and Hands   [x]   Coccyx, Sacrum, and Ischum  [x]   Legs, Feet, and Heels        Does the Patient have Skin Breakdown?   No         George Prevention initiated:  No   Wound Care Orders initiated:  No      [facility] nurse consulted for Pressure Injury (Stage 3,4, Unstageable, DTI, NWPT, and Complex wounds):  No      Nurse 1 eSignature: Electronically signed by Enoch Yoon RN on 11/27/22 at 5:47 AM EST    **SHARE this note so that the co-signing nurse is able to place an eSignature**    Nurse 2 eSignature: Electronically signed by Ariela Horowitz on 11/27/22 at 6:43 AM EST

## 2022-11-28 LAB
ANION GAP SERPL CALCULATED.3IONS-SCNC: 6 MMOL/L (ref 3–16)
BUN BLDV-MCNC: 11 MG/DL (ref 7–20)
CALCIUM SERPL-MCNC: 7.9 MG/DL (ref 8.3–10.6)
CHLORIDE BLD-SCNC: 102 MMOL/L (ref 99–110)
CO2: 27 MMOL/L (ref 21–32)
CREAT SERPL-MCNC: 0.6 MG/DL (ref 0.6–1.2)
EKG ATRIAL RATE: 75 BPM
EKG DIAGNOSIS: NORMAL
EKG P AXIS: 86 DEGREES
EKG P-R INTERVAL: 102 MS
EKG Q-T INTERVAL: 364 MS
EKG QRS DURATION: 84 MS
EKG QTC CALCULATION (BAZETT): 406 MS
EKG R AXIS: 58 DEGREES
EKG T AXIS: 53 DEGREES
EKG VENTRICULAR RATE: 75 BPM
ESTIMATED AVERAGE GLUCOSE: 168.6 MG/DL
GFR SERPL CREATININE-BSD FRML MDRD: >60 ML/MIN/{1.73_M2}
GLUCOSE BLD-MCNC: 132 MG/DL (ref 70–99)
GLUCOSE BLD-MCNC: 144 MG/DL (ref 70–99)
GLUCOSE BLD-MCNC: 150 MG/DL (ref 70–99)
GLUCOSE BLD-MCNC: 153 MG/DL (ref 70–99)
GLUCOSE BLD-MCNC: 189 MG/DL (ref 70–99)
HBA1C MFR BLD: 7.5 %
HCT VFR BLD CALC: 35.5 % (ref 36–48)
HEMOGLOBIN: 11.9 G/DL (ref 12–16)
INR BLD: 1.7 (ref 0.87–1.14)
MAGNESIUM: 1.4 MG/DL (ref 1.8–2.4)
MAGNESIUM: 2.5 MG/DL (ref 1.8–2.4)
MCH RBC QN AUTO: 27.8 PG (ref 26–34)
MCHC RBC AUTO-ENTMCNC: 33.4 G/DL (ref 31–36)
MCV RBC AUTO: 83.3 FL (ref 80–100)
PDW BLD-RTO: 14.7 % (ref 12.4–15.4)
PERFORMED ON: ABNORMAL
PLATELET # BLD: 160 K/UL (ref 135–450)
PMV BLD AUTO: 7.3 FL (ref 5–10.5)
POTASSIUM REFLEX MAGNESIUM: 3.4 MMOL/L (ref 3.5–5.1)
POTASSIUM REFLEX MAGNESIUM: 3.9 MMOL/L (ref 3.5–5.1)
PROTHROMBIN TIME: 19.9 SEC (ref 11.7–14.5)
RBC # BLD: 4.27 M/UL (ref 4–5.2)
SODIUM BLD-SCNC: 135 MMOL/L (ref 136–145)
WBC # BLD: 3.6 K/UL (ref 4–11)

## 2022-11-28 PROCEDURE — 93010 ELECTROCARDIOGRAM REPORT: CPT | Performed by: INTERNAL MEDICINE

## 2022-11-28 PROCEDURE — 6360000002 HC RX W HCPCS: Performed by: NURSE PRACTITIONER

## 2022-11-28 PROCEDURE — 6370000000 HC RX 637 (ALT 250 FOR IP): Performed by: NURSE PRACTITIONER

## 2022-11-28 PROCEDURE — 2580000003 HC RX 258: Performed by: NURSE PRACTITIONER

## 2022-11-28 PROCEDURE — 2060000000 HC ICU INTERMEDIATE R&B

## 2022-11-28 PROCEDURE — 97116 GAIT TRAINING THERAPY: CPT

## 2022-11-28 PROCEDURE — 84132 ASSAY OF SERUM POTASSIUM: CPT

## 2022-11-28 PROCEDURE — 83735 ASSAY OF MAGNESIUM: CPT

## 2022-11-28 PROCEDURE — 85027 COMPLETE CBC AUTOMATED: CPT

## 2022-11-28 PROCEDURE — 97165 OT EVAL LOW COMPLEX 30 MIN: CPT

## 2022-11-28 PROCEDURE — 2500000003 HC RX 250 WO HCPCS: Performed by: NURSE PRACTITIONER

## 2022-11-28 PROCEDURE — 80048 BASIC METABOLIC PNL TOTAL CA: CPT

## 2022-11-28 PROCEDURE — 97530 THERAPEUTIC ACTIVITIES: CPT

## 2022-11-28 PROCEDURE — 93005 ELECTROCARDIOGRAM TRACING: CPT | Performed by: INTERNAL MEDICINE

## 2022-11-28 PROCEDURE — 97535 SELF CARE MNGMENT TRAINING: CPT

## 2022-11-28 PROCEDURE — 85610 PROTHROMBIN TIME: CPT

## 2022-11-28 PROCEDURE — 97161 PT EVAL LOW COMPLEX 20 MIN: CPT

## 2022-11-28 PROCEDURE — 36415 COLL VENOUS BLD VENIPUNCTURE: CPT

## 2022-11-28 RX ORDER — NYSTATIN 100000 U/G
CREAM TOPICAL 2 TIMES DAILY
Status: DISCONTINUED | OUTPATIENT
Start: 2022-11-28 | End: 2022-11-28 | Stop reason: SDUPTHER

## 2022-11-28 RX ORDER — NYSTATIN 100000 U/G
OINTMENT TOPICAL 2 TIMES DAILY
Status: DISCONTINUED | OUTPATIENT
Start: 2022-11-28 | End: 2022-11-28

## 2022-11-28 RX ORDER — MAGNESIUM SULFATE IN WATER 40 MG/ML
4000 INJECTION, SOLUTION INTRAVENOUS ONCE
Status: COMPLETED | OUTPATIENT
Start: 2022-11-28 | End: 2022-11-28

## 2022-11-28 RX ORDER — POTASSIUM CHLORIDE 20 MEQ/1
20 TABLET, EXTENDED RELEASE ORAL ONCE
Status: COMPLETED | OUTPATIENT
Start: 2022-11-28 | End: 2022-11-28

## 2022-11-28 RX ADMIN — ATORVASTATIN CALCIUM 40 MG: 40 TABLET, FILM COATED ORAL at 21:41

## 2022-11-28 RX ADMIN — LOSARTAN POTASSIUM 100 MG: 100 TABLET, FILM COATED ORAL at 10:43

## 2022-11-28 RX ADMIN — SODIUM CHLORIDE, PRESERVATIVE FREE 5 ML: 5 INJECTION INTRAVENOUS at 22:30

## 2022-11-28 RX ADMIN — MICONAZOLE NITRATE: 2 POWDER TOPICAL at 17:45

## 2022-11-28 RX ADMIN — NYSTATIN OINTMENT: 100000 OINTMENT TOPICAL at 14:20

## 2022-11-28 RX ADMIN — INSULIN GLARGINE 16 UNITS: 100 INJECTION, SOLUTION SUBCUTANEOUS at 21:40

## 2022-11-28 RX ADMIN — POTASSIUM CHLORIDE 20 MEQ: 1500 TABLET, EXTENDED RELEASE ORAL at 12:21

## 2022-11-28 RX ADMIN — OSELTAMIVIR PHOSPHATE 75 MG: 75 CAPSULE ORAL at 21:40

## 2022-11-28 RX ADMIN — MAGNESIUM SULFATE HEPTAHYDRATE 4000 MG: 40 INJECTION, SOLUTION INTRAVENOUS at 12:16

## 2022-11-28 RX ADMIN — OSELTAMIVIR PHOSPHATE 75 MG: 75 CAPSULE ORAL at 10:42

## 2022-11-28 RX ADMIN — SODIUM CHLORIDE, PRESERVATIVE FREE 10 ML: 5 INJECTION INTRAVENOUS at 11:22

## 2022-11-28 RX ADMIN — WARFARIN SODIUM 6 MG: 5 TABLET ORAL at 18:31

## 2022-11-28 RX ADMIN — Medication 1000 UNITS: at 10:43

## 2022-11-28 ASSESSMENT — PAIN SCALES - GENERAL
PAINLEVEL_OUTOF10: 0

## 2022-11-28 NOTE — PROGRESS NOTES
Occupational Therapy  Facility/Department: [address] [place] [address] PCU  Occupational Therapy Initial Assessment/Treat/Discharge Summary  1x only    Name: Cecy Alonzo  : 1950  MRN: [id number]  Date of Service: 2022    Discharge Recommendations:  Home with assist PRN          Patient Diagnosis(es): The primary encounter diagnosis was Influenza A. Diagnoses of Hypomagnesemia, Hypokalemia, Abnormal EKG, Generalized weakness, and Dehydration were also pertinent to this visit. Past Medical History:  has a past medical history of Atrial fibrillation ([facility] Diabetes mellitus ([facility] Hyperlipidemia, Hypertension, Olecranon bursitis, left elbow, and FADI (obstructive sleep apnea). Past Surgical History:  has a past surgical history that includes Tubal ligation; Hysterectomy; joint replacement (Bilateral); Rotator cuff repair (Bilateral); other surgical history (2014); Finger trigger release (Right, 10/04/2017); eye surgery (Bilateral); Tonsillectomy; Colonoscopy; Total hip arthroplasty (Left, 2020); other surgical history (09/10/2021); Cholecystectomy, laparoscopic (N/A, 9/10/2021); and Ovary removal.           Assessment   Performance deficits / Impairments: Decreased functional mobility ; Decreased balance  Patient admitted to [facility] from home with Flu type A, IPTA, h/o 2 falls this year, pt SBA to supervision for mobility and toilet transfers, [place] for doff socks (uses sock aid at home) no further OT at this time, defer mobility to physical therapy.   Prognosis: Good  Decision Making: Low Complexity  REQUIRES OT FOLLOW-UP: No  Activity Tolerance  Activity Tolerance: Patient Tolerated treatment well        Plan   Occupational Therapy Plan  Times Per Week: 1x only     Restrictions  Restrictions/Precautions  Restrictions/Precautions: General Precautions  Position Activity Restriction  Other position/activity restrictions: up with Assist    Subjective   General  Chart Reviewed: Yes, Orders, Progress Notes, History and Physical  Patient assessed for rehabilitation services?: Yes  Family / Caregiver Present: No  Referring Practitioner: KIMO Klein CNP 11/28  Diagnosis: decreased functional mobility; flu A  Subjective  Subjective: Pt pleasant and agreeable to OT evaluation, states feeling much better today. none reported throughout session  Social/Functional History  Social/Functional History  Lives With: Alone  Type of Home: Condo (ranch)  Home Layout: One level, Performs ADL's on one level, Able to Live on Main level with bedroom/bathroom  Home Access: Level entry (threshhold from garage)  Bathroom Shower/Tub: Walk-in shower, Shower chair with back  Bathroom Toilet: Standard  Bathroom Equipment: Grab bars in shower, Hand-held shower  Home Equipment: Angy Gaspar, 4 wheeled, [employer] beach, Walker, standard, shoe horn, sock-aid  Has the patient had two or more falls in the past year or any fall with injury in the past year?: Yes (one last tuesday, tripped while carrying something, hit forehead, came to [facility] ; broke back 9 mos ago)  ADL Assistance: [address] [place] [address] Avenue: Independent  Ambulation Assistance: Independent (uses 4ww for community distances)  Transfer Assistance: Independent  Active : Yes  Mode of Transportation: Car  Leisure & Hobbies: walk around [place]  Additional Comments: PT treatment after back fx       Objective   Heart Rate: 93  Heart Rate Source: Monitor  BP: 108/71  BP Location: Left upper arm  BP Method: Automatic  Patient Position: Sitting  MAP (Calculated): 83  Resp: 20  SpO2: 93 %  O2 Device: None (Room air)     Pain:-none     Observation/Palpation  Posture: Good  Safety Devices  Type of Devices: All fall risk precautions in place;Call light within reach; Chair alarm in place;Gait belt;Left in chair;Nurse notified    Bed Mobility Training  Bed Mobility Training: No (pt up to chair at start and end of session)  Balance  Sitting: Intact  Standing: Impaired  Standing - Static: Good  Standing - Dynamic: Fair  Transfer Training  Transfer Training: Yes  Sit to Stand: Stand-by assistance  Stand to Sit: Supervision  Toilet Transfer: Supervision    Gait  Overall Level of Assistance: Supervision  Gait Abnormalities:  (baseline lateral sway to L , improves with shoes with lift in L shoe)  Distance (ft): 70 Feet  Assistive Device: Gait belt     AROM: Within functional limits  PROM: Within functional limits  Strength: Within functional limits  Coordination: Within functional limits  Tone: Normal  Sensation: Intact    ADL  Feeding: Independent  Grooming: Supervision  LE Dressing: Stand by assistance (shoes; totalA for socks, pt uses sock aid)  Toileting: Supervision     Activity Tolerance  Activity Tolerance: Patient tolerated evaluation without incident        Vision  Vision: Within Functional Limits  Hearing  Hearing: Within functional limits    Cognition  Overall Cognitive Status: WFL    Orientation  Overall Orientation Status: Within Functional Limits  Orientation Level: Oriented X4         Education Given To: Patient  Education Provided: Role of Therapy;Transfer Training;Plan of Care;ADL Adaptive Strategies; Fall Prevention Strategies  Education Method: Demonstration;Verbal  Barriers to Learning: None  Education Outcome: Verbalized understanding;Demonstrated understanding     Given patient's history of falls, patient educated on fall risks and prevention techniques, including: While in the hospital, CALL before you FALL; bed/chair alarms; wearing non-skid socks/shoes; having all needed items within reach, including nurse call light. Patient verbalized understanding.         AM-PAC Score     AM-PAC Inpatient Daily Activity Raw Score: 21 (11/28/22 1308)  AM-PAC Inpatient ADL T-Scale Score : 44.27 (11/28/22 1308)  ADL Inpatient CMS 0-100% Score: 32.79 (11/28/22 1308)  ADL Inpatient CMS G-Code Modifier : CJ (11/28/22 1308)       Goals  Short Term Goals  Time Frame for Short Term Goals: 1 session  Short Term Goal 1: Pt will complete toilet transfers with supervision-stg met 11/28  Patient Goals   Patient goals : \"to go home today\"       Therapy Time   Individual Concurrent Group Co-treatment   Time In 1025         Time Out 1100         Minutes 35         Timed Code Treatment Minutes: [address], OTR/L

## 2022-11-28 NOTE — CONSULTS
Pharmacy Note  Warfarin Consult  Dx: Afib  Goal INR range 2-3   Home Warfarin dose: 4mg daily per pt. Last dose on 11/26 per pt    Date  INR  Warfarin  11/28              1.70                 6 mg    Recommend Warfarin 6mg tonight x1. Daily INR ordered. Rx will continue to manage therapy per NP's consult order.   Daphne Oglesby/Sunni. 11/28/22 3:38 PM EST

## 2022-11-28 NOTE — PROGRESS NOTES
Hospitalist Progress Note      PCP: No Buckley DO,     Date of Admission: 11/26/2022  Chief Complaint:           Fall    Emesis    Extremity Weakness       States beginning last night c/o cough, emesis and extremity weakness. History Of Present Illness:  74 y.o. female with past medical history of diabetes, hypertension, hyperlipidemia, atrial fibrillation on Coumadin who presented to [facility] with generalized weakness, cough, emesis. She was found to be febrile tachycardic No hypoxia. Labs revealed hypokalemia and hypomagnesemia as well as she was Influenza A +. No WBC elevation or anemia. CXR w/o active pulm disease  ECG non acute.       She was admitted for further eval and treatment       Subjective: No acute events overnight, pt reports she is feeling improved with congestion and overall  fatigue       Medications:  Reviewed    Infusion Medications    sodium chloride      dextrose       Scheduled Medications    magnesium sulfate  4,000 mg IntraVENous Once    nystatin   Topical BID    atorvastatin  40 mg Oral Nightly    Vitamin D  1,000 Units Oral Daily    losartan  100 mg Oral Daily    sodium chloride flush  5-40 mL IntraVENous 2 times per day    enoxaparin  30 mg SubCUTAneous BID    oseltamivir  75 mg Oral BID    insulin glargine  0.15 Units/kg SubCUTAneous Nightly    insulin lispro  0-4 Units SubCUTAneous TID WC    insulin lispro  0-4 Units SubCUTAneous Nightly     PRN Meds: sodium chloride flush, sodium chloride, acetaminophen **OR** acetaminophen, glucose, dextrose bolus **OR** dextrose bolus, glucagon (rDNA), dextrose, dextromethorphan-guaiFENesin      Intake/Output Summary (Last 24 hours) at 11/28/2022 1436  Last data filed at 11/28/2022 1428  Gross per 24 hour   Intake 490 ml   Output --   Net 490 ml       Physical Exam Performed:    /71   Pulse 93   Temp 98.2 °F (36.8 °C) (Oral)   Resp 20   Ht 5' 2\" (1.575 m)   Wt 232 lb 14.4 oz (105.6 kg)   SpO2 93%   PF (!) 20 GLUCOSEU 500 11/26/2022 11:41 PM       Radiology:  XR CHEST 1 VIEW   Final Result   1. No active pulmonary disease. 2. Stable cardiomegaly without overt failure. XR ABDOMEN (KUB) (SINGLE AP VIEW)   Final Result   1. Nonspecific and nonobstructive bowel gas pattern. IP CONSULT TO HOSPITALIST    Assessment/Plan:    Active Hospital Problems    Diagnosis     Influenza A [J10.1]      Priority: Medium    Elevated lactic acid level [R79.89]      Priority: Medium    Hyperglycemia due to type 2 diabetes mellitus ([address] [E11.65]      Priority: Medium    Hypomagnesemia [E83.42]      Priority: Medium    Paroxysmal atrial fibrillation (HCC) [I48.0]     Obesity, Class III, BMI 40-49.9 (morbid obesity) ([address] [E66.01]     Diabetes ([address] [E11.9]     Essential hypertension [I10]      73 yo female presented with generalized weakness, cough and fatigue found to have electrolyte abnormalities weith influenza A      Influenza A - symptoms started Friday, POA with fever 99 mild tachycardia stable BP   - supportive care, IVF, tamiflu      Sepsis R/O as lactic acidosis likely from dehydration and poor PO intake      Lactic acidosis: POA with 3.1  - suspect from dehydration  - continue with IVF and serial lactic, hold diuretics   - 11/28 resolved      Hypomag   - profound 0.8 on arrival recheck 1.3  - replaced, repeat this afternoon   - cont tele  - 11/28 1.4 replaced      Hypokalemia:   - POA 3.2 replaced and monitor      DM type 2 - well controlled   - last A1C was 6.2   - hold orals while IP, FSBS SSI      HTN: controlled   - cont home losrtan      PAF- currently SR   - cont to monitor   - AX with [facility] to dose INR 2-3                 DVT Prophylaxis: warfarin   Diet: ADULT DIET; Regular; 4 carb choices (60 gm/meal);  Low Fat/Low Chol/High Fiber/2 gm Na  Code Status: Full Code  PT/OT Eval Status: ordered     Dispo - suspect tomorrow pending labs     Appropriate for A1 Discharge Unit: No      William Fan APRN - CNP

## 2022-11-28 NOTE — PLAN OF CARE
Pt A&Ox4. Made up to date on plan of care. No questions for RN at this time.        Problem: Discharge Planning  Goal: Discharge to home or other facility with appropriate resources  Outcome: Progressing     Problem: Pain  Goal: Verbalizes/displays adequate comfort level or baseline comfort level  Outcome: Progressing     Problem: Safety - Adult  Goal: Free from fall injury  Outcome: Progressing     Problem: ABCDS Injury Assessment  Goal: Absence of physical injury  Outcome: Progressing

## 2022-11-28 NOTE — PROGRESS NOTES
Physical Therapy  Facility/Department: [facility] C4 PCU  Physical Therapy Initial Assessment    Name: Tessa Otero  : 1950  MRN: [id number]  Date of Service: 2022    Discharge Recommendations:  Home with assist PRN   PT Equipment Recommendations  Equipment Needed: No      Patient Diagnosis(es): The primary encounter diagnosis was Influenza A. Diagnoses of Hypomagnesemia, Hypokalemia, Abnormal EKG, Generalized weakness, and Dehydration were also pertinent to this visit. Past Medical History:  has a past medical history of Atrial fibrillation ([facility] [address] Diabetes mellitus ([facility] [address] Hyperlipidemia, Hypertension, Olecranon bursitis, left elbow, and FADI (obstructive sleep apnea). Past Surgical History:  has a past surgical history that includes Tubal ligation; Hysterectomy; joint replacement (Bilateral); Rotator cuff repair (Bilateral); other surgical history (2014); Finger trigger release (Right, 10/04/2017); eye surgery (Bilateral); Tonsillectomy; Colonoscopy; Total hip arthroplasty (Left, 2020); other surgical history (09/10/2021);  Cholecystectomy, laparoscopic (N/A, 9/10/2021); and Ovary removal.    Assessment   Body Structures, Functions, Activity Limitations Requiring Skilled Therapeutic Intervention: Decreased functional mobility   Assessment: pt is 73 yo female admit to [facility] 2/ FLU A; pt is independent at baseline, pt presents requiring SBA for all mobility, pt is safe to return home from PT standpoint when medically appropriate, pt will benefit from Acute Inpatient Skilled PT to address functional mobility deficits to return to independent function; PT recommends pt return home with intermittent supervision  Treatment Diagnosis: decreased functional mobility deficits  Therapy Prognosis: Excellent  Decision Making: Low Complexity  Barriers to Learning: none  Requires PT Follow-Up: Yes  Activity Tolerance  Activity Tolerance: Patient tolerated evaluation without incident     Plan   Physcial Therapy Plan  General Plan: 3-5 times per week  Current Treatment Recommendations: Strengthening, ROM, Balance training, Functional mobility training, Transfer training, Gait training, Stair training, Neuromuscular re-education, Home exercise program, Safety education & training, Patient/Caregiver education & training, Equipment evaluation, education, & procurement, Modalities, Positioning, Therapeutic activities  Safety Devices  Type of Devices:  All fall risk precautions in place, Call light within reach, Chair alarm in place, Gait belt, Left in chair, Nurse notified     Restrictions  Restrictions/Precautions  Restrictions/Precautions: General Precautions  Position Activity Restriction  Other position/activity restrictions: up with Assist     Subjective   Pain: none reported throughout session  General  Chart Reviewed: Yes  Patient assessed for rehabilitation services?: Yes  Additional Pertinent Hx: h/o L THR  Response To Previous Treatment: Not applicable  Family / Caregiver Present: Yes (dtr Evon present and supportive)  Referring Practitioner: Ana Laura Baird  Referral Date : 11/27/22  Diagnosis: decreased functional mobility  Follows Commands: Within Functional Limits  General Comment  Comments: RN cleared pt for PT eval  Subjective  Subjective: pt motivated to participate         Social/Functional History  Social/Functional History  Lives With: Alone  Type of Home: [employer] ([employer])  Home Layout: One level, Performs ADL's on one level, Able to Live on Main level with bedroom/bathroom  Home Access: Level entry (threshhold from garage)  Bathroom Shower/Tub: Walk-in shower, Shower chair with back  [employer]: Standard  Bathroom Equipment: Grab bars in shower, Hand-held shower  Home Equipment: Yaya Brink, 4 wheeled, [place], Walker, standard  Has the patient had two or more falls in the past year or any fall with injury in the past year?: Yes (one last tuesday, tripped while carrying something, hit forehead, came to [facility] ; broke back 9 mos ago)  ADL Assistance: Independent  Homemaking Assistance: Independent  Ambulation Assistance: Independent (uses 4ww for community distances)  Transfer Assistance: Independent  Active : Yes  Mode of Transportation: Car  Leisure & Hobbies: walk around Car Throttle  Additional Comments: PT treatment after back fx  Vision/Hearing  Vision  Vision: Within Functional Limits  Hearing  Hearing: Within functional limits    Cognition   Orientation  Overall Orientation Status: Within Functional Limits  Orientation Level: Oriented X4  Cognition  Overall Cognitive Status: WFL     Objective   Heart Rate: 93  Heart Rate Source: Monitor  BP: 108/71  BP Location: Left upper arm  BP Method: Automatic  Patient Position: Sitting  MAP (Calculated): 83  Resp: 20  SpO2: 93 %  O2 Device: None (Room air)     Observation/Palpation  Posture: Good  Gross Assessment  AROM: Within functional limits  PROM: Within functional limits  Strength: Within functional limits  Coordination: Within functional limits  Tone: Normal  Sensation: Intact                    Bed mobility  Bed Mobility Comments: pt in chair at start and end of session, pt reports independence with bed mobility, dtr confirms, hob elevated  Transfers  Sit to Stand: Stand by assistance  Stand to Sit: Stand by assistance  Bed to Chair: Stand by assistance  Ambulation  Surface: Level tile  Device: No Device  Assistance: Supervision  Quality of Gait: baseline lateral sway to L , improves with shoes with lift in L shoe  Gait Deviations: Increased LEANNE; Decreased step length  Distance: 70 total in room  More Ambulation?: No  Stairs/Curb  Stairs?: No     Balance  Posture: Good  Sitting - Static: Good  Sitting - Dynamic: Good  Standing - Static: Fair;+  Standing - Dynamic: Fair;+  Exercise Treatment: reviewed standing therex with B UE support on foot of bed, hip abd, ext, flex, heel raises, knee flex, x 5-10 reps, verbalized seated therex with good recall from pt from previous PT sessions AM-PAC Score  AM-PAC Inpatient Mobility Raw Score : 24 (11/28/22 1133)  AM-PAC Inpatient T-Scale Score : 61.14 (11/28/22 1133)  Mobility Inpatient CMS 0-100% Score: 0 (11/28/22 1133)  Mobility Inpatient CMS G-Code Modifier : [facility] (11/28/22 1133)        Goals  Short Term Goals  Time Frame for Short Term Goals: one week 12/5 unless otherwise indicated  Short Term Goal 1: pt will transfer supine to and sit with ind by 12/1  Short Term Goal 2: pt will transfer sit to and from stand with ind  Short Term Goal 3: pt will ambulate 150ft with ind  Short Term Goal 4: pt will demonstrate stepping over treshhold with sup to enter home  Short Term Goal 5: pt will demonstrate and tolerate 10 reps of 3 B LE therex in standing  Patient Goals   Patient Goals : to go home       Education  Patient Education  Education Given To: Patient  Education Provided: Role of Therapy;Plan of Care;Home Exercise Program;Transfer Training;Family Education;Equipment; Fall Prevention Strategies  Education Provided Comments: educated pt regarding resumption of previous PT therex for strengthening to improve functional mobiltiy  Education Method: Demonstration;Verbal  Barriers to Learning: None  Education Outcome: Verbalized understanding;Demonstrated understanding      Therapy Time   Individual Concurrent Group Co-treatment   Time In 1025         Time Out 1056         Minutes 31         Timed Code Treatment Minutes: 16 Minutes     If pt is unable to be seen after this session, please let this note serve as discharge summary. Please see case management note for discharge disposition. Thank you.    Lizz Singh, PT

## 2022-11-28 NOTE — CARE COORDINATION
CASE MANAGEMENT INITIAL ASSESSMENT      Reviewed chart and completed assessment with patient:yes- talked with the Pt via TC. Family present: no  Explained Case Management role/services. Primary contact information: Jesus Alberto Joyner, Dtr [phone]    Health Care Decision Maker :   Primary Decision Maker: Marco A Webb - Maik - [phone]          Can this person be reached and be able to respond quickly, such as within a few minutes or hours? Yes      Admit date/status:11/27/22  Diagnosis: Flu, dehydration   Is this a Readmission?:  No      Insurance:[employer] medicare   Precert required for SNF: Yes       3 night stay required: No    Living arrangements, Adls, care needs, prior to admission:ranch condo alone. IPTA. Durable Medical Equipment at home:  Walker_X-rollator when waling long distances_Cane_X_RTS__ BSC__Shower Chair_X_  02__ HHN__ CPAP__  BiPap__  Hospital Bed__ W/C___ Other_____    Services in the home and/or outpatient, prior to admission:none    Current PCP: Jennifer Jarrell, DO                                Medications:yes Prescription coverage? Yes Will pt require financial assistance with medications No     Transportation needs: drives     PT/OT recs: home with assist prn    Hospital Exemption Notification (HEN):n/a    Barriers to discharge:none    Plan/comments:return home at d/c. Denies needs. Not following.

## 2022-11-28 NOTE — PROGRESS NOTES
Pt alert and orientated. Call light within reach. No complaints of shortness or breath. Marisela Preciado RN

## 2022-11-29 VITALS
TEMPERATURE: 98 F | HEIGHT: 62 IN | RESPIRATION RATE: 16 BRPM | WEIGHT: 230.9 LBS | DIASTOLIC BLOOD PRESSURE: 60 MMHG | BODY MASS INDEX: 42.49 KG/M2 | HEART RATE: 85 BPM | SYSTOLIC BLOOD PRESSURE: 131 MMHG | OXYGEN SATURATION: 93 %

## 2022-11-29 LAB
GLUCOSE BLD-MCNC: 155 MG/DL (ref 70–99)
GLUCOSE BLD-MCNC: 158 MG/DL (ref 70–99)
INR BLD: 1.58 (ref 0.87–1.14)
MAGNESIUM: 2 MG/DL (ref 1.8–2.4)
PERFORMED ON: ABNORMAL
PERFORMED ON: ABNORMAL
PROTHROMBIN TIME: 18.8 SEC (ref 11.7–14.5)

## 2022-11-29 PROCEDURE — 36415 COLL VENOUS BLD VENIPUNCTURE: CPT

## 2022-11-29 PROCEDURE — 2580000003 HC RX 258: Performed by: NURSE PRACTITIONER

## 2022-11-29 PROCEDURE — 83735 ASSAY OF MAGNESIUM: CPT

## 2022-11-29 PROCEDURE — 6370000000 HC RX 637 (ALT 250 FOR IP): Performed by: NURSE PRACTITIONER

## 2022-11-29 PROCEDURE — 85610 PROTHROMBIN TIME: CPT

## 2022-11-29 RX ORDER — OSELTAMIVIR PHOSPHATE 75 MG/1
75 CAPSULE ORAL 2 TIMES DAILY
Qty: 4 CAPSULE | Refills: 0 | Status: SHIPPED | OUTPATIENT
Start: 2022-11-29 | End: 2022-12-01

## 2022-11-29 RX ADMIN — SODIUM CHLORIDE, PRESERVATIVE FREE 10 ML: 5 INJECTION INTRAVENOUS at 08:16

## 2022-11-29 RX ADMIN — MICONAZOLE NITRATE: 2 POWDER TOPICAL at 08:16

## 2022-11-29 RX ADMIN — OSELTAMIVIR PHOSPHATE 75 MG: 75 CAPSULE ORAL at 08:16

## 2022-11-29 RX ADMIN — Medication 1000 UNITS: at 08:16

## 2022-11-29 RX ADMIN — LOSARTAN POTASSIUM 100 MG: 100 TABLET, FILM COATED ORAL at 08:16

## 2022-11-29 NOTE — PLAN OF CARE
Problem: Safety - Adult  Goal: Free from fall injury  11/28/2022 2010 by Fabian Gamble RN  Outcome: Progressing  11/28/2022 1349 by Fabian Gamble RN  Outcome: Progressing   Pt calls out for assistance as needed.  Chair alarm on and working

## 2022-11-29 NOTE — DISCHARGE SUMMARY
Hospital Medicine Discharge Summary    Patient ID: Xiomy Nance      Patient's PCP: [place] Cuadra DO, DO    Admit Date: 11/26/2022     Discharge Date:   11/29/2022     Admitting Provider: Loy Shabazz MD     Discharge Provider: KIMO Traylor - CNP     Discharge Diagnoses: Active Hospital Problems    Diagnosis     Influenza A [J10.1]      Priority: Medium    Elevated lactic acid level [R79.89]      Priority: Medium    Hyperglycemia due to type 2 diabetes mellitus ([facility] [address] [E11.65]      Priority: Medium    Hypomagnesemia [E83.42]      Priority: Medium    Paroxysmal atrial fibrillation (HCC) [I48.0]     Obesity, Class III, BMI 40-49.9 (morbid obesity) ([facility] [address] [E66.01]     Diabetes ([facility] [address] [E11.9]     Essential hypertension [I10]        The patient was seen and examined on day of discharge and this discharge summary is in conjunction with any daily progress note from day of discharge. Hospital Course:    74 y.o. female with past medical history of diabetes, hypertension, hyperlipidemia, atrial fibrillation on Coumadin who presented to [facility] with generalized weakness, cough, emesis. She was found to be febrile tachycardic No hypoxia. Labs revealed hypokalemia and hypomagnesemia as well as she was Influenza A +. No WBC elevation or anemia. CXR w/o active pulm disease  ECG non acute.       She was admitted for further eval and treatment     Influenza A - symptoms started Friday, POA with fever 99 mild tachycardia stable BP   - supportive care, IVF, tamiflu   - on discharge tolerating PO well, cont course of Tamifu      Sepsis R/O as lactic acidosis likely from dehydration and poor PO intake      Lactic acidosis: POA with 3.1  - suspect from dehydration  - continue with IVF and serial lactic, hold diuretics   - 11/28 resolved        Hypomag   - profound 0.8 on arrival recheck 1.3  - replaced, repeat this afternoon   - cont tele  - 11/28 1.4 replaced   -11/29 2.0 on day of DC      Hypokalemia: - POA 3.2 replaced and monitor   - on Day of DC WNL     DM type 2 - well controlled   - last A1C was 6.2   - hold orals while IP, FSBS SSI      HTN: controlled   - cont home losrtan   - held HCTZ can follow up with PCP to see if needs to be resumed, dicussed with pt      PAF- currently SR   - cont to monitor   - AX with [facility] to dose INR 2-3     Skin breakdown: noted red and excoriated skin folds  - nystatin power and interdry drsg for skin folds              Physical Exam Performed:     /60   Pulse 85   Temp 98 °F (36.7 °C) (Oral)   Resp 16   Ht 5' 2\" (1.575 m)   Wt 230 lb 14.4 oz (104.7 kg)   SpO2 93%   PF (!) 20 L/min   BMI 42.23 kg/m²       General appearance:  No apparent distress, appears stated age and cooperative. HEENT:  Normal cephalic, atraumatic without obvious deformity. Pupils equal, round, and reactive to light. Extra ocular muscles intact. Conjunctivae/corneas clear. Neck: Supple, with full range of motion. No jugular venous distention. Trachea midline. Respiratory:  Normal respiratory effort. Clear to auscultation, bilaterally without Rales/Wheezes/Rhonchi. Cardiovascular:  Regular rate and rhythm with normal S1/S2 without murmurs, rubs or gallops. Abdomen: Soft, non-tender, non-distended with normal bowel sounds. Musculoskeletal:  No clubbing, cyanosis or edema bilaterally. Full range of motion without deformity. Skin: Skin color, texture, turgor normal.  No rashes or lesions. Neurologic:  Neurovascularly intact without any focal sensory/motor deficits. Cranial nerves: II-XII intact, grossly non-focal.  Psychiatric:  Alert and oriented, thought content appropriate, normal insight  Capillary Refill: Brisk,< 3 seconds   Peripheral Pulses: +2 palpable, equal bilaterally       Labs:  For convenience and continuity at follow-up the following most recent labs are provided:      CBC:    Lab Results   Component Value Date/Time    WBC 3.6 11/28/2022 05:40 AM    HGB 11.9 11/28/2022 05:40 AM    HCT 35.5 11/28/2022 05:40 AM     11/28/2022 05:40 AM       Renal:    Lab Results   Component Value Date/Time     11/28/2022 05:40 AM    K 3.9 11/28/2022 05:43 PM     11/28/2022 05:40 AM    CO2 27 11/28/2022 05:40 AM    BUN 11 11/28/2022 05:40 AM    CREATININE 0.6 11/28/2022 05:40 AM    CALCIUM 7.9 11/28/2022 05:40 AM    PHOS 2.5 05/20/2016 09:06 AM         Significant Diagnostic Studies    Radiology:   XR CHEST 1 VIEW   Final Result   1. No active pulmonary disease. 2. Stable cardiomegaly without overt failure. XR ABDOMEN (KUB) (SINGLE AP VIEW)   Final Result   1. Nonspecific and nonobstructive bowel gas pattern. Consults:     IP CONSULT TO HOSPITALIST    Disposition:  home      Condition at Discharge: Stable    Discharge Instructions/Follow-up:  PCP     Code Status:  Full Code     Activity: activity as tolerated    Diet: regular diet      Discharge Medications:     Current Discharge Medication List             Details   oseltamivir (TAMIFLU) 75 MG capsule Take 1 capsule by mouth 2 times daily for 4 doses  Qty: 4 capsule, Refills: 0      dextromethorphan-guaiFENesin (MUCINEX DM)  MG per extended release tablet Take 1 tablet by mouth 2 times daily as needed for Cough  Qty: 20 tablet, Refills: 0      miconazole (MICOTIN) 2 % powder Apply topically 2 times daily.   Qty: 45 g, Refills: 1                Details   trimethoprim (TRIMPEX) 100 MG tablet Take 100 mg by mouth 2 times daily      vitamin B-12 (CYANOCOBALAMIN) 1000 MCG tablet Take 1,000 mcg by mouth daily      ondansetron (ZOFRAN) 4 MG tablet Take 1 tablet by mouth 3 times daily as needed for Nausea or Vomiting  Qty: 15 tablet, Refills: 0      Probiotic Product (PROBIOTIC-10 PO) Take by mouth      Cholecalciferol (VITAMIN D-3) 25 MCG (1000 UT) CAPS Take by mouth daily      atorvastatin (LIPITOR) 40 MG tablet Take 40 mg by mouth nightly       warfarin (COUMADIN) 2 MG tablet Take 2 tablets by mouth daily  Qty: 60 tablet, Refills: 0      metFORMIN (GLUCOPHAGE) 500 MG tablet Take 1,000 mg by mouth 2 times daily (with meals)       losartan (COZAAR) 100 MG tablet Take 100 mg by mouth daily. Time Spent on discharge: 32 minutes  in the examination, evaluation, counseling and review of medications and discharge plan. Signed:    Hermes Glaser, KIMO - CNP   11/29/2022      Thank you Cielo Lema DO, DO for the opportunity to be involved in this patient's care. If you have any questions or concerns, please feel free to contact me at [phone].

## 2022-11-29 NOTE — PROGRESS NOTES
Patient and daughter given discharge instructions. All questions answered. Patient brought down to daughter's vehicle via wheelchair by Tram Nguyen. Patient knows to  prescriptions at local pharmacy.

## 2022-11-29 NOTE — PROGRESS NOTES
Pharmacy Note  Warfarin Consult  Dx: Afib  Goal INR range 2-3   Home Warfarin dose: 4mg daily per pt. Last dose on 11/26 per pt    Date  INR  Warfarin  11/28              1.70                   6 mg  11/29              1.58                   6 mg    Recommend Warfarin 6mg tonight x1. Daily INR ordered. Rx will continue to manage therapy per NP's consult order.   Feliberto Torres, PharmD  11/29/2022 at 9:39 AM

## 2022-12-01 LAB
BLOOD CULTURE, ROUTINE: NORMAL
CULTURE, BLOOD 2: NORMAL
CULTURE, BLOOD 2: NORMAL

## 2022-12-27 PROBLEM — J10.1 INFLUENZA A: Status: RESOLVED | Noted: 2022-11-27 | Resolved: 2022-12-27

## 2023-07-26 ENCOUNTER — OFFICE VISIT (OUTPATIENT)
Dept: ENT CLINIC | Age: 73
End: 2023-07-26
Payer: MEDICARE

## 2023-07-26 VITALS
BODY MASS INDEX: 40.85 KG/M2 | HEART RATE: 66 BPM | HEIGHT: 62 IN | OXYGEN SATURATION: 100 % | TEMPERATURE: 97.3 F | SYSTOLIC BLOOD PRESSURE: 133 MMHG | DIASTOLIC BLOOD PRESSURE: 64 MMHG | WEIGHT: 222 LBS

## 2023-07-26 DIAGNOSIS — J30.0 VASOMOTOR RHINITIS: Primary | ICD-10-CM

## 2023-07-26 DIAGNOSIS — J38.7 PRESBYLARYNGES: ICD-10-CM

## 2023-07-26 DIAGNOSIS — R49.0 DYSPHONIA: ICD-10-CM

## 2023-07-26 PROCEDURE — 3075F SYST BP GE 130 - 139MM HG: CPT | Performed by: OTOLARYNGOLOGY

## 2023-07-26 PROCEDURE — 3078F DIAST BP <80 MM HG: CPT | Performed by: OTOLARYNGOLOGY

## 2023-07-26 PROCEDURE — 99204 OFFICE O/P NEW MOD 45 MIN: CPT | Performed by: OTOLARYNGOLOGY

## 2023-07-26 PROCEDURE — 1123F ACP DISCUSS/DSCN MKR DOCD: CPT | Performed by: OTOLARYNGOLOGY

## 2023-07-26 PROCEDURE — 31575 DIAGNOSTIC LARYNGOSCOPY: CPT | Performed by: OTOLARYNGOLOGY

## 2023-07-26 RX ORDER — IPRATROPIUM BROMIDE 21 UG/1
2 SPRAY, METERED NASAL 4 TIMES DAILY
Qty: 1 EACH | Refills: 1 | Status: SHIPPED | OUTPATIENT
Start: 2023-07-26

## 2023-07-26 RX ORDER — LOSARTAN POTASSIUM 50 MG/1
TABLET ORAL
COMMUNITY
Start: 2023-06-27

## 2023-07-26 RX ORDER — POTASSIUM CHLORIDE 20 MEQ/1
20 TABLET, EXTENDED RELEASE ORAL
COMMUNITY
Start: 2023-06-02

## 2023-07-26 RX ORDER — AMIODARONE HYDROCHLORIDE 200 MG/1
TABLET ORAL
COMMUNITY
Start: 2023-07-12

## 2023-07-26 RX ORDER — HYDROCHLOROTHIAZIDE 25 MG/1
25 TABLET ORAL EVERY MORNING
COMMUNITY
Start: 2023-06-27

## 2023-07-26 RX ORDER — TIRZEPATIDE 2.5 MG/.5ML
INJECTION, SOLUTION SUBCUTANEOUS
COMMUNITY
Start: 2023-07-18

## 2023-07-26 ASSESSMENT — ENCOUNTER SYMPTOMS
SORE THROAT: 0
SINUS PRESSURE: 0
APNEA: 0
FACIAL SWELLING: 0
EYE ITCHING: 0
TROUBLE SWALLOWING: 0
VOICE CHANGE: 1
RHINORRHEA: 1
COUGH: 0
SHORTNESS OF BREATH: 0

## 2023-07-26 NOTE — PROGRESS NOTES
[facility], [address] [place] [address], [address] [place] [address],[address]  P: [phone]       Patient     Anjum Lubin  1950    ChiefComplaint     Chief Complaint   Patient presents with    New Patient     Patient is here today for hoarseness. Patient states she has had hoarseness off and on for years now. Patient states her nose constantly runs, but when she blows it nothing comes out. Patient states her throat is not sore. Patient states she gets choked very rarely on food. History of Present Illness     Juan Pablo Linares is a 73-year-old female here today for evaluation of intermittent hoarseness that has been present for years as well as watery nasal drainage. Hoarseness present intermittently throughout the day worse in times of stress and fatigue. Denies dysphagia, odynophagia or history of acid reflux. Reports clear watery drainage from her nose particularly at mealtimes.     Past Medical History     Past Medical History:   Diagnosis Date    Atrial fibrillation ([address])     Diabetes mellitus ([address])     Hyperlipidemia     Hypertension     Olecranon bursitis, left elbow 04/13/2017    FADI (obstructive sleep apnea)     CAN'T TOLERATE DEVICE       Past Surgical History     Past Surgical History:   Procedure Laterality Date    CHOLECYSTECTOMY, LAPAROSCOPIC N/A 9/10/2021    ROBOTIC CHOLECYSTECTOMY performed by Serena Brown [facility] at [facility] Bilateral     cataracts    FINGER TRIGGER RELEASE Right 10/04/2017    long    HYSTERECTOMY (CERVIX STATUS UNKNOWN)      JOINT REPLACEMENT Bilateral     bilateral knee replacements    OTHER SURGICAL HISTORY  11/12/2014    phacoemulsification of cataract with intraocular lens implant right eye    OTHER SURGICAL HISTORY  09/10/2021    ROBOTIC CHOLECYSTECTOMY    OVARY REMOVAL      ROTATOR CUFF REPAIR Bilateral     bilateral rotator cuff repair    TONSILLECTOMY      TOTAL HIP ARTHROPLASTY Left 9/24/2020    LEFT TOTAL HIP REPLACEMENT WITH

## 2023-07-27 ENCOUNTER — HOSPITAL ENCOUNTER (OUTPATIENT)
Dept: MAMMOGRAPHY | Age: 73
Discharge: HOME | End: 2023-07-27
Payer: MEDICARE

## 2023-07-27 DIAGNOSIS — Z12.31 SCREENING MAMMOGRAM, ENCOUNTER FOR: ICD-10-CM

## 2023-07-27 PROCEDURE — 77063 BREAST TOMOSYNTHESIS BI: CPT

## 2024-01-16 ENCOUNTER — PROCEDURE VISIT (OUTPATIENT)
Dept: AUDIOLOGY | Age: 74
End: 2024-01-16
Payer: MEDICARE

## 2024-01-16 ENCOUNTER — OFFICE VISIT (OUTPATIENT)
Dept: ENT CLINIC | Age: 74
End: 2024-01-16
Payer: MEDICARE

## 2024-01-16 VITALS
BODY MASS INDEX: 40.85 KG/M2 | DIASTOLIC BLOOD PRESSURE: 73 MMHG | RESPIRATION RATE: 16 BRPM | HEIGHT: 62 IN | HEART RATE: 98 BPM | SYSTOLIC BLOOD PRESSURE: 136 MMHG | WEIGHT: 222 LBS

## 2024-01-16 DIAGNOSIS — R42 DIZZINESS AND GIDDINESS: ICD-10-CM

## 2024-01-16 DIAGNOSIS — H90.3 SENSORINEURAL HEARING LOSS, BILATERAL: Primary | ICD-10-CM

## 2024-01-16 DIAGNOSIS — H91.22 SUDDEN LEFT HEARING LOSS: Primary | ICD-10-CM

## 2024-01-16 DIAGNOSIS — H93.12 TINNITUS OF LEFT EAR: ICD-10-CM

## 2024-01-16 DIAGNOSIS — H90.3 ASYMMETRIC SNHL (SENSORINEURAL HEARING LOSS): ICD-10-CM

## 2024-01-16 DIAGNOSIS — H93.8X2 EAR PRESSURE, LEFT: ICD-10-CM

## 2024-01-16 PROCEDURE — 3075F SYST BP GE 130 - 139MM HG: CPT | Performed by: OTOLARYNGOLOGY

## 2024-01-16 PROCEDURE — 92557 COMPREHENSIVE HEARING TEST: CPT | Performed by: AUDIOLOGIST

## 2024-01-16 PROCEDURE — 1123F ACP DISCUSS/DSCN MKR DOCD: CPT | Performed by: OTOLARYNGOLOGY

## 2024-01-16 PROCEDURE — 92567 TYMPANOMETRY: CPT | Performed by: AUDIOLOGIST

## 2024-01-16 PROCEDURE — 3078F DIAST BP <80 MM HG: CPT | Performed by: OTOLARYNGOLOGY

## 2024-01-16 PROCEDURE — 99214 OFFICE O/P EST MOD 30 MIN: CPT | Performed by: OTOLARYNGOLOGY

## 2024-01-16 RX ORDER — PREDNISONE 10 MG/1
TABLET ORAL
Qty: 45 TABLET | Refills: 0 | Status: SHIPPED | OUTPATIENT
Start: 2024-01-16

## 2024-01-16 RX ORDER — MAGNESIUM 30 MG
30 TABLET ORAL 2 TIMES DAILY
COMMUNITY

## 2024-01-16 ASSESSMENT — ENCOUNTER SYMPTOMS
FACIAL SWELLING: 0
APNEA: 0
COUGH: 0
TROUBLE SWALLOWING: 0
SORE THROAT: 0
EYE ITCHING: 0
SINUS PRESSURE: 0
VOICE CHANGE: 0
SHORTNESS OF BREATH: 0

## 2024-01-16 NOTE — PROGRESS NOTES
Omaira Baum   1950, 73 y.o. female   [id number]       Referring Provider: Katie Vasquez DO  Referral Type: In an order in Epic    Reason for Visit: Evaluation of the cause of disorders of hearing, tinnitus, or balance.    ADULT AUDIOLOGIC EVALUATION      Omaira Baum is a 73 y.o. female seen today, 1/16/2024 , for an initial audiologic evaluation.  Patient was seen by Katie Vasquez DO following today's evaluation. Patient was alone.    AUDIOLOGIC AND OTHER PERTINENT MEDICAL HISTORY:      Omaira Baum noted aural fullness, tinnitus, and imbalance.  Patient reports a sudden decline in hearing, bilaterally, occurring on Saturday (four days ago).  She noted the hearing improved on Sunday but had left ear pressure and intermittent tinnitus.  Patient has had hearing loss in the right ear since childhood related to mumps or head injury, per patient.  Patient experiences an imbalance sensation related to a hip replacement.  She denied changes in balance during the sudden decline in hearing.      Omaira Baum denied otalgia, history of occupational/recreational noise exposure, history of head trauma, history of ear surgery, and family history of hearing loss.    Date: 1/16/2024     IMPRESSIONS:      Normal middle ear pressure and compliance, bilaterally.  Abnormal asymmetric hearing loss, right worse than left, which can affect every day listening needs.  Word understanding was very poor in the right ear and excellent in the left ear presented at elevated sensation levels.  Follow medical recommendations of Katie Vasquez DO.     ASSESSMENT AND FINDINGS:     Otoscopy revealed: Clear ear canals bilaterally    RIGHT EAR:  Hearing Sensitivity: Moderately severe to a profound sensorineural hearing loss from 250-8000 Hz  Speech Recognition Threshold: 65 dB HL with 35 dBHL of masking  Word Recognition:Very Poor (10%), based on NU-6 25-word list at 90 dBHL with 60 dBHL of masking using recorded speech stimuli.

## 2024-01-16 NOTE — PROGRESS NOTES
[facility]  [address], [address]  [place] [address]  P: [phone]       Patient     Omaira Baum  1950    ChiefComplaint     Chief Complaint   Patient presents with    Follow-up     Patient is being seen for sudden hearing loss, she has gotten a lot of her hearing back but not all of it, she states that when she burps or coughs she feels a zap in her ear.        History of Present Illness     Omaira is a 73 year old female here today for evaluation of sudden hearing loss in left ear. History of severe right sided hearing loss since childhood 2/2 Mumps. On 1/12/2024 sudden hearing loss in left ear. 1/13/2024 improvement in hearing with new onset zapping noise but hearing has not returned to normal. Denies imbalance.    Past Medical History     Past Medical History:   Diagnosis Date    Atrial fibrillation (HCC)     Diabetes mellitus (HCC)     Hyperlipidemia     Hypertension     Olecranon bursitis, left elbow 04/13/2017    FADI (obstructive sleep apnea)     CAN'T TOLERATE DEVICE       Past Surgical History     Past Surgical History:   Procedure Laterality Date    CHOLECYSTECTOMY, LAPAROSCOPIC N/A 9/10/2021    ROBOTIC [place]CYST[place] [facility] [place]    COLONOSCOPY      EYE SURGERY Bilateral     cataracts    FINGER TRIGGER RELEASE Right 10/04/2017    long    HYSTERECTOMY (CERVIX STATUS UNKNOWN)      JOINT REPLACEMENT Bilateral     bilateral knee replacements    OTHER SURGICAL HISTORY  11/12/2014    phacoemulsification of cataract with intraocular lens implant right eye    OTHER SURGICAL HISTORY  09/10/2021    ROBOTIC CHOLECYSTECTOMY    OVARY REMOVAL      ROTATOR CUFF REPAIR Bilateral     bilateral rotator cuff repair    TONSILLECTOMY      TOTAL HIP ARTHROPLASTY Left 9/24/2020    LEFT TOTAL HIP REPLACEMENT WITH CELLSAVER      TORRES & NEPHEW performed by Seferino Cerda MD at [facility] OR    TUBAL LIGATION         Family History     Family History   Problem

## 2024-01-23 ENCOUNTER — PROCEDURE VISIT (OUTPATIENT)
Dept: AUDIOLOGY | Age: 74
End: 2024-01-23
Payer: MEDICARE

## 2024-01-23 ENCOUNTER — OFFICE VISIT (OUTPATIENT)
Dept: ENT CLINIC | Age: 74
End: 2024-01-23
Payer: MEDICARE

## 2024-01-23 VITALS — DIASTOLIC BLOOD PRESSURE: 87 MMHG | HEART RATE: 70 BPM | SYSTOLIC BLOOD PRESSURE: 138 MMHG

## 2024-01-23 DIAGNOSIS — H91.22 SUDDEN LEFT HEARING LOSS: Primary | ICD-10-CM

## 2024-01-23 DIAGNOSIS — H93.8X1 EAR PRESSURE, RIGHT: ICD-10-CM

## 2024-01-23 DIAGNOSIS — H92.02 OTALGIA OF LEFT EAR: ICD-10-CM

## 2024-01-23 DIAGNOSIS — H93.12 TINNITUS OF LEFT EAR: ICD-10-CM

## 2024-01-23 DIAGNOSIS — H90.3 SENSORINEURAL HEARING LOSS, BILATERAL: Primary | ICD-10-CM

## 2024-01-23 DIAGNOSIS — H90.3 ASYMMETRIC SNHL (SENSORINEURAL HEARING LOSS): ICD-10-CM

## 2024-01-23 PROCEDURE — 1123F ACP DISCUSS/DSCN MKR DOCD: CPT | Performed by: OTOLARYNGOLOGY

## 2024-01-23 PROCEDURE — 99214 OFFICE O/P EST MOD 30 MIN: CPT | Performed by: OTOLARYNGOLOGY

## 2024-01-23 PROCEDURE — 3075F SYST BP GE 130 - 139MM HG: CPT | Performed by: OTOLARYNGOLOGY

## 2024-01-23 PROCEDURE — 3079F DIAST BP 80-89 MM HG: CPT | Performed by: OTOLARYNGOLOGY

## 2024-01-23 PROCEDURE — 92567 TYMPANOMETRY: CPT | Performed by: AUDIOLOGIST

## 2024-01-23 PROCEDURE — 92557 COMPREHENSIVE HEARING TEST: CPT | Performed by: AUDIOLOGIST

## 2024-01-23 NOTE — PROGRESS NOTES
(100%), based on NU-6 25-word list at 60 dBHL using recorded speech stimuli.    Tympanometry: Normal peak pressure and compliance, Type A tympanogram, consistent with normal middle ear function.    NOTE: An asymmetry of  >10 dBHL 250-8000 Hz is present with right ear worse than left ear.      Reliability: Good   Transducer: Inserts      See scanned audiogram dated 1/23/2024  for results.        PATIENT EDUCATION:       The following items were discussed with the patient:   - Good Communication Strategies  - Hearing Loss and Hearing Aids    Educational information was shared in the After Visit Summary.                                                RECOMMENDATIONS:                                                                                                                                                                                                                                                            The following items are recommended based on patient report and results from today's appointment:   - Continue medical follow-up with Katie Vasquez DO.   - Retest hearing as medically indicated and/or sooner if a change in hearing is noted.  - If desired, schedule a Hearing Aid Evaluation (HAE) appointment to discuss hearing aid options.  - Utilize \"Good Communication Strategies\" as discussed to assist in speech understanding with communication partners.       Flaco Tellez  Audiologist    Chart CC'd to: Katie Vasquez DO      Degree of   Hearing Sensitivity dB Range   Within Normal Limits (WNL) 0 - 20   Mild 20 - 40   Moderate 40 - 55   Moderately-Severe 55 - 70   Severe 70 - 90   Profound 90 +

## 2024-01-25 ASSESSMENT — ENCOUNTER SYMPTOMS
TROUBLE SWALLOWING: 0
COUGH: 0
FACIAL SWELLING: 0
SHORTNESS OF BREATH: 0
APNEA: 0
SINUS PRESSURE: 0
EYE ITCHING: 0

## 2024-01-25 NOTE — PROGRESS NOTES
[facility]  [address], [address]  [place] [address]  P: [phone]       Patient     Omaira Baum  1950    ChiefComplaint     Chief Complaint   Patient presents with    Follow-up     Patient feels like hearing is getting better.        History of Present Illness     Omaira is a 73-year-old female here today for follow-up regarding sensorineural hearing loss in left ear.  States she tolerates steroids with no side effects.  Does believe the hearing in her left ear has improved some.    Past Medical History     Past Medical History:   Diagnosis Date    Atrial fibrillation (HCC)     Diabetes mellitus (HCC)     Hyperlipidemia     Hypertension     Olecranon bursitis, left elbow 04/13/2017    FADI (obstructive sleep apnea)     CAN'T TOLERATE DEVICE       Past Surgical History     Past Surgical History:   Procedure Laterality Date    CHOLECYSTECTOMY, LAPAROSCOPIC N/A 9/10/2021    ROBOTIC CHOLECYSTECTOMY performed by Joshua Hanson MD at [facility] OR    COLONOSCOPY      EYE SURGERY Bilateral     cataracts    FINGER TRIGGER RELEASE Right 10/04/2017    long    HYSTERECTOMY (CERVIX STATUS UNKNOWN)      JOINT REPLACEMENT Bilateral     bilateral knee replacements    OTHER SURGICAL HISTORY  11/12/2014    phacoemulsification of cataract with intraocular lens implant right eye    OTHER SURGICAL HISTORY  09/10/2021    ROBOTIC CHOLECYSTECTOMY    OVARY REMOVAL      ROTATOR CUFF REPAIR Bilateral     bilateral rotator cuff repair    TONSILLECTOMY      TOTAL HIP ARTHROPLASTY Left 9/24/2020    LEFT TOTAL HIP REPLACEMENT WITH CELLSAVER      TORRES & NEPHEW performed by Seferino Cerda MD at [facility] OR    TUBAL LIGATION         Family History     Family History   Problem Relation Age of Onset    Cancer Mother         lung    Cancer Father         lung       Social History     Social History     Tobacco Use    Smoking status: Never    Smokeless tobacco: Never   Vaping Use    Vaping Use: Never used   Substance Use

## 2024-01-30 ENCOUNTER — HOSPITAL ENCOUNTER (OUTPATIENT)
Dept: MRI IMAGING | Age: 74
Discharge: HOME | End: 2024-01-30
Attending: OTOLARYNGOLOGY
Payer: MEDICARE

## 2024-01-30 ENCOUNTER — TELEPHONE (OUTPATIENT)
Dept: ENT CLINIC | Age: 74
End: 2024-01-30

## 2024-01-30 DIAGNOSIS — H90.3 ASYMMETRIC SNHL (SENSORINEURAL HEARING LOSS): ICD-10-CM

## 2024-01-30 DIAGNOSIS — H91.22 SUDDEN LEFT HEARING LOSS: Primary | ICD-10-CM

## 2024-01-30 DIAGNOSIS — H91.22 SUDDEN LEFT HEARING LOSS: ICD-10-CM

## 2024-01-30 LAB
PERFORMED ON: NORMAL
POC CREATININE: 0.8 MG/DL (ref 0.6–1.2)
POC SAMPLE TYPE: NORMAL

## 2024-01-30 PROCEDURE — 82565 ASSAY OF CREATININE: CPT

## 2024-01-30 PROCEDURE — A9579 GAD-BASE MR CONTRAST NOS,1ML: HCPCS | Performed by: OTOLARYNGOLOGY

## 2024-01-30 PROCEDURE — 6360000004 HC RX CONTRAST MEDICATION: Performed by: OTOLARYNGOLOGY

## 2024-01-30 PROCEDURE — 70553 MRI BRAIN STEM W/O & W/DYE: CPT

## 2024-01-30 RX ADMIN — GADOTERIDOL 20 ML: 279.3 INJECTION, SOLUTION INTRAVENOUS at 10:41

## 2024-01-31 LAB
CREAT SERPL-MCNC: 0.7 MG/DL (ref 0.6–1.2)
GFR SERPLBLD CREATININE-BSD FMLA CKD-EPI: >60 ML/MIN/{1.73_M2}

## 2024-07-31 ENCOUNTER — HOSPITAL ENCOUNTER (OUTPATIENT)
Dept: MAMMOGRAPHY | Age: 74
Discharge: HOME | End: 2024-07-31
Payer: MEDICARE

## 2024-07-31 VITALS — HEIGHT: 62 IN | WEIGHT: 222 LBS | BODY MASS INDEX: 40.85 KG/M2

## 2024-07-31 DIAGNOSIS — Z12.31 SCREENING MAMMOGRAM FOR BREAST CANCER: ICD-10-CM

## 2024-07-31 PROCEDURE — 77063 BREAST TOMOSYNTHESIS BI: CPT

## 2025-08-01 ENCOUNTER — HOSPITAL ENCOUNTER (OUTPATIENT)
Dept: MAMMOGRAPHY | Age: 75
Discharge: HOME | End: 2025-08-01
Payer: MEDICARE

## 2025-08-01 VITALS — BODY MASS INDEX: 39.46 KG/M2 | WEIGHT: 209 LBS | HEIGHT: 61 IN

## 2025-08-01 DIAGNOSIS — Z12.31 SCREENING MAMMOGRAM, ENCOUNTER FOR: ICD-10-CM

## 2025-08-01 PROCEDURE — 77063 BREAST TOMOSYNTHESIS BI: CPT

## (undated) DEVICE — REFLECTION FLEXIBLE DRILL 25MM: Brand: REFLECTION

## (undated) DEVICE — Device

## (undated) DEVICE — NEEDLE HYPO 20GA L1.5IN YEL POLYPR HUB S STL REG BVL STR

## (undated) DEVICE — OPTIFOAM GENTLE SA, POSTOP, 4X12: Brand: MEDLINE

## (undated) DEVICE — SYSTEM SKIN CLSR 22CM DERMBND PRINEO

## (undated) DEVICE — SUTURE VCRL SZ 4-0 L18IN ABSRB UD L19MM PS-2 3/8 CIR PRIM J496H

## (undated) DEVICE — GLOVE,SURG,SENSICARE SLT,LF,PF,7.5: Brand: MEDLINE

## (undated) DEVICE — PILLOW POS W15XH6XL22IN RASPBERRY FOAM ABD W/ STRP DISP FOR

## (undated) DEVICE — BLADELESS OBTURATOR: Brand: WECK VISTA

## (undated) DEVICE — SUTURE ETHBND EXCEL SZ 2 L30IN NONABSORBABLE GRN L40MM V-37 MX69G

## (undated) DEVICE — SMARTGOWN SURGICAL GOWN, XL: Brand: CONVERTORS

## (undated) DEVICE — PUMP SUC IRR TBNG L10FT W/ HNDPC ASSEMB STRYKEFLOW 2

## (undated) DEVICE — CLIP INT L POLYMER LOK LIG HEM O LOK

## (undated) DEVICE — GLOVE SURG SZ 65 THK91MIL LTX FREE SYN POLYISOPRENE

## (undated) DEVICE — CLIP INT M L POLYMER LOK LIG HEM O LOK

## (undated) DEVICE — STERILE POLYISOPRENE POWDER-FREE SURGICAL GLOVES: Brand: PROTEXIS

## (undated) DEVICE — TROCAR: Brand: KII FIOS FIRST ENTRY

## (undated) DEVICE — 35 ML SYRINGE LUER-LOCK TIP: Brand: MONOJECT

## (undated) DEVICE — SUTURE VCRL + SZ 2-0 L18IN ABSRB UD CT1 L36MM 1/2 CIR VCP839D

## (undated) DEVICE — SUTURE ETHBND EXCEL SZ 0 L18IN NONABSORBABLE GRN L22MM MO-7 CX41D

## (undated) DEVICE — PENCIL SMK EVAC ALL IN 1 DSGN ENH VISIBILITY IMPROVED AIR

## (undated) DEVICE — SMARTGOWN BREATHABLE SURGICAL GOWN: Brand: CONVERTORS

## (undated) DEVICE — REDUCER: Brand: ENDOWRIST

## (undated) DEVICE — SEAL

## (undated) DEVICE — CANNULA SEAL

## (undated) DEVICE — ARM DRAPE

## (undated) DEVICE — HOOD, PEEL-AWAY: Brand: FLYTE

## (undated) DEVICE — COVER,TABLE,HEAVY DUTY,50"X90",STRL: Brand: MEDLINE

## (undated) DEVICE — SOLUTION IRRIG 2000ML 0.9% SOD CHL USP UROMATIC PLAS CONT

## (undated) DEVICE — SUTURE VCRL + SZ 0 L18IN ABSRB UD L36MM CT-1 1/2 CIR VCP840D

## (undated) DEVICE — SUTURE SURGLON SZ 1 L18IN NONABSORBABLE BLK GS 21 L37MM 1 2 8886196272

## (undated) DEVICE — SOLUTION IV IRRIG POUR BRL 0.9% SODIUM CHL 2F7124

## (undated) DEVICE — [HIGH FLOW HEATED INSUFFLATOR TUBING,  DO NOT USE IF PACKAGE IS DAMAGED]

## (undated) DEVICE — SOLUTION IV IRRIG WATER 1000ML POUR BRL 2F7114